# Patient Record
Sex: FEMALE | Race: WHITE | NOT HISPANIC OR LATINO | URBAN - METROPOLITAN AREA
[De-identification: names, ages, dates, MRNs, and addresses within clinical notes are randomized per-mention and may not be internally consistent; named-entity substitution may affect disease eponyms.]

---

## 2017-11-28 ENCOUNTER — OUTPATIENT (OUTPATIENT)
Dept: OUTPATIENT SERVICES | Facility: HOSPITAL | Age: 82
LOS: 1 days | Discharge: HOME | End: 2017-11-28

## 2017-11-28 DIAGNOSIS — R20.0 ANESTHESIA OF SKIN: ICD-10-CM

## 2017-11-30 DIAGNOSIS — H26.9 UNSPECIFIED CATARACT: ICD-10-CM

## 2017-11-30 DIAGNOSIS — E11.9 TYPE 2 DIABETES MELLITUS WITHOUT COMPLICATIONS: ICD-10-CM

## 2019-09-19 ENCOUNTER — INPATIENT (INPATIENT)
Facility: HOSPITAL | Age: 84
LOS: 4 days | Discharge: NURSING, REHAB OR LONG-TERM CARE | End: 2019-09-24
Attending: INTERNAL MEDICINE | Admitting: INTERNAL MEDICINE
Payer: MEDICARE

## 2019-09-19 VITALS
HEART RATE: 82 BPM | SYSTOLIC BLOOD PRESSURE: 153 MMHG | TEMPERATURE: 98 F | WEIGHT: 205.03 LBS | DIASTOLIC BLOOD PRESSURE: 70 MMHG | OXYGEN SATURATION: 96 % | RESPIRATION RATE: 18 BRPM

## 2019-09-19 LAB
ALBUMIN SERPL ELPH-MCNC: 4.1 G/DL — SIGNIFICANT CHANGE UP (ref 3.5–5.2)
ALP SERPL-CCNC: 126 U/L — HIGH (ref 30–115)
ALT FLD-CCNC: 13 U/L — SIGNIFICANT CHANGE UP (ref 0–41)
ANION GAP SERPL CALC-SCNC: 11 MMOL/L — SIGNIFICANT CHANGE UP (ref 7–14)
AST SERPL-CCNC: 24 U/L — SIGNIFICANT CHANGE UP (ref 0–41)
BASOPHILS # BLD AUTO: 0.03 K/UL — SIGNIFICANT CHANGE UP (ref 0–0.2)
BASOPHILS NFR BLD AUTO: 0.3 % — SIGNIFICANT CHANGE UP (ref 0–1)
BILIRUB SERPL-MCNC: 0.5 MG/DL — SIGNIFICANT CHANGE UP (ref 0.2–1.2)
BUN SERPL-MCNC: 12 MG/DL — SIGNIFICANT CHANGE UP (ref 10–20)
CALCIUM SERPL-MCNC: 10.2 MG/DL — HIGH (ref 8.5–10.1)
CHLORIDE SERPL-SCNC: 104 MMOL/L — SIGNIFICANT CHANGE UP (ref 98–110)
CO2 SERPL-SCNC: 27 MMOL/L — SIGNIFICANT CHANGE UP (ref 17–32)
CREAT SERPL-MCNC: 0.9 MG/DL — SIGNIFICANT CHANGE UP (ref 0.7–1.5)
EOSINOPHIL # BLD AUTO: 0.01 K/UL — SIGNIFICANT CHANGE UP (ref 0–0.7)
EOSINOPHIL NFR BLD AUTO: 0.1 % — SIGNIFICANT CHANGE UP (ref 0–8)
GLUCOSE SERPL-MCNC: 95 MG/DL — SIGNIFICANT CHANGE UP (ref 70–99)
HCT VFR BLD CALC: 36.3 % — LOW (ref 37–47)
HGB BLD-MCNC: 11.8 G/DL — LOW (ref 12–16)
IMM GRANULOCYTES NFR BLD AUTO: 0.3 % — SIGNIFICANT CHANGE UP (ref 0.1–0.3)
LYMPHOCYTES # BLD AUTO: 0.45 K/UL — LOW (ref 1.2–3.4)
LYMPHOCYTES # BLD AUTO: 4.5 % — LOW (ref 20.5–51.1)
MCHC RBC-ENTMCNC: 29.1 PG — SIGNIFICANT CHANGE UP (ref 27–31)
MCHC RBC-ENTMCNC: 32.5 G/DL — SIGNIFICANT CHANGE UP (ref 32–37)
MCV RBC AUTO: 89.4 FL — SIGNIFICANT CHANGE UP (ref 81–99)
MONOCYTES # BLD AUTO: 0.68 K/UL — HIGH (ref 0.1–0.6)
MONOCYTES NFR BLD AUTO: 6.8 % — SIGNIFICANT CHANGE UP (ref 1.7–9.3)
NEUTROPHILS # BLD AUTO: 8.82 K/UL — HIGH (ref 1.4–6.5)
NEUTROPHILS NFR BLD AUTO: 88 % — HIGH (ref 42.2–75.2)
NRBC # BLD: 0 /100 WBCS — SIGNIFICANT CHANGE UP (ref 0–0)
PLATELET # BLD AUTO: 227 K/UL — SIGNIFICANT CHANGE UP (ref 130–400)
POTASSIUM SERPL-MCNC: 4.1 MMOL/L — SIGNIFICANT CHANGE UP (ref 3.5–5)
POTASSIUM SERPL-SCNC: 4.1 MMOL/L — SIGNIFICANT CHANGE UP (ref 3.5–5)
PROT SERPL-MCNC: 7.2 G/DL — SIGNIFICANT CHANGE UP (ref 6–8)
RBC # BLD: 4.06 M/UL — LOW (ref 4.2–5.4)
RBC # FLD: 14.1 % — SIGNIFICANT CHANGE UP (ref 11.5–14.5)
SODIUM SERPL-SCNC: 142 MMOL/L — SIGNIFICANT CHANGE UP (ref 135–146)
WBC # BLD: 10.02 K/UL — SIGNIFICANT CHANGE UP (ref 4.8–10.8)
WBC # FLD AUTO: 10.02 K/UL — SIGNIFICANT CHANGE UP (ref 4.8–10.8)

## 2019-09-19 PROCEDURE — 72170 X-RAY EXAM OF PELVIS: CPT | Mod: 26

## 2019-09-19 PROCEDURE — 74177 CT ABD & PELVIS W/CONTRAST: CPT | Mod: 26

## 2019-09-19 PROCEDURE — 70450 CT HEAD/BRAIN W/O DYE: CPT | Mod: 26

## 2019-09-19 PROCEDURE — 73562 X-RAY EXAM OF KNEE 3: CPT | Mod: 26,50

## 2019-09-19 PROCEDURE — 93010 ELECTROCARDIOGRAM REPORT: CPT

## 2019-09-19 PROCEDURE — 71260 CT THORAX DX C+: CPT | Mod: 26

## 2019-09-19 PROCEDURE — 72125 CT NECK SPINE W/O DYE: CPT | Mod: 26

## 2019-09-19 PROCEDURE — 71045 X-RAY EXAM CHEST 1 VIEW: CPT | Mod: 26

## 2019-09-19 PROCEDURE — 99285 EMERGENCY DEPT VISIT HI MDM: CPT | Mod: 25

## 2019-09-19 RX ORDER — TETANUS TOXOID, REDUCED DIPHTHERIA TOXOID AND ACELLULAR PERTUSSIS VACCINE, ADSORBED 5; 2.5; 8; 8; 2.5 [IU]/.5ML; [IU]/.5ML; UG/.5ML; UG/.5ML; UG/.5ML
0.5 SUSPENSION INTRAMUSCULAR ONCE
Refills: 0 | Status: COMPLETED | OUTPATIENT
Start: 2019-09-19 | End: 2019-09-19

## 2019-09-19 NOTE — ED PROVIDER NOTE - PROGRESS NOTE DETAILS
received signout from Castro Mooney - pt upgraded from Acute care to main Ed; pt seenand examined; unable to walk without assistance - usually walks with walker; will check labs, ct head, neck, chest, abdomen; xray pelvis,cxr; mechanical fall not currently on blood thinners/asa; will continue to follow;

## 2019-09-19 NOTE — ED PROVIDER NOTE - CLINICAL SUMMARY MEDICAL DECISION MAKING FREE TEXT BOX
88 y/o female not on blood thinners presented to ED s/p mechanical trip and fall, falling on her knees. No head trauma or LOC. C/o weakness throughout body, unable to ambulate. Labs, panscan obtained and reviewed with pt. No acute traumatic pathology. Pt is 2MN, will place in obs for rehab and physical therapy eval. Pt stable upon disposition.

## 2019-09-19 NOTE — ED ADULT TRIAGE NOTE - CHIEF COMPLAINT QUOTE
pt states she was walking into her home using her walker and a heavy package caused her walker to tilt forward and pt fell to her knees onto a carpet   pt denies LOC/anticoagulation, only c.o. b/l knee pain

## 2019-09-19 NOTE — ED PROVIDER NOTE - OBJECTIVE STATEMENT
87 year old F with hx of hypothyroidism, DM c/o neck/back/knee pain after fall. Pt not on AC.  Pt was walking with walker when walker tipped over and fell onto knees. Pt denies any head trauma/loc. Sts she was on the floor for > 1 hr before she was able to call for help. Pt normally ambulates with walking but has not been able to ambulate since fall. No dizziness, headache, visual changes, abdominal pain, nausea, vomiting, chest pain, sob.

## 2019-09-19 NOTE — ED PROVIDER NOTE - PHYSICAL EXAMINATION
CONSTITUTIONAL: Well-appearing; well-nourished; in no apparent distress.   EYES: PERRL; EOM intact, no nystagmus  ENT: normal nose; no rhinorrhea; normal pharynx with no tonsillar hypertrophy.   NECK: No midline c spine ttp. + left sided paraspinal c spine ttp  CARDIOVASCULAR: Normal S1, S2; no murmurs, rubs, or gallops.   RESPIRATORY: Normal chest excursion with respiration; breath sounds clear and equal bilaterally; no wheezes, rhonchi, or rales.  GI/: Normal bowel sounds; non-distended; non-tender; no palpable organomegaly.   MS: No midline spinal ttp. Stable pelvis. Full rom of all extrem. Strength equal b/l 5/5 throughout. + unsteady gait. + ttp to b/l anterior knee  SKIN: Normal for age and race; + abrasion noted to b/l anterior knee  NEURO/PSYCH: A & O x 4; grossly unremarkable. mood and manner are appropriate. No focal deficits. No facial droop. No tongue deviation. Cerebellar intact. Sensation intact

## 2019-09-19 NOTE — ED PROVIDER NOTE - NS ED ROS FT
Constitutional: no fever, chills, no recent weight loss, change in appetite or malaise  Eyes: no redness/discharge/pain/vision changes  Cardiac: No chest pain, SOB or edema.  Respiratory: No cough or respiratory distress  GI: No nausea, vomiting, diarrhea or abdominal pain.  : No dysuria, frequency, urgency or hematuria  MS: + neck/back/ b/l knee pain. no loss of ROM, no weakness  Neuro: No headache or weakness. No LOC.  Skin: + abrasions over b/l knee  Except as documented in the HPI, all other systems are negative.

## 2019-09-19 NOTE — ED PROVIDER NOTE - ATTENDING CONTRIBUTION TO CARE
I personally evaluated the patient. I reviewed the Resident’s or Physician Assistant’s note (as assigned above), and agree with the findings and plan except as documented in my note.    Pt is an 88 y/o female not on anticoagulation who presents to ED for weakness after a fall. Pt states she tripped while walking with her walker, fell on bilateral knees. No head trauma, no LOC. Now c/o diffuse body pain. No headache, chest pain, sOB, abd pain. Sx's are moderate, constant, worse with palpation.    Constitutional: Well developed, well nourished. NAD  Head: Normocephalic, atraumatic.  Eyes: PERRL. EOMI. No Raccoon eyes.   ENT: No nasal discharge. No septal hematoma. No Lugo sign. Mucous membranes moist.  Neck: Supple. Painless ROM. No midline tenderness, stepoffs.  Cardiovascular: Normal S1, S2. Regular rate and rhythm. No murmurs, rubs, or gallops.  Pulmonary: Normal respiratory rate and effort. Lungs clear to auscultation bilaterally. No wheezing, rales, or rhonchi.  CHEST: No chest wall tenderness, crepitus.  Abdominal: Soft. Nondistended. Nontender. No rebound, guarding, rigidity.  BACK: No midline T/L/S tenderness, stepoffs. No saddle paresthesia.  Extremities. Pelvis stable. No traumatic deformities, tenderness of extremities.  Skin: No rashes, cyanosis, lacerations, abrasions.  Neuro: AAOx3. Strength 5/5 in all extremities. Sensation intact throughout. No focal neurological deficits.  Psych: Normal mood. Normal affect.

## 2019-09-19 NOTE — ED ADULT NURSE NOTE - OBJECTIVE STATEMENT
glf onto bilateral knees when tipped over when holding a heavy package and walking with her walker. usually walks with a walker, now needs assistance walking. pain to bilateral knees. no loc, no anti coagulation glf onto bilateral knees when tipped over when holding a heavy package and walking with her walker. usually walks with a walker, now needs assistance walking, states " I don't think I can walk" . pain to bilateral knees, neck and back pain. no loc, no anti coagulation

## 2019-09-20 DIAGNOSIS — Z90.710 ACQUIRED ABSENCE OF BOTH CERVIX AND UTERUS: Chronic | ICD-10-CM

## 2019-09-20 DIAGNOSIS — Z90.49 ACQUIRED ABSENCE OF OTHER SPECIFIED PARTS OF DIGESTIVE TRACT: Chronic | ICD-10-CM

## 2019-09-20 DIAGNOSIS — Z98.890 OTHER SPECIFIED POSTPROCEDURAL STATES: Chronic | ICD-10-CM

## 2019-09-20 LAB
GLUCOSE BLDC GLUCOMTR-MCNC: 127 MG/DL — HIGH (ref 70–99)
GLUCOSE BLDC GLUCOMTR-MCNC: 172 MG/DL — HIGH (ref 70–99)

## 2019-09-20 PROCEDURE — 99223 1ST HOSP IP/OBS HIGH 75: CPT | Mod: AI

## 2019-09-20 PROCEDURE — 99234 HOSP IP/OBS SM DT SF/LOW 45: CPT

## 2019-09-20 RX ORDER — ACETAMINOPHEN 500 MG
650 TABLET ORAL EVERY 6 HOURS
Refills: 0 | Status: DISCONTINUED | OUTPATIENT
Start: 2019-09-20 | End: 2019-09-24

## 2019-09-20 RX ORDER — ACETAMINOPHEN 500 MG
650 TABLET ORAL ONCE
Refills: 0 | Status: COMPLETED | OUTPATIENT
Start: 2019-09-20 | End: 2019-09-20

## 2019-09-20 RX ORDER — CHLORHEXIDINE GLUCONATE 213 G/1000ML
1 SOLUTION TOPICAL
Refills: 0 | Status: DISCONTINUED | OUTPATIENT
Start: 2019-09-20 | End: 2019-09-24

## 2019-09-20 RX ORDER — ENOXAPARIN SODIUM 100 MG/ML
40 INJECTION SUBCUTANEOUS DAILY
Refills: 0 | Status: DISCONTINUED | OUTPATIENT
Start: 2019-09-20 | End: 2019-09-24

## 2019-09-20 RX ORDER — IBUPROFEN 200 MG
400 TABLET ORAL ONCE
Refills: 0 | Status: COMPLETED | OUTPATIENT
Start: 2019-09-20 | End: 2019-09-20

## 2019-09-20 RX ORDER — GABAPENTIN 400 MG/1
300 CAPSULE ORAL THREE TIMES A DAY
Refills: 0 | Status: DISCONTINUED | OUTPATIENT
Start: 2019-09-20 | End: 2019-09-24

## 2019-09-20 RX ORDER — LEVOTHYROXINE SODIUM 125 MCG
150 TABLET ORAL DAILY
Refills: 0 | Status: DISCONTINUED | OUTPATIENT
Start: 2019-09-20 | End: 2019-09-24

## 2019-09-20 RX ORDER — INFLUENZA VIRUS VACCINE 15; 15; 15; 15 UG/.5ML; UG/.5ML; UG/.5ML; UG/.5ML
0.5 SUSPENSION INTRAMUSCULAR ONCE
Refills: 0 | Status: COMPLETED | OUTPATIENT
Start: 2019-09-20 | End: 2019-09-20

## 2019-09-20 RX ADMIN — Medication 400 MILLIGRAM(S): at 14:59

## 2019-09-20 RX ADMIN — GABAPENTIN 300 MILLIGRAM(S): 400 CAPSULE ORAL at 22:10

## 2019-09-20 RX ADMIN — Medication 650 MILLIGRAM(S): at 05:50

## 2019-09-20 NOTE — ED CDU PROVIDER INITIAL DAY NOTE - MEDICAL DECISION MAKING DETAILS
86yo woman h/o hypothyroid, DM, chronic back pain was placed in CDU for rehab evaluation after a mechanical fall PTA which, though ED workup was unremarkable, has left her unable to walk well enough for d/c home, as she lives alone. VS, exam as noted, pt alert and pleasantly cooperative. Plan is for rehab consult and possible 4A admisson vs medical admission for SNF rehab placement.

## 2019-09-20 NOTE — CONSULT NOTE ADULT - SUBJECTIVE AND OBJECTIVE BOX
Patient is a 87y old  Female who presents with a chief complaint of Fall  HPI:      PAST MEDICAL & SURGICAL HISTORY: Hx hypothyroidism      Hospital Course: Trauma [facility] neg - CO pain both knees/ Low back    TODAY'S SUBJECTIVE & REVIEW OF SYMPTOMS:     Constitutional Weakness   Cardio WNL   Resp WNL   GI WNL  Heme WNL  Endo WNL  Skin WNL  MSK LBP  Neuro WNL  Cognitive WNL  Psych WNL      MEDICATIONS  (STANDING):  diphtheria/tetanus/pertussis (acellular) Vaccine (ADAcel) 0.5 milliLiter(s) IntraMuscular once    MEDICATIONS  (PRN):      FAMILY HISTORY:      Allergies    No Known Allergies    Intolerances        SOCIAL HISTORY:    [    ] Etoh  [    ] Smoking  [    ] Substance abuse     Home Environment:  [   x ] Home Alone  [    ] Lives with Family  [    ] Home Health Aid    Dwelling:  [   x ] Apartment  [    ] Private House  [    ] Adult Home  [    ] Skilled Nursing Facility      [    ] Short Term  [    ] Long Term  [  x  ] Stairs 2 OUTSIDE                          [    ] Elevator     FUNCTIONAL STATUS PTA: (Check all that apply)  Ambulation: [   x  ]Independent    [    ] Dependent     [    ] Non-Ambulatory  Assistive Device: [    ] SA Cane  [    ]  Q Cane  [   x ] Walker  [    ]  Wheelchair  ADL : [  x  ] Independent  [    ]  Dependent       Vital Signs Last 24 Hrs  T(C): 35.9 (20 Sep 2019 07:37), Max: 36.6 (19 Sep 2019 19:22)  T(F): 96.7 (20 Sep 2019 07:37), Max: 97.9 (19 Sep 2019 19:22)  HR: 73 (20 Sep 2019 07:37) (73 - 82)  BP: 172/79 (20 Sep 2019 07:37) (151/68 - 172/79)  BP(mean): --  RR: 18 (20 Sep 2019 07:37) (17 - 18)  SpO2: 97% (20 Sep 2019 07:37) (96% - 97%)      PHYSICAL EXAM: Alert & Oriented X3  GENERAL: NAD, well-groomed, well-developed  HEAD:  Atraumatic, Normocephalic  EYES: EOMI, PERRLA, conjunctiva and sclera clear  NECK: Supple  CHEST/LUNG: Clear bilaterally  HEART: Regular rate and rhythm  ABDOMEN: Soft, Nontender, Nondistended; Bowel sounds present  EXTREMITIES:  no calf tenderness,no edema BLES Fabien knees with abrasions/ OA Changes    NERVOUS SYSTEM:  Cranial Nerves 2-12 intact [  x  ] Abnormal  [    ]  ROM: WFL all extremities [  x  ]  Abnormal [     ]  Motor Strength: WFL all extremities  [  x  ]  Abnormal [    ]4/5  Sensation: intact to light touch [ x   ] Abnormal [    ]      FUNCTIONAL STATUS:  Bed Mobility: [   ]  Independent [    ]  Supervision [   x ]  Needs Assistance [  ]  N/A  Transfers: [    ]  Independent [    ]  Supervision [ x   ]  Needs Assistance [    ]  N/A    Ambulation:  [    ]  Independent [    ]  Supervision [  x  ]  Needs Assistance [    ]  N/A   ADL:  [    ]   Independent [ x   ] Requires Assistance [    ] N/A       LABS:                        11.8   10.02 )-----------( 227      ( 19 Sep 2019 20:25 )             36.3     09-19    142  |  104  |  12  ----------------------------<  95  4.1   |  27  |  0.9    Ca    10.2<H>      19 Sep 2019 20:25    TPro  7.2  /  Alb  4.1  /  TBili  0.5  /  DBili  x   /  AST  24  /  ALT  13  /  AlkPhos  126<H>  09-19          RADIOLOGY & ADDITIONAL STUDIES:

## 2019-09-20 NOTE — H&P ADULT - HISTORY OF PRESENT ILLNESS
88 yo F with PMH of hypothyroidism, DM2 presented to ED after mechanical fall. Patient was walking with walker when walker tipped over and patient fell onto knees. Was complaining of back and knee pain after fall. Reports she was on floor for >1 hour before she was able to call for help. Was unable to ambulate after fall. Denies any dizziness, chest pain, palpitations, headaches, SOB, N/V/C/D, or other complaint or symptom.    Patient had pan-CT that was negative in ED. Was initially admitted to OBS. Was seen by Physiatry who recommended SNF. Admitted for placement. 88 yo F with PMH of hypothyroidism, DM2, neuropathy, and OA presented to ED after mechanical fall. Patient was walking with walker in apartment when walker tipped over and patient fell onto knees. Was complaining of back and knee pain after fall. Was unable to get up after fall. Reports she was on floor for >1 hour before niece came to apartment. Denies any dizziness, chest pain, palpitations, headaches, SOB, N/V/C/D, or other complaint or symptoms.    Patient had pan-CT that was negative in ED. Was initially admitted to OBS. Was seen by Physiatry who recommended SNF. Admitted for placement.

## 2019-09-20 NOTE — H&P ADULT - ATTENDING COMMENTS
the patient was seen and examined at beside.  Resting in chair, no distress, but c/o  " pain all over ".    Agree with above a/p by resident.   Control pain.  F/U with Rehab/PT.

## 2019-09-20 NOTE — ED CDU PROVIDER INITIAL DAY NOTE - PROGRESS NOTE DETAILS
Pt seen bedside, NAD no complaints at the moment, pt would like short term rehad or if possible would prefer home health aid. will discuss with  and have rehab see and evaluate pt.

## 2019-09-20 NOTE — ED ADULT NURSE REASSESSMENT NOTE - NS ED NURSE REASSESS COMMENT FT1
Pt aox3, in no acute distress, IV intact, vs wnl, denies pain, only c/o mild pain when turning from side to side, skin intact, will continue to monitor.
pt remain in observation, vss as per flowsheet, a&0x4, no acute issues noted at this time.

## 2019-09-20 NOTE — H&P ADULT - ASSESSMENT
#) Mechanical fall  - trauma workup negative  - PMR following - Rx SNF    #) Hypothyroidism - c/w Synthroid    #) DM2 - holding oral agents, monitor fs, insulin basal+bolus PRN    DVT ppx: Lovenox subQ  Diet: carb consistent  Activity: ambulate with assistance  Code status: FULL  Dispo: from home - needs SNF 88 yo F with PMH of hypothyroidism, DM2, neuropathy, and OA presented to ED after mechanical fall.    #) Mechanical fall  - trauma workup negative  - PMR following - Rx SNF    #) Hypothyroidism - c/w Synthroid    #) DM2 - holding oral agents, monitor fs, insulin basal+bolus PRN    #) Neuropathy - c/w Gabapentin    DVT ppx: Lovenox subQ  Diet: carb consistent  Activity: ambulate with assistance  Code status: FULL  Dispo: from home - needs SNF

## 2019-09-20 NOTE — H&P ADULT - NSICDXPASTMEDICALHX_GEN_ALL_CORE_FT
PAST MEDICAL HISTORY:  DM (diabetes mellitus)     Hypothyroidism PAST MEDICAL HISTORY:  DM (diabetes mellitus)     Hypothyroidism Enter shared details here, e.g., 'Noted on CXR 1/1/16.'    Osteoarthritis PAST MEDICAL HISTORY:  DM (diabetes mellitus)     Hypothyroidism     Osteoarthritis

## 2019-09-20 NOTE — ED CDU PROVIDER DISPOSITION NOTE - ATTENDING CONTRIBUTION TO CARE
88yo woman h/o hypothyroid, DM, chronic back pain was placed in CDU for rehab evaluation after a mechanical fall PTA which, though ED workup was unremarkable, has left her unable to walk well enough for d/c home, as she lives alone. VS, exam as noted, pt alert and pleasantly cooperative. Pt evaluated by rehab, not a candidate for 4A. She will be admitted for SNF placement. Pt amenable to admission.

## 2019-09-20 NOTE — H&P ADULT - NSHPLABSRESULTS_GEN_ALL_CORE
11.8   10.02 )-----------( 227      ( 19 Sep 2019 20:25 )             36.3     09-19    142  |  104  |  12  ----------------------------<  95  4.1   |  27  |  0.9    Ca    10.2<H>      19 Sep 2019 20:25    TPro  7.2  /  Alb  4.1  /  TBili  0.5  /  DBili  x   /  AST  24  /  ALT  13  /  AlkPhos  126<H>  09-19        Lactate Trend    CAPILLARY BLOOD GLUCOSE    POCT Blood Glucose.: 144 mg/dL (20 Sep 2019 11:35)

## 2019-09-20 NOTE — CONSULT NOTE ADULT - ASSESSMENT
IMPRESSION: Rehab of gait ab fall    PRECAUTIONS: [    ] Cardiac  [    ] Respiratory  [    ] Seizures [    ] Contact Isolation  [    ] Droplet Isolation  [    ] Other    Weight Bearing Status:     RECOMMENDATION:    Out of Bed to Chair     DVT/Decubiti Prophylaxis    REHAB PLAN:     [  x   ] Bedside P/T 3-5 times a week   [     ] Bedside O/T  2-3 times a week   [     ] No Rehab Therapy Indicated   [     ]  Speech Therapy   Conditioning/ROM                                 ADL  Bed Mobility                                            Conditioning/ROM  Transfers                                                  Bed Mobility  Sitting /Standing Balance                      Transfers                                        Gait Training                                            Sitting/Standing Balance  Stair Training [   ]Applicable                 Home equipment Eval                                                                     Splinting  [   ] Only      GOALS:   ADL   [ x   ]   Independent         Transfers  [   x ] Independent            Ambulation  [  x   ] Independent     [    x ] With device                            [    ]  CG                                               [    ]  CG                                                    [     ] CG                            [    ] Min A                                          [    ] Min A                                                [     ] Min  A          DISCHARGE PLAN:   [     ]  Good candidate for Intensive Rehabilitation/Hospital based                                             Will tolerate 3hrs Intensive Rehab Daily                                       [  x    ]  Short Term Rehab in Skilled Nursing Facility WAS AT [facility] IN PAST                                       [      ]  Home with Outpatient or VN services                                         [      ]  Possible Candidate for Intensive Hospital based Rehab

## 2019-09-21 LAB
ALBUMIN SERPL ELPH-MCNC: 3.8 G/DL — SIGNIFICANT CHANGE UP (ref 3.5–5.2)
ALP SERPL-CCNC: 123 U/L — HIGH (ref 30–115)
ALT FLD-CCNC: 13 U/L — SIGNIFICANT CHANGE UP (ref 0–41)
ANION GAP SERPL CALC-SCNC: 11 MMOL/L — SIGNIFICANT CHANGE UP (ref 7–14)
APTT BLD: 35.2 SEC — SIGNIFICANT CHANGE UP (ref 27–39.2)
AST SERPL-CCNC: 32 U/L — SIGNIFICANT CHANGE UP (ref 0–41)
BASOPHILS # BLD AUTO: 0.03 K/UL — SIGNIFICANT CHANGE UP (ref 0–0.2)
BASOPHILS NFR BLD AUTO: 0.6 % — SIGNIFICANT CHANGE UP (ref 0–1)
BILIRUB SERPL-MCNC: 0.5 MG/DL — SIGNIFICANT CHANGE UP (ref 0.2–1.2)
BUN SERPL-MCNC: 8 MG/DL — LOW (ref 10–20)
CALCIUM SERPL-MCNC: 10 MG/DL — SIGNIFICANT CHANGE UP (ref 8.5–10.1)
CHLORIDE SERPL-SCNC: 101 MMOL/L — SIGNIFICANT CHANGE UP (ref 98–110)
CK SERPL-CCNC: 186 U/L — SIGNIFICANT CHANGE UP (ref 0–225)
CO2 SERPL-SCNC: 28 MMOL/L — SIGNIFICANT CHANGE UP (ref 17–32)
CREAT SERPL-MCNC: 0.8 MG/DL — SIGNIFICANT CHANGE UP (ref 0.7–1.5)
EOSINOPHIL # BLD AUTO: 0.08 K/UL — SIGNIFICANT CHANGE UP (ref 0–0.7)
EOSINOPHIL NFR BLD AUTO: 1.6 % — SIGNIFICANT CHANGE UP (ref 0–8)
GLUCOSE BLDC GLUCOMTR-MCNC: 121 MG/DL — HIGH (ref 70–99)
GLUCOSE BLDC GLUCOMTR-MCNC: 128 MG/DL — HIGH (ref 70–99)
GLUCOSE BLDC GLUCOMTR-MCNC: 131 MG/DL — HIGH (ref 70–99)
GLUCOSE BLDC GLUCOMTR-MCNC: 185 MG/DL — HIGH (ref 70–99)
GLUCOSE SERPL-MCNC: 117 MG/DL — HIGH (ref 70–99)
HCT VFR BLD CALC: 38.2 % — SIGNIFICANT CHANGE UP (ref 37–47)
HGB BLD-MCNC: 12.5 G/DL — SIGNIFICANT CHANGE UP (ref 12–16)
IMM GRANULOCYTES NFR BLD AUTO: 0.2 % — SIGNIFICANT CHANGE UP (ref 0.1–0.3)
INR BLD: 0.96 RATIO — SIGNIFICANT CHANGE UP (ref 0.65–1.3)
LYMPHOCYTES # BLD AUTO: 0.58 K/UL — LOW (ref 1.2–3.4)
LYMPHOCYTES # BLD AUTO: 11.6 % — LOW (ref 20.5–51.1)
MAGNESIUM SERPL-MCNC: 1.9 MG/DL — SIGNIFICANT CHANGE UP (ref 1.8–2.4)
MCHC RBC-ENTMCNC: 29.3 PG — SIGNIFICANT CHANGE UP (ref 27–31)
MCHC RBC-ENTMCNC: 32.7 G/DL — SIGNIFICANT CHANGE UP (ref 32–37)
MCV RBC AUTO: 89.7 FL — SIGNIFICANT CHANGE UP (ref 81–99)
MONOCYTES # BLD AUTO: 0.49 K/UL — SIGNIFICANT CHANGE UP (ref 0.1–0.6)
MONOCYTES NFR BLD AUTO: 9.8 % — HIGH (ref 1.7–9.3)
NEUTROPHILS # BLD AUTO: 3.79 K/UL — SIGNIFICANT CHANGE UP (ref 1.4–6.5)
NEUTROPHILS NFR BLD AUTO: 76.2 % — HIGH (ref 42.2–75.2)
NRBC # BLD: 0 /100 WBCS — SIGNIFICANT CHANGE UP (ref 0–0)
PHOSPHATE SERPL-MCNC: 2.8 MG/DL — SIGNIFICANT CHANGE UP (ref 2.1–4.9)
PLATELET # BLD AUTO: 204 K/UL — SIGNIFICANT CHANGE UP (ref 130–400)
POTASSIUM SERPL-MCNC: 4.1 MMOL/L — SIGNIFICANT CHANGE UP (ref 3.5–5)
POTASSIUM SERPL-SCNC: 4.1 MMOL/L — SIGNIFICANT CHANGE UP (ref 3.5–5)
PROT SERPL-MCNC: 6.5 G/DL — SIGNIFICANT CHANGE UP (ref 6–8)
PROTHROM AB SERPL-ACNC: 11.1 SEC — SIGNIFICANT CHANGE UP (ref 9.95–12.87)
RBC # BLD: 4.26 M/UL — SIGNIFICANT CHANGE UP (ref 4.2–5.4)
RBC # FLD: 14.4 % — SIGNIFICANT CHANGE UP (ref 11.5–14.5)
SODIUM SERPL-SCNC: 140 MMOL/L — SIGNIFICANT CHANGE UP (ref 135–146)
TSH SERPL-MCNC: 0.2 UIU/ML — LOW (ref 0.27–4.2)
WBC # BLD: 4.98 K/UL — SIGNIFICANT CHANGE UP (ref 4.8–10.8)
WBC # FLD AUTO: 4.98 K/UL — SIGNIFICANT CHANGE UP (ref 4.8–10.8)

## 2019-09-21 RX ORDER — DEXTROSE 50 % IN WATER 50 %
12.5 SYRINGE (ML) INTRAVENOUS ONCE
Refills: 0 | Status: DISCONTINUED | OUTPATIENT
Start: 2019-09-21 | End: 2019-09-24

## 2019-09-21 RX ORDER — DEXTROSE 50 % IN WATER 50 %
25 SYRINGE (ML) INTRAVENOUS ONCE
Refills: 0 | Status: DISCONTINUED | OUTPATIENT
Start: 2019-09-21 | End: 2019-09-24

## 2019-09-21 RX ORDER — SODIUM CHLORIDE 9 MG/ML
1000 INJECTION, SOLUTION INTRAVENOUS
Refills: 0 | Status: DISCONTINUED | OUTPATIENT
Start: 2019-09-21 | End: 2019-09-24

## 2019-09-21 RX ORDER — GLUCAGON INJECTION, SOLUTION 0.5 MG/.1ML
1 INJECTION, SOLUTION SUBCUTANEOUS ONCE
Refills: 0 | Status: DISCONTINUED | OUTPATIENT
Start: 2019-09-21 | End: 2019-09-24

## 2019-09-21 RX ORDER — HYDRALAZINE HCL 50 MG
50 TABLET ORAL ONCE
Refills: 0 | Status: COMPLETED | OUTPATIENT
Start: 2019-09-21 | End: 2019-09-21

## 2019-09-21 RX ORDER — DEXTROSE 50 % IN WATER 50 %
15 SYRINGE (ML) INTRAVENOUS ONCE
Refills: 0 | Status: DISCONTINUED | OUTPATIENT
Start: 2019-09-21 | End: 2019-09-24

## 2019-09-21 RX ADMIN — GABAPENTIN 300 MILLIGRAM(S): 400 CAPSULE ORAL at 13:07

## 2019-09-21 RX ADMIN — ENOXAPARIN SODIUM 40 MILLIGRAM(S): 100 INJECTION SUBCUTANEOUS at 11:40

## 2019-09-21 RX ADMIN — CHLORHEXIDINE GLUCONATE 1 APPLICATION(S): 213 SOLUTION TOPICAL at 05:38

## 2019-09-21 RX ADMIN — Medication 650 MILLIGRAM(S): at 01:18

## 2019-09-21 RX ADMIN — GABAPENTIN 300 MILLIGRAM(S): 400 CAPSULE ORAL at 21:04

## 2019-09-21 RX ADMIN — Medication 650 MILLIGRAM(S): at 01:48

## 2019-09-21 RX ADMIN — Medication 50 MILLIGRAM(S): at 05:38

## 2019-09-21 RX ADMIN — GABAPENTIN 300 MILLIGRAM(S): 400 CAPSULE ORAL at 05:38

## 2019-09-21 RX ADMIN — Medication 150 MICROGRAM(S): at 05:38

## 2019-09-22 LAB
GLUCOSE BLDC GLUCOMTR-MCNC: 109 MG/DL — HIGH (ref 70–99)
GLUCOSE BLDC GLUCOMTR-MCNC: 117 MG/DL — HIGH (ref 70–99)
GLUCOSE BLDC GLUCOMTR-MCNC: 128 MG/DL — HIGH (ref 70–99)
GLUCOSE BLDC GLUCOMTR-MCNC: 129 MG/DL — HIGH (ref 70–99)

## 2019-09-22 PROCEDURE — 99232 SBSQ HOSP IP/OBS MODERATE 35: CPT

## 2019-09-22 RX ADMIN — CHLORHEXIDINE GLUCONATE 1 APPLICATION(S): 213 SOLUTION TOPICAL at 05:08

## 2019-09-22 RX ADMIN — Medication 150 MICROGRAM(S): at 05:08

## 2019-09-22 RX ADMIN — GABAPENTIN 300 MILLIGRAM(S): 400 CAPSULE ORAL at 21:07

## 2019-09-22 RX ADMIN — ENOXAPARIN SODIUM 40 MILLIGRAM(S): 100 INJECTION SUBCUTANEOUS at 11:52

## 2019-09-22 RX ADMIN — GABAPENTIN 300 MILLIGRAM(S): 400 CAPSULE ORAL at 05:08

## 2019-09-22 RX ADMIN — GABAPENTIN 300 MILLIGRAM(S): 400 CAPSULE ORAL at 14:53

## 2019-09-23 LAB
GLUCOSE BLDC GLUCOMTR-MCNC: 102 MG/DL — HIGH (ref 70–99)
GLUCOSE BLDC GLUCOMTR-MCNC: 123 MG/DL — HIGH (ref 70–99)
GLUCOSE BLDC GLUCOMTR-MCNC: 123 MG/DL — HIGH (ref 70–99)
GLUCOSE BLDC GLUCOMTR-MCNC: 183 MG/DL — HIGH (ref 70–99)

## 2019-09-23 PROCEDURE — 99232 SBSQ HOSP IP/OBS MODERATE 35: CPT

## 2019-09-23 RX ADMIN — GABAPENTIN 300 MILLIGRAM(S): 400 CAPSULE ORAL at 21:28

## 2019-09-23 RX ADMIN — GABAPENTIN 300 MILLIGRAM(S): 400 CAPSULE ORAL at 05:06

## 2019-09-23 RX ADMIN — ENOXAPARIN SODIUM 40 MILLIGRAM(S): 100 INJECTION SUBCUTANEOUS at 11:10

## 2019-09-23 RX ADMIN — CHLORHEXIDINE GLUCONATE 1 APPLICATION(S): 213 SOLUTION TOPICAL at 05:05

## 2019-09-23 RX ADMIN — Medication 30 MILLILITER(S): at 15:12

## 2019-09-23 RX ADMIN — Medication 1 APPLICATION(S): at 17:44

## 2019-09-23 RX ADMIN — GABAPENTIN 300 MILLIGRAM(S): 400 CAPSULE ORAL at 14:12

## 2019-09-23 RX ADMIN — Medication 150 MICROGRAM(S): at 05:06

## 2019-09-24 VITALS
RESPIRATION RATE: 18 BRPM | SYSTOLIC BLOOD PRESSURE: 134 MMHG | DIASTOLIC BLOOD PRESSURE: 63 MMHG | HEART RATE: 70 BPM | TEMPERATURE: 98 F

## 2019-09-24 LAB — GLUCOSE BLDC GLUCOMTR-MCNC: 106 MG/DL — HIGH (ref 70–99)

## 2019-09-24 PROCEDURE — 99232 SBSQ HOSP IP/OBS MODERATE 35: CPT

## 2019-09-24 RX ORDER — ACETAMINOPHEN 500 MG
2 TABLET ORAL
Qty: 0 | Refills: 0 | DISCHARGE
Start: 2019-09-24

## 2019-09-24 RX ORDER — LISINOPRIL 2.5 MG/1
5 TABLET ORAL DAILY
Refills: 0 | Status: DISCONTINUED | OUTPATIENT
Start: 2019-09-24 | End: 2019-09-24

## 2019-09-24 RX ORDER — LISINOPRIL 2.5 MG/1
1 TABLET ORAL
Qty: 0 | Refills: 0 | DISCHARGE
Start: 2019-09-24

## 2019-09-24 RX ORDER — LISINOPRIL 2.5 MG/1
5 TABLET ORAL ONCE
Refills: 0 | Status: COMPLETED | OUTPATIENT
Start: 2019-09-24 | End: 2019-09-24

## 2019-09-24 RX ADMIN — LISINOPRIL 5 MILLIGRAM(S): 2.5 TABLET ORAL at 14:05

## 2019-09-24 RX ADMIN — ENOXAPARIN SODIUM 40 MILLIGRAM(S): 100 INJECTION SUBCUTANEOUS at 11:19

## 2019-09-24 RX ADMIN — GABAPENTIN 300 MILLIGRAM(S): 400 CAPSULE ORAL at 14:05

## 2019-09-24 RX ADMIN — Medication 1 APPLICATION(S): at 05:16

## 2019-09-24 RX ADMIN — GABAPENTIN 300 MILLIGRAM(S): 400 CAPSULE ORAL at 05:19

## 2019-09-24 RX ADMIN — CHLORHEXIDINE GLUCONATE 1 APPLICATION(S): 213 SOLUTION TOPICAL at 05:19

## 2019-09-24 RX ADMIN — Medication 150 MICROGRAM(S): at 05:19

## 2019-09-24 NOTE — DISCHARGE NOTE NURSING/CASE MANAGEMENT/SOCIAL WORK - PATIENT PORTAL LINK FT
You can access the FollowMyHealth Patient Portal offered by [facility] by registering at the following website: [email or web address]. By joining [employer]’s FollowMyHealth portal, you will also be able to view your health information using other applications (apps) compatible with our system.

## 2019-09-24 NOTE — PROGRESS NOTE ADULT - SUBJECTIVE AND OBJECTIVE BOX
MELANIA TRISTAN  87y  Female      Patient is a 87y old  Female who presents with a chief complaint of mechanical fall (23 Sep 2019 14:04)      INTERVAL HPI/OVERNIGHT EVENTS:      ******************************* REVIEW OF SYSTEMS:**********************************************    resting in bed.  All other review of systems negative    *********************** VITALS ******************************************    T(F): 96.6 (09-24-19 @ 06:25)  HR: 71 (09-24-19 @ 10:10) (71 - 75)  BP: 138/62 (09-24-19 @ 10:10) (113/54 - 182/76)  RR: 17 (09-24-19 @ 06:25) (17 - 18)  SpO2: --            ******************************** PHYSICAL EXAM:**************************************************  GENERAL: NAD    PSYCH: no agitation, baseline mentation  HEENT:     NERVOUS SYSTEM:  Alert & Oriented X3,     PULMONARY: MATTEO, CTA    CARDIOVASCULAR: S1S2 RRR    GI: Soft, NT, ND; BS present.    EXTREMITIES:  2+ Peripheral Pulses, No clubbing, cyanosis, or edema    LYMPH: No lymphadenopathy noted    SKIN: No rashes or lesions    ******************************************************************************************      **************************** LABS *******************************************************                  Lactate Trend        CAPILLARY BLOOD GLUCOSE      POCT Blood Glucose.: 106 mg/dL (24 Sep 2019 07:51)          **************************Active Medications *******************************************  No Known Allergies      acetaminophen   Tablet .. 650 milliGRAM(s) Oral every 6 hours PRN  aluminum hydroxide/magnesium hydroxide/simethicone Suspension 30 milliLiter(s) Oral every 4 hours PRN  chlorhexidine 4% Liquid 1 Application(s) Topical <User Schedule>  dextrose 40% Gel 15 Gram(s) Oral once PRN  dextrose 5%. 1000 milliLiter(s) IV Continuous <Continuous>  dextrose 50% Injectable 12.5 Gram(s) IV Push once  dextrose 50% Injectable 25 Gram(s) IV Push once  dextrose 50% Injectable 25 Gram(s) IV Push once  enoxaparin Injectable 40 milliGRAM(s) SubCutaneous daily  gabapentin 300 milliGRAM(s) Oral three times a day  glucagon  Injectable 1 milliGRAM(s) IntraMuscular once PRN  levothyroxine 150 MICROGram(s) Oral daily  lisinopril 5 milliGRAM(s) Oral daily  triamcinolone 0.5% Ointment 1 Application(s) Topical two times a day      ***************************************************  RADIOLOGY & ADDITIONAL TESTS:    Imaging Personally Reviewed:  [ ] YES  [ ] NO    HEALTH ISSUES - PROBLEM Dx:
SUBJECTIVE:    there were no acute events overnight. The patient reports that she is starting to note some bruising from her fall, but otherwise ha snot new complaints.     PAST MEDICAL & SURGICAL HISTORY  Hypothyroidism  Osteoarthritis  DM (diabetes mellitus)  History of lumpectomy  History of cholecystectomy  History of hysterectomy    SOCIAL HISTORY:  Negative for smoking/alcohol/drug use.     ALLERGIES:  No Known Allergies    MEDICATIONS:  STANDING MEDICATIONS  chlorhexidine 4% Liquid 1 Application(s) Topical <User Schedule>  dextrose 5%. 1000 milliLiter(s) IV Continuous <Continuous>  dextrose 50% Injectable 12.5 Gram(s) IV Push once  dextrose 50% Injectable 25 Gram(s) IV Push once  dextrose 50% Injectable 25 Gram(s) IV Push once  enoxaparin Injectable 40 milliGRAM(s) SubCutaneous daily  gabapentin 300 milliGRAM(s) Oral three times a day  influenza   Vaccine 0.5 milliLiter(s) IntraMuscular once  levothyroxine 150 MICROGram(s) Oral daily    PRN MEDICATIONS  acetaminophen   Tablet .. 650 milliGRAM(s) Oral every 6 hours PRN  dextrose 40% Gel 15 Gram(s) Oral once PRN  glucagon  Injectable 1 milliGRAM(s) IntraMuscular once PRN    VITALS:   T(F): 99.4  HR: 72  BP: 113/54  RR: 18  SpO2: --    LABS:                            RADIOLOGY:    PHYSICAL EXAM:  GEN: NAD, awake in bed in hospital gown, chronic rash on her face rosacea versus dermatitis?  LUNGS: Clear to auscultation bilaterally anteriorly, unabel to cooperate with posterior exam due to mobility/positioning  HEART: +s1s2 intact, RRR.   ABD: Soft, NT/ND. (+) bs  EXT: no c/c/e but has some chronic tenderness on RLE anterior aspect over shin. has bilateral prominent capillaries, 2+PP, RUIZ  NEURO: AAOX3 at this time, weak but no new focal deficits.
MELANIA TRISTAN  87y  Female      Patient is a 87y old  Female who presents with a chief complaint of mechanical fall (22 Sep 2019 10:43)      INTERVAL HPI/OVERNIGHT EVENTS:      ******************************* REVIEW OF SYSTEMS:**********************************************  resting in bed.   All other review of systems negative    *********************** VITALS ******************************************    T(F): 98.2 (09-22-19 @ 05:58)  HR: 74 (09-22-19 @ 05:58) (73 - 95)  BP: 157/67 (09-22-19 @ 05:58) (101/55 - 157/67)  RR: 17 (09-22-19 @ 05:58) (17 - 18)  SpO2: 96% (09-21-19 @ 19:39) (96% - 96%)    09-21-19 @ 07:01  -  09-22-19 @ 07:00  --------------------------------------------------------  IN: 500 mL / OUT: 702 mL / NET: -202 mL            09-21-19 @ 07:01  -  09-22-19 @ 07:00  --------------------------------------------------------  IN: 500 mL / OUT: 702 mL / NET: -202 mL        ******************************** PHYSICAL EXAM:**************************************************  GENERAL: NAD    PSYCH: no agitation, baseline mentation  HEENT:     NERVOUS SYSTEM:  Alert & Oriented X3, MS  5/5 B/L  UE and LE ; Sensory intact    PULMONARY: MATTEO, CTA    CARDIOVASCULAR: S1S2 RRR    GI: Soft, NT, ND; BS present.    EXTREMITIES:  2+ Peripheral Pulses, No clubbing, cyanosis, or edema    LYMPH: No lymphadenopathy noted    SKIN: No rashes or lesions    ******************************************************************************************      **************************** LABS *******************************************************                          12.5   4.98  )-----------( 204      ( 21 Sep 2019 06:51 )             38.2     09-21    140  |  101  |  8<L>  ----------------------------<  117<H>  4.1   |  28  |  0.8    Ca    10.0      21 Sep 2019 06:51  Phos  2.8     09-21  Mg     1.9     09-21    TPro  6.5  /  Alb  3.8  /  TBili  0.5  /  DBili  x   /  AST  32  /  ALT  13  /  AlkPhos  123<H>  09-21        PT/INR - ( 21 Sep 2019 06:51 )   PT: 11.10 sec;   INR: 0.96 ratio         PTT - ( 21 Sep 2019 06:51 )  PTT:35.2 sec  Lactate Trend    CARDIAC MARKERS ( 21 Sep 2019 06:51 )  x     / x     / 186 U/L / x     / x          CAPILLARY BLOOD GLUCOSE      POCT Blood Glucose.: 109 mg/dL (22 Sep 2019 07:41)          **************************Active Medications *******************************************  No Known Allergies      acetaminophen   Tablet .. 650 milliGRAM(s) Oral every 6 hours PRN  chlorhexidine 4% Liquid 1 Application(s) Topical <User Schedule>  dextrose 40% Gel 15 Gram(s) Oral once PRN  dextrose 5%. 1000 milliLiter(s) IV Continuous <Continuous>  dextrose 50% Injectable 12.5 Gram(s) IV Push once  dextrose 50% Injectable 25 Gram(s) IV Push once  dextrose 50% Injectable 25 Gram(s) IV Push once  enoxaparin Injectable 40 milliGRAM(s) SubCutaneous daily  gabapentin 300 milliGRAM(s) Oral three times a day  glucagon  Injectable 1 milliGRAM(s) IntraMuscular once PRN  influenza   Vaccine 0.5 milliLiter(s) IntraMuscular once  levothyroxine 150 MICROGram(s) Oral daily      ***************************************************  RADIOLOGY & ADDITIONAL TESTS:    Imaging Personally Reviewed:  [ ] YES  [ ] NO    HEALTH ISSUES - PROBLEM Dx:
MELANIA TRISTAN  87y  Female      Patient is a 87y old  Female who presents with a chief complaint of mechanical fall (22 Sep 2019 10:57)      INTERVAL HPI/OVERNIGHT EVENTS:      ******************************* REVIEW OF SYSTEMS:**********************************************  resting in bed.  All other review of systems negative    *********************** VITALS ******************************************    T(F): 97.4 (09-23-19 @ 06:04)  HR: 76 (09-23-19 @ 06:04) (71 - 76)  BP: 184/81 (09-23-19 @ 06:04) (111/56 - 184/81)  RR: 17 (09-23-19 @ 06:04) (17 - 18)  SpO2: --    09-22-19 @ 07:01  -  09-23-19 @ 07:00  --------------------------------------------------------  IN: 0 mL / OUT: 150 mL / NET: -150 mL            09-22-19 @ 07:01  -  09-23-19 @ 07:00  --------------------------------------------------------  IN: 0 mL / OUT: 150 mL / NET: -150 mL        ******************************** PHYSICAL EXAM:**************************************************  GENERAL: NAD    PSYCH: no agitation, baseline mentation  HEENT:     NERVOUS SYSTEM:  Alert & Oriented X3,     PULMONARY: MATTEO, CTA    CARDIOVASCULAR: S1S2 RRR    GI: Soft, NT, ND; BS present.    EXTREMITIES:  2+ Peripheral Pulses, No clubbing, cyanosis, or edema    LYMPH: No lymphadenopathy noted    SKIN: No rashes or lesions    ******************************************************************************************      **************************** LABS *******************************************************                  Lactate Trend        CAPILLARY BLOOD GLUCOSE      POCT Blood Glucose.: 123 mg/dL (23 Sep 2019 11:43)          **************************Active Medications *******************************************  No Known Allergies      acetaminophen   Tablet .. 650 milliGRAM(s) Oral every 6 hours PRN  chlorhexidine 4% Liquid 1 Application(s) Topical <User Schedule>  dextrose 40% Gel 15 Gram(s) Oral once PRN  dextrose 5%. 1000 milliLiter(s) IV Continuous <Continuous>  dextrose 50% Injectable 12.5 Gram(s) IV Push once  dextrose 50% Injectable 25 Gram(s) IV Push once  dextrose 50% Injectable 25 Gram(s) IV Push once  enoxaparin Injectable 40 milliGRAM(s) SubCutaneous daily  gabapentin 300 milliGRAM(s) Oral three times a day  glucagon  Injectable 1 milliGRAM(s) IntraMuscular once PRN  influenza   Vaccine 0.5 milliLiter(s) IntraMuscular once  levothyroxine 150 MICROGram(s) Oral daily      ***************************************************  RADIOLOGY & ADDITIONAL TESTS:    Imaging Personally Reviewed:  [ ] YES  [ ] NO    HEALTH ISSUES - PROBLEM Dx:
MELANIA TRISTAN  [facility] [address] ([facility])      Patient is a 87y old  Female who presents with a chief complaint of mechanical fall (20 Sep 2019 15:07)      HPI:  86 yo F with PMH of hypothyroidism, DM2, neuropathy, and OA presented to ED after mechanical fall. Patient was walking with walker in apartment when walker tipped over and patient fell onto knees. Was complaining of back and knee pain after fall. Was unable to get up after fall. Reports she was on floor for >1 hour before niece came to apartment. Denies any dizziness, chest pain, palpitations, headaches, SOB, N/V/C/D, or other complaint or symptoms.    Patient had pan-CT that was negative in ED. Was initially admitted to OBS. Was seen by Physiatry who recommended SNF. Admitted for placement. (20 Sep 2019 15:07)    Interval Events:  Patient seen and examined at bedside. She currently c/o pain in b/l LE and some rib pain, but that it is improving. She is awaiting placement. No other events noted overnight.     -PMHx: Hypothyroidism [Active]  Osteoarthritis [Active]  DM (diabetes mellitus) [Active]  Hypothyroidism [Inactive]    -PSHx:History of lumpectomy  History of cholecystectomy  History of hysterectomy    -FAMILY HISTORY:  No pertinent family history in first degree relatives      REVIEW OF SYSTEMS:  CONSTITUTIONAL: No fever, weight loss, or fatigue  RESPIRATORY: No cough, wheezing, chills or hemoptysis; No shortness of breath  CARDIOVASCULAR: No chest pain, palpitations, dizziness, or leg swelling  GASTROINTESTINAL: No abdominal or epigastric pain. No nausea, vomiting, or hematemesis; No diarrhea or constipation. No melena or hematochezia.  NEUROLOGICAL: No headaches  LYMPH NODES: No enlarged glands  MUSCULOSKELETAL: Pain in b/l knees and rib    T(C): , Max: 36.8 (09-22-19 @ 05:58)  HR: 74 (09-22-19 @ 05:58)  BP: 157/67 (09-22-19 @ 05:58)  RR: 17 (09-22-19 @ 05:58)  SpO2: 96% (09-21-19 @ 19:39)  CAPILLARY BLOOD GLUCOSE      POCT Blood Glucose.: 109 mg/dL (22 Sep 2019 07:41)  POCT Blood Glucose.: 128 mg/dL (21 Sep 2019 20:43)  POCT Blood Glucose.: 131 mg/dL (21 Sep 2019 16:52)  POCT Blood Glucose.: 185 mg/dL (21 Sep 2019 11:33)      PHYSICAL EXAM:  GEN: NAD, comfortable  CARDIO: RRR, no m/r/g  RESP: CTAB, no w/r/r  ABD: soft, NT/ND, +BS  EXT: no edema, pp b/l  NEURO: AAOx3, grossly normal      LABS:        PT/INR - ( 21 Sep 2019 06:51 )   PT: 11.10 sec;   INR: 0.96 ratio         PTT - ( 21 Sep 2019 06:51 )  PTT:35.2 sec      Microbiology:      RADIOLOGY & ADDITIONAL TESTS:        Medications:  acetaminophen   Tablet .. 650 milliGRAM(s) Oral every 6 hours PRN  chlorhexidine 4% Liquid 1 Application(s) Topical <User Schedule>  dextrose 40% Gel 15 Gram(s) Oral once PRN  dextrose 5%. 1000 milliLiter(s) IV Continuous <Continuous>  dextrose 50% Injectable 12.5 Gram(s) IV Push once  dextrose 50% Injectable 25 Gram(s) IV Push once  dextrose 50% Injectable 25 Gram(s) IV Push once  enoxaparin Injectable 40 milliGRAM(s) SubCutaneous daily  gabapentin 300 milliGRAM(s) Oral three times a day  glucagon  Injectable 1 milliGRAM(s) IntraMuscular once PRN  influenza   Vaccine 0.5 milliLiter(s) IntraMuscular once  levothyroxine 150 MICROGram(s) Oral daily

## 2019-09-24 NOTE — DISCHARGE NOTE PROVIDER - HOSPITAL COURSE
h and p    86 yo F with PMH of hypothyroidism, DM2, neuropathy, and OA presented to ED after mechanical fall. Patient was walking with walker in apartment when walker tipped over and patient fell onto knees. Was complaining of back and knee pain after fall. Was unable to get up after fall. Reports she was on floor for >1 hour before niece came to apartment. Denies any dizziness, chest pain, palpitations, headaches, SOB, N/V/C/D, or other complaint or symptoms.        Patient had pan-CT that was negative in ED. Was initially admitted to OBS. Was seen by Physiatry who recommended SNF.        Interval events:    the patient was admitted to medicine. Pt worked with her and confirmed that she is appropriate for STR facility. Discharge planning was started, [facility] was sent out and a [facility] was [facility]. [facility]. On the evening prior to discharge she was noted to have high blood pressure and lisinopril 5mg daily was recommended. The patient herself refused this medication (her BP was all right at the time), stating that she has not reacted well to blood pressure medications in the past and can not take any blood pressure medications. She is considered medically stable for discharge at this time with outpatient follow up with her primary care doctor regarding the need for standing blood pressure medications. h and p    86 yo F with PMH of hypothyroidism, DM2, neuropathy, and OA presented to ED after mechanical fall. Patient was walking with walker in apartment when walker tipped over and patient fell onto knees. Was complaining of back and knee pain after fall. Was unable to get up after fall. Reports she was on floor for >1 hour before niece came to apartment. Denies any dizziness, chest pain, palpitations, headaches, SOB, N/V/C/D, or other complaint or symptoms.        Patient had pan-CT that was negative in ED. Was initially admitted to OBS. Was seen by Physiatry who recommended SNF.        Interval events:    the patient was admitted to medicine. Pt worked with her and confirmed that she is appropriate for STR facility. Discharge planning was started, [facility] was sent [facility] and [facility] was [facility]. [facility]. On the evening prior to discharge she was noted to have high blood pressure and lisinopril 5mg daily was recommended. The patient herself refused this medication (her BP was all right at the time), stating that she has not reacted well to blood pressure medications in the past and can not take any blood pressure medications. after discussion with resident and counseling she agreed to try lisinopril 5mg daily. Reaction in the past was hypotension, and she does not remember which drug it was. She is considered medically stable for discharge at this time with outpatient follow up with her primary care doctor.

## 2019-09-24 NOTE — DISCHARGE NOTE PROVIDER - NSDCCPCAREPLAN_GEN_ALL_CORE_FT
PRINCIPAL DISCHARGE DIAGNOSIS  Diagnosis: Unable to ambulate  Assessment and Plan of Treatment: s/p fall, trauma work up reveale dno acute fractures. Patient to be discharged to [facility] for short term rehab. PRINCIPAL DISCHARGE DIAGNOSIS  Diagnosis: Unable to ambulate  Assessment and Plan of Treatment: s/p fall, trauma work up reveale dno acute fractures. Patient to be discharged to [facility] for short term rehab.      SECONDARY DISCHARGE DIAGNOSES  Diagnosis: Hypertension  Assessment and Plan of Treatment: start lisinopril 5mg daily, monitor for hypotension. adjust as needed

## 2019-09-24 NOTE — PROGRESS NOTE ADULT - ASSESSMENT
86 yo F with PMH of hypothyroidism, DM2, neuropathy, and OA presented to ED after mechanical fall.    #) Mechanical fall  - trauma workup negative  - PMR following - Rx SNF  - pending placement    #) Hypothyroidism   - c/w Synthroid    #) DM2   - holding oral agents, monitor fs, insulin basal+bolus PRN    #) Neuropathy   - c/w Gabapentin    #DVT ppx -Lovenox subQ  #Diet- carb consistent  #Activity- ambulate with assistance  #Code status- FULL  #Dispo: from home -awaiting authorization for SNF
88 yo F with PMH of hypothyroidism, DM2, neuropathy, and OA presented to ED after mechanical fall.    #) Mechanical fall  - trauma workup negative  - PMR following - Rx SNF  - pending placement    #) Hypothyroidism   - c/w Synthroid    #) DM2   - holding oral agents, monitor fs, insulin basal+bolus PRN    #) Neuropathy   - c/w Gabapentin    #DVT ppx   -Lovenox subQ    #Diet  - carb consistent    #Activity  - ambulate with assistance    #Code status  - FULL    #Dispo: from home - needs SNF    #Progress Note Handoff  Pending (specify): Auth for SNF  Family discussion: d/w the patient.   Disposition: /SNF
86 yo F with PMH of hypothyroidism, DM2, neuropathy, and OA presented to ED after mechanical fall.    #) Mechanical fall  - trauma workup negative  - PMR following - Rx SNF  - pending placement    #) Hypothyroidism   - c/w Synthroid    #) DM2   - holding oral agents, monitor fs, insulin basal+bolus PRN    #) Neuropathy   - c/w Gabapentin    #DVT ppx   -Lovenox subQ    #Diet  - carb consistent    #Activity  - ambulate with assistance    #Code status  - FULL    #Dispo: from home - needs SNF    #Progress Note Handoff  Pending (specify): Placement.   Family discussion: d/w the patient.   Disposition: /SNF
88 yo F with PMH of hypothyroidism, DM2, neuropathy, and OA presented to ED after mechanical fall.    #) Mechanical fall  - trauma workup negative  - PMR following - Rx SNF  - pending placement    #) Hypothyroidism   - c/w Synthroid    #) DM2   - holding oral agents, monitor fs, insulin basal+bolus PRN    #) Neuropathy   - c/w Gabapentin    #DVT ppx   -Lovenox subQ    #Diet  - carb consistent    #Activity  - ambulate with assistance    #Code status  - FULL    #Dispo: from home - needs SNF
88 yo F with PMH of hypothyroidism, DM2, neuropathy, and OA presented to ED after mechanical fall.    #) Mechanical fall  - trauma workup negative  - PMR following - Rx SNF  - pending placement    #) Hypothyroidism   - c/w Synthroid    #) DM2   - holding oral agents, monitor fs, insulin basal+bolus PRN    #) Neuropathy   - c/w Gabapentin    #DVT ppx   -Lovenox subQ    #Diet  - carb consistent    #Activity  - ambulate with assistance    #Code status  - FULL    #Dispo: from home - needs SNF    #Progress Note Handoff  Pending (specify): Placement.   Family discussion: d/w the patient.   Disposition: /SNF

## 2019-09-27 DIAGNOSIS — Z74.2 NEED FOR ASSISTANCE AT HOME AND NO OTHER HOUSEHOLD MEMBER ABLE TO RENDER CARE: ICD-10-CM

## 2019-09-27 DIAGNOSIS — E11.40 TYPE 2 DIABETES MELLITUS WITH DIABETIC NEUROPATHY, UNSPECIFIED: ICD-10-CM

## 2019-09-27 DIAGNOSIS — E03.9 HYPOTHYROIDISM, UNSPECIFIED: ICD-10-CM

## 2019-09-27 DIAGNOSIS — R26.2 DIFFICULTY IN WALKING, NOT ELSEWHERE CLASSIFIED: ICD-10-CM

## 2019-09-27 DIAGNOSIS — M19.90 UNSPECIFIED OSTEOARTHRITIS, UNSPECIFIED SITE: ICD-10-CM

## 2019-09-27 DIAGNOSIS — M54.9 DORSALGIA, UNSPECIFIED: ICD-10-CM

## 2019-09-27 DIAGNOSIS — Z79.84 LONG TERM (CURRENT) USE OF ORAL HYPOGLYCEMIC DRUGS: ICD-10-CM

## 2019-09-27 DIAGNOSIS — M25.569 PAIN IN UNSPECIFIED KNEE: ICD-10-CM

## 2019-09-27 DIAGNOSIS — I10 ESSENTIAL (PRIMARY) HYPERTENSION: ICD-10-CM

## 2019-09-27 DIAGNOSIS — G89.29 OTHER CHRONIC PAIN: ICD-10-CM

## 2019-09-28 ENCOUNTER — OUTPATIENT (OUTPATIENT)
Dept: OUTPATIENT SERVICES | Facility: HOSPITAL | Age: 84
LOS: 1 days | Discharge: HOME | End: 2019-09-28

## 2019-09-28 DIAGNOSIS — Z90.710 ACQUIRED ABSENCE OF BOTH CERVIX AND UTERUS: Chronic | ICD-10-CM

## 2019-09-28 DIAGNOSIS — Z98.890 OTHER SPECIFIED POSTPROCEDURAL STATES: Chronic | ICD-10-CM

## 2019-09-28 DIAGNOSIS — Z90.49 ACQUIRED ABSENCE OF OTHER SPECIFIED PARTS OF DIGESTIVE TRACT: Chronic | ICD-10-CM

## 2019-09-28 DIAGNOSIS — I10 ESSENTIAL (PRIMARY) HYPERTENSION: ICD-10-CM

## 2019-09-28 PROBLEM — E03.9 HYPOTHYROIDISM, UNSPECIFIED: Chronic | Status: ACTIVE | Noted: 2019-09-20

## 2019-09-28 PROBLEM — E11.9 TYPE 2 DIABETES MELLITUS WITHOUT COMPLICATIONS: Chronic | Status: ACTIVE | Noted: 2019-09-20

## 2019-09-28 PROBLEM — M19.90 UNSPECIFIED OSTEOARTHRITIS, UNSPECIFIED SITE: Chronic | Status: ACTIVE | Noted: 2019-09-20

## 2020-01-29 ENCOUNTER — EMERGENCY (EMERGENCY)
Facility: HOSPITAL | Age: 85
LOS: 0 days | Discharge: HOME | End: 2020-01-29
Attending: EMERGENCY MEDICINE | Admitting: EMERGENCY MEDICINE
Payer: MEDICARE

## 2020-01-29 VITALS
DIASTOLIC BLOOD PRESSURE: 74 MMHG | HEART RATE: 87 BPM | SYSTOLIC BLOOD PRESSURE: 120 MMHG | TEMPERATURE: 98 F | RESPIRATION RATE: 18 BRPM

## 2020-01-29 VITALS
DIASTOLIC BLOOD PRESSURE: 67 MMHG | OXYGEN SATURATION: 99 % | HEART RATE: 69 BPM | TEMPERATURE: 96 F | SYSTOLIC BLOOD PRESSURE: 147 MMHG | RESPIRATION RATE: 18 BRPM

## 2020-01-29 DIAGNOSIS — Z98.890 OTHER SPECIFIED POSTPROCEDURAL STATES: Chronic | ICD-10-CM

## 2020-01-29 DIAGNOSIS — Z90.710 ACQUIRED ABSENCE OF BOTH CERVIX AND UTERUS: ICD-10-CM

## 2020-01-29 DIAGNOSIS — Z90.49 ACQUIRED ABSENCE OF OTHER SPECIFIED PARTS OF DIGESTIVE TRACT: Chronic | ICD-10-CM

## 2020-01-29 DIAGNOSIS — Z90.710 ACQUIRED ABSENCE OF BOTH CERVIX AND UTERUS: Chronic | ICD-10-CM

## 2020-01-29 DIAGNOSIS — L03.116 CELLULITIS OF LEFT LOWER LIMB: ICD-10-CM

## 2020-01-29 DIAGNOSIS — M79.669 PAIN IN UNSPECIFIED LOWER LEG: ICD-10-CM

## 2020-01-29 DIAGNOSIS — Z90.49 ACQUIRED ABSENCE OF OTHER SPECIFIED PARTS OF DIGESTIVE TRACT: ICD-10-CM

## 2020-01-29 DIAGNOSIS — Z98.890 OTHER SPECIFIED POSTPROCEDURAL STATES: ICD-10-CM

## 2020-01-29 LAB
ALBUMIN SERPL ELPH-MCNC: 4.1 G/DL — SIGNIFICANT CHANGE UP (ref 3.5–5.2)
ALP SERPL-CCNC: 157 U/L — HIGH (ref 30–115)
ALT FLD-CCNC: 9 U/L — SIGNIFICANT CHANGE UP (ref 0–41)
ANION GAP SERPL CALC-SCNC: 10 MMOL/L — SIGNIFICANT CHANGE UP (ref 7–14)
AST SERPL-CCNC: 23 U/L — SIGNIFICANT CHANGE UP (ref 0–41)
BASOPHILS # BLD AUTO: 0.02 K/UL — SIGNIFICANT CHANGE UP (ref 0–0.2)
BASOPHILS NFR BLD AUTO: 0.5 % — SIGNIFICANT CHANGE UP (ref 0–1)
BILIRUB SERPL-MCNC: 0.5 MG/DL — SIGNIFICANT CHANGE UP (ref 0.2–1.2)
BUN SERPL-MCNC: 8 MG/DL — LOW (ref 10–20)
CALCIUM SERPL-MCNC: 10.1 MG/DL — SIGNIFICANT CHANGE UP (ref 8.5–10.1)
CHLORIDE SERPL-SCNC: 102 MMOL/L — SIGNIFICANT CHANGE UP (ref 98–110)
CO2 SERPL-SCNC: 27 MMOL/L — SIGNIFICANT CHANGE UP (ref 17–32)
CREAT SERPL-MCNC: 0.9 MG/DL — SIGNIFICANT CHANGE UP (ref 0.7–1.5)
EOSINOPHIL # BLD AUTO: 0.06 K/UL — SIGNIFICANT CHANGE UP (ref 0–0.7)
EOSINOPHIL NFR BLD AUTO: 1.6 % — SIGNIFICANT CHANGE UP (ref 0–8)
GLUCOSE SERPL-MCNC: 153 MG/DL — HIGH (ref 70–99)
HCT VFR BLD CALC: 33.6 % — LOW (ref 37–47)
HGB BLD-MCNC: 10.7 G/DL — LOW (ref 12–16)
IMM GRANULOCYTES NFR BLD AUTO: 0.3 % — SIGNIFICANT CHANGE UP (ref 0.1–0.3)
LYMPHOCYTES # BLD AUTO: 0.48 K/UL — LOW (ref 1.2–3.4)
LYMPHOCYTES # BLD AUTO: 13.1 % — LOW (ref 20.5–51.1)
MCHC RBC-ENTMCNC: 28.1 PG — SIGNIFICANT CHANGE UP (ref 27–31)
MCHC RBC-ENTMCNC: 31.8 G/DL — LOW (ref 32–37)
MCV RBC AUTO: 88.2 FL — SIGNIFICANT CHANGE UP (ref 81–99)
MONOCYTES # BLD AUTO: 0.29 K/UL — SIGNIFICANT CHANGE UP (ref 0.1–0.6)
MONOCYTES NFR BLD AUTO: 7.9 % — SIGNIFICANT CHANGE UP (ref 1.7–9.3)
NEUTROPHILS # BLD AUTO: 2.8 K/UL — SIGNIFICANT CHANGE UP (ref 1.4–6.5)
NEUTROPHILS NFR BLD AUTO: 76.6 % — HIGH (ref 42.2–75.2)
NRBC # BLD: 0 /100 WBCS — SIGNIFICANT CHANGE UP (ref 0–0)
PLATELET # BLD AUTO: 179 K/UL — SIGNIFICANT CHANGE UP (ref 130–400)
POTASSIUM SERPL-MCNC: 3.7 MMOL/L — SIGNIFICANT CHANGE UP (ref 3.5–5)
POTASSIUM SERPL-SCNC: 3.7 MMOL/L — SIGNIFICANT CHANGE UP (ref 3.5–5)
PROT SERPL-MCNC: 7.3 G/DL — SIGNIFICANT CHANGE UP (ref 6–8)
RBC # BLD: 3.81 M/UL — LOW (ref 4.2–5.4)
RBC # FLD: 14.1 % — SIGNIFICANT CHANGE UP (ref 11.5–14.5)
SODIUM SERPL-SCNC: 139 MMOL/L — SIGNIFICANT CHANGE UP (ref 135–146)
WBC # BLD: 3.66 K/UL — LOW (ref 4.8–10.8)
WBC # FLD AUTO: 3.66 K/UL — LOW (ref 4.8–10.8)

## 2020-01-29 PROCEDURE — 99285 EMERGENCY DEPT VISIT HI MDM: CPT

## 2020-01-29 PROCEDURE — 93970 EXTREMITY STUDY: CPT | Mod: 26

## 2020-01-29 RX ORDER — GABAPENTIN 400 MG/1
1 CAPSULE ORAL
Qty: 0 | Refills: 0 | DISCHARGE

## 2020-01-29 RX ORDER — LEVOTHYROXINE SODIUM 125 MCG
1 TABLET ORAL
Qty: 0 | Refills: 0 | DISCHARGE

## 2020-01-29 RX ORDER — AMPICILLIN SODIUM AND SULBACTAM SODIUM 250; 125 MG/ML; MG/ML
3 INJECTION, POWDER, FOR SUSPENSION INTRAMUSCULAR; INTRAVENOUS ONCE
Refills: 0 | Status: COMPLETED | OUTPATIENT
Start: 2020-01-29 | End: 2020-01-29

## 2020-01-29 RX ADMIN — AMPICILLIN SODIUM AND SULBACTAM SODIUM 200 GRAM(S): 250; 125 INJECTION, POWDER, FOR SUSPENSION INTRAMUSCULAR; INTRAVENOUS at 15:44

## 2020-01-29 NOTE — ED PROVIDER NOTE - ATTENDING CONTRIBUTION TO CARE
87 yo F PMHx noted presents from HIP center with c/o LLE redness and swelling.  Pt sent for sono of leg, states that she has h/o DVT in that leg and it is always slightly swollen, no h/o trauma, no fevers or chills.  On exam pt in NAD AAO x 3, ambulates with walker, + LLE swelling with erythema and warmth to left lower leg, + DP pulses, no rash,

## 2020-01-29 NOTE — ED PROVIDER NOTE - PATIENT PORTAL LINK FT
You can access the FollowMyHealth Patient Portal offered by [facility] by registering at the following website: [email or web address]. By joining [employer]’s FollowMyHealth portal, you will also be able to view your health information using other applications (apps) compatible with our system.

## 2020-01-29 NOTE — ED ADULT NURSE NOTE - NSIMPLEMENTINTERV_GEN_ALL_ED
Implemented All Fall with Harm Risk Interventions:  [place] to call system. Call bell, personal items and telephone within reach. Instruct patient to call for assistance. Room bathroom lighting operational. Non-slip footwear when patient is off stretcher. Physically safe environment: no spills, clutter or unnecessary equipment. Stretcher in lowest position, wheels locked, appropriate side rails in place. Provide visual cue, wrist band, yellow gown, etc. Monitor gait and stability. Monitor for mental status changes and reorient to person, place, and time. Review medications for side effects contributing to fall risk. Reinforce activity limits and safety measures with patient and family. Provide visual clues: red socks.

## 2020-01-29 NOTE — ED PROVIDER NOTE - OBJECTIVE STATEMENT
this is a 89 yo female presents to ed for evaluation of left lower leg pain and redness. patient went to hip center and sent to ed for evaluation .

## 2020-01-29 NOTE — ED PROVIDER NOTE - CLINICAL SUMMARY MEDICAL DECISION MAKING FREE TEXT BOX
Duplex with left chronic popliteal dvt.  Results reviewed and d/w patient.  Will start po abx at this time.  Strict return precautions discussed with patient.  She will f/u with her Doctor this week, but will return to nearest ED if any worsening symptoms or concerns.

## 2020-01-29 NOTE — ED PROVIDER NOTE - NS ED ROS FT
Review of Systems:  	•	CONSTITUTIONAL - no fever, no diaphoresis, no chills  	•	SKIN - redness to left lower extremity   	•	HEMATOLOGIC - no bleeding, no bruising  	•	EYES - no eye pain, no blurry vision  	•	ENT - no change in hearing, no sore throat, no ear pain or tinnitus  	•	RESPIRATORY - no shortness of breath, no cough  	•	CARDIAC - no chest pain, no palpitations  	  	•	MUSCULOSKELETAL - no joint paint, positive swelling and redness left lower leg  	•	NEUROLOGIC - no weakness, no headache, no paresthesias, no LOC  	•	PSYCH - no anxiety, non suicidal, non homicidal, no hallucination, no depression

## 2020-01-29 NOTE — ED PROVIDER NOTE - PHYSICAL EXAMINATION
--EXAM--  VITAL SIGNS: I have reviewed vs documented at present.  CONSTITUTIONAL: Well-developed; well-nourished; in no acute distress.   SKIN: Warm and dry, no acute rash.     CARD: S1, S2, Regular rate and rhythm.   RESP: No wheezes, rales or rhonchi.  ABD: Normal bowel sounds; soft; non-distended; non-tender.  EXT:left lower extremity positive swelling and erythema no tenderness to palpation   NEURO: Alert, oriented, grossly unremarkable. Strength 5/5 in all extremities. Sensation intact throughout.  PSYCH: Cooperative, appropriate.

## 2020-11-16 NOTE — ED ADULT TRIAGE NOTE - ESI TRIAGE ACUITY LEVEL, MLM
No answer on her phone number  Informed son of results-he would like [facility] informed  Call to [facility], [place] [address] and informed of stable labs and no change in POC.      3 4

## 2021-09-26 ENCOUNTER — INPATIENT (INPATIENT)
Facility: HOSPITAL | Age: 86
LOS: 3 days | Discharge: NURSING, REHAB OR LONG-TERM CARE | End: 2021-09-30
Attending: INTERNAL MEDICINE | Admitting: INTERNAL MEDICINE
Payer: MEDICARE

## 2021-09-26 VITALS
WEIGHT: 179.9 LBS | RESPIRATION RATE: 20 BRPM | OXYGEN SATURATION: 96 % | HEART RATE: 83 BPM | HEIGHT: 65 IN | DIASTOLIC BLOOD PRESSURE: 79 MMHG | SYSTOLIC BLOOD PRESSURE: 183 MMHG | TEMPERATURE: 98 F

## 2021-09-26 DIAGNOSIS — T79.6XXA TRAUMATIC ISCHEMIA OF MUSCLE, INITIAL ENCOUNTER: ICD-10-CM

## 2021-09-26 DIAGNOSIS — I10 ESSENTIAL (PRIMARY) HYPERTENSION: ICD-10-CM

## 2021-09-26 DIAGNOSIS — Z90.49 ACQUIRED ABSENCE OF OTHER SPECIFIED PARTS OF DIGESTIVE TRACT: ICD-10-CM

## 2021-09-26 DIAGNOSIS — Z90.49 ACQUIRED ABSENCE OF OTHER SPECIFIED PARTS OF DIGESTIVE TRACT: Chronic | ICD-10-CM

## 2021-09-26 DIAGNOSIS — E03.9 HYPOTHYROIDISM, UNSPECIFIED: ICD-10-CM

## 2021-09-26 DIAGNOSIS — Z90.710 ACQUIRED ABSENCE OF BOTH CERVIX AND UTERUS: ICD-10-CM
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
## 2021-09-26 DIAGNOSIS — Z98.890 OTHER SPECIFIED POSTPROCEDURAL STATES: Chronic | ICD-10-CM

## 2021-09-26 DIAGNOSIS — E87.6 HYPOKALEMIA: ICD-10-CM
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
## 2021-09-26 DIAGNOSIS — K43.9 VENTRAL HERNIA WITHOUT OBSTRUCTION OR GANGRENE: ICD-10-CM

## 2021-09-26 DIAGNOSIS — D72.829 ELEVATED WHITE BLOOD CELL COUNT, UNSPECIFIED: ICD-10-CM

## 2021-09-26 DIAGNOSIS — M62.82 RHABDOMYOLYSIS: ICD-10-CM

## 2021-09-26 DIAGNOSIS — Z79.84 LONG TERM (CURRENT) USE OF ORAL HYPOGLYCEMIC DRUGS: ICD-10-CM

## 2021-09-26 DIAGNOSIS — S40.812A ABRASION OF LEFT UPPER ARM, INITIAL ENCOUNTER: ICD-10-CM

## 2021-09-26 DIAGNOSIS — N28.1 CYST OF KIDNEY, ACQUIRED: ICD-10-CM

## 2021-09-26 DIAGNOSIS — E11.65 TYPE 2 DIABETES MELLITUS WITH HYPERGLYCEMIA: ICD-10-CM
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
## 2021-09-26 DIAGNOSIS — M19.012 PRIMARY OSTEOARTHRITIS, LEFT SHOULDER: ICD-10-CM

## 2021-09-26 DIAGNOSIS — R26.89 OTHER ABNORMALITIES OF GAIT AND MOBILITY: ICD-10-CM

## 2021-09-26 DIAGNOSIS — E53.8 DEFICIENCY OF OTHER SPECIFIED B GROUP VITAMINS: ICD-10-CM

## 2021-09-26 DIAGNOSIS — Z90.710 ACQUIRED ABSENCE OF BOTH CERVIX AND UTERUS: Chronic | ICD-10-CM

## 2021-09-26 DIAGNOSIS — E66.3 OVERWEIGHT: ICD-10-CM

## 2021-09-26 DIAGNOSIS — E87.1 HYPO-OSMOLALITY AND HYPONATREMIA: ICD-10-CM

## 2021-09-26 LAB
ALBUMIN SERPL ELPH-MCNC: 4.1 G/DL — SIGNIFICANT CHANGE UP (ref 3.5–5.2)
ALP SERPL-CCNC: 119 U/L — HIGH (ref 30–115)
ALT FLD-CCNC: 18 U/L — SIGNIFICANT CHANGE UP (ref 0–41)
ANION GAP SERPL CALC-SCNC: 15 MMOL/L — HIGH (ref 7–14)
APPEARANCE UR: CLEAR — SIGNIFICANT CHANGE UP
APTT BLD: 20.5 SEC — CRITICAL LOW (ref 27–39.2)
AST SERPL-CCNC: 33 U/L — SIGNIFICANT CHANGE UP (ref 0–41)
BACTERIA # UR AUTO: NEGATIVE — SIGNIFICANT CHANGE UP
BASOPHILS # BLD AUTO: 0.03 K/UL — SIGNIFICANT CHANGE UP (ref 0–0.2)
BASOPHILS NFR BLD AUTO: 0.2 % — SIGNIFICANT CHANGE UP (ref 0–1)
BILIRUB SERPL-MCNC: 0.7 MG/DL — SIGNIFICANT CHANGE UP (ref 0.2–1.2)
BILIRUB UR-MCNC: NEGATIVE — SIGNIFICANT CHANGE UP
BUN SERPL-MCNC: 18 MG/DL — SIGNIFICANT CHANGE UP (ref 10–20)
CALCIUM SERPL-MCNC: 9.8 MG/DL — SIGNIFICANT CHANGE UP (ref 8.5–10.1)
CHLORIDE SERPL-SCNC: 99 MMOL/L — SIGNIFICANT CHANGE UP (ref 98–110)
CK SERPL-CCNC: 534 U/L — HIGH (ref 0–225)
CO2 SERPL-SCNC: 19 MMOL/L — SIGNIFICANT CHANGE UP (ref 17–32)
COLOR SPEC: SIGNIFICANT CHANGE UP
CREAT SERPL-MCNC: 0.9 MG/DL — SIGNIFICANT CHANGE UP (ref 0.7–1.5)
DIFF PNL FLD: ABNORMAL
EOSINOPHIL # BLD AUTO: 0.01 K/UL — SIGNIFICANT CHANGE UP (ref 0–0.7)
EOSINOPHIL NFR BLD AUTO: 0.1 % — SIGNIFICANT CHANGE UP (ref 0–8)
EPI CELLS # UR: 1 /HPF — SIGNIFICANT CHANGE UP (ref 0–5)
ETHANOL SERPL-MCNC: <10 MG/DL — SIGNIFICANT CHANGE UP
GLUCOSE SERPL-MCNC: 209 MG/DL — HIGH (ref 70–99)
GLUCOSE UR QL: SIGNIFICANT CHANGE UP
HCT VFR BLD CALC: 42.3 % — SIGNIFICANT CHANGE UP (ref 37–47)
HGB BLD-MCNC: 14.5 G/DL — SIGNIFICANT CHANGE UP (ref 12–16)
HYALINE CASTS # UR AUTO: 1 /LPF — SIGNIFICANT CHANGE UP (ref 0–7)
IMM GRANULOCYTES NFR BLD AUTO: 0.8 % — HIGH (ref 0.1–0.3)
INR BLD: 1.02 RATIO — SIGNIFICANT CHANGE UP (ref 0.65–1.3)
KETONES UR-MCNC: NEGATIVE — SIGNIFICANT CHANGE UP
LACTATE SERPL-SCNC: 2.1 MMOL/L — HIGH (ref 0.7–2)
LACTATE SERPL-SCNC: 3 MMOL/L — HIGH (ref 0.7–2)
LEUKOCYTE ESTERASE UR-ACNC: NEGATIVE — SIGNIFICANT CHANGE UP
LIDOCAIN IGE QN: 22 U/L — SIGNIFICANT CHANGE UP (ref 7–60)
LYMPHOCYTES # BLD AUTO: 0.56 K/UL — LOW (ref 1.2–3.4)
LYMPHOCYTES # BLD AUTO: 3.2 % — LOW (ref 20.5–51.1)
MCHC RBC-ENTMCNC: 30.9 PG — SIGNIFICANT CHANGE UP (ref 27–31)
MCHC RBC-ENTMCNC: 34.3 G/DL — SIGNIFICANT CHANGE UP (ref 32–37)
MCV RBC AUTO: 90 FL — SIGNIFICANT CHANGE UP (ref 81–99)
MONOCYTES # BLD AUTO: 1.45 K/UL — HIGH (ref 0.1–0.6)
MONOCYTES NFR BLD AUTO: 8.2 % — SIGNIFICANT CHANGE UP (ref 1.7–9.3)
NEUTROPHILS # BLD AUTO: 15.57 K/UL — HIGH (ref 1.4–6.5)
NEUTROPHILS NFR BLD AUTO: 87.5 % — HIGH (ref 42.2–75.2)
NITRITE UR-MCNC: NEGATIVE — SIGNIFICANT CHANGE UP
NRBC # BLD: 0 /100 WBCS — SIGNIFICANT CHANGE UP (ref 0–0)
PH UR: 6 — SIGNIFICANT CHANGE UP (ref 5–8)
PLATELET # BLD AUTO: 153 K/UL — SIGNIFICANT CHANGE UP (ref 130–400)
POTASSIUM SERPL-MCNC: 4.2 MMOL/L — SIGNIFICANT CHANGE UP (ref 3.5–5)
POTASSIUM SERPL-SCNC: 4.2 MMOL/L — SIGNIFICANT CHANGE UP (ref 3.5–5)
PROT SERPL-MCNC: 6.8 G/DL — SIGNIFICANT CHANGE UP (ref 6–8)
PROT UR-MCNC: ABNORMAL
PROTHROM AB SERPL-ACNC: 11.7 SEC — SIGNIFICANT CHANGE UP (ref 9.95–12.87)
RBC # BLD: 4.7 M/UL — SIGNIFICANT CHANGE UP (ref 4.2–5.4)
RBC # FLD: 12.6 % — SIGNIFICANT CHANGE UP (ref 11.5–14.5)
RBC CASTS # UR COMP ASSIST: 1 /HPF — SIGNIFICANT CHANGE UP (ref 0–4)
SARS-COV-2 RNA SPEC QL NAA+PROBE: SIGNIFICANT CHANGE UP
SODIUM SERPL-SCNC: 133 MMOL/L — LOW (ref 135–146)
SP GR SPEC: 1.01 — SIGNIFICANT CHANGE UP (ref 1.01–1.03)
UROBILINOGEN FLD QL: SIGNIFICANT CHANGE UP
WBC # BLD: 17.77 K/UL — HIGH (ref 4.8–10.8)
WBC # FLD AUTO: 17.77 K/UL — HIGH (ref 4.8–10.8)
WBC UR QL: 3 /HPF — SIGNIFICANT CHANGE UP (ref 0–5)

## 2021-09-26 PROCEDURE — 74176 CT ABD & PELVIS W/O CONTRAST: CPT | Mod: 26,MA

## 2021-09-26 PROCEDURE — 72125 CT NECK SPINE W/O DYE: CPT | Mod: 26,MA

## 2021-09-26 PROCEDURE — 70450 CT HEAD/BRAIN W/O DYE: CPT | Mod: 26,MA

## 2021-09-26 PROCEDURE — 71045 X-RAY EXAM CHEST 1 VIEW: CPT | Mod: 26

## 2021-09-26 PROCEDURE — 99223 1ST HOSP IP/OBS HIGH 75: CPT

## 2021-09-26 PROCEDURE — 71250 CT THORAX DX C-: CPT | Mod: 26,MA

## 2021-09-26 PROCEDURE — 99285 EMERGENCY DEPT VISIT HI MDM: CPT

## 2021-09-26 PROCEDURE — 72170 X-RAY EXAM OF PELVIS: CPT | Mod: 26

## 2021-09-26 PROCEDURE — 93010 ELECTROCARDIOGRAM REPORT: CPT

## 2021-09-26 RX ORDER — GABAPENTIN 400 MG/1
300 CAPSULE ORAL THREE TIMES A DAY
Refills: 0 | Status: DISCONTINUED | OUTPATIENT
Start: 2021-09-26 | End: 2021-09-30

## 2021-09-26 RX ORDER — ENOXAPARIN SODIUM 100 MG/ML
40 INJECTION SUBCUTANEOUS AT BEDTIME
Refills: 0 | Status: DISCONTINUED | OUTPATIENT
Start: 2021-09-26 | End: 2021-09-30

## 2021-09-26 RX ORDER — FOLIC ACID 0.8 MG
1 TABLET ORAL DAILY
Refills: 0 | Status: DISCONTINUED | OUTPATIENT
Start: 2021-09-26 | End: 2021-09-30

## 2021-09-26 RX ORDER — FOLIC ACID 0.8 MG
1 TABLET ORAL
Qty: 0 | Refills: 0 | DISCHARGE

## 2021-09-26 RX ORDER — FERROUS SULFATE 325(65) MG
325 TABLET ORAL DAILY
Refills: 0 | Status: DISCONTINUED | OUTPATIENT
Start: 2021-09-26 | End: 2021-09-26

## 2021-09-26 RX ORDER — FERROUS SULFATE 325(65) MG
1 TABLET ORAL
Qty: 0 | Refills: 0 | DISCHARGE

## 2021-09-26 RX ORDER — ASPIRIN/CALCIUM CARB/MAGNESIUM 324 MG
81 TABLET ORAL DAILY
Refills: 0 | Status: DISCONTINUED | OUTPATIENT
Start: 2021-09-26 | End: 2021-09-30

## 2021-09-26 RX ORDER — SODIUM CHLORIDE 9 MG/ML
1000 INJECTION INTRAMUSCULAR; INTRAVENOUS; SUBCUTANEOUS
Refills: 0 | Status: DISCONTINUED | OUTPATIENT
Start: 2021-09-26 | End: 2021-09-26

## 2021-09-26 RX ORDER — SODIUM CHLORIDE 9 MG/ML
1000 INJECTION INTRAMUSCULAR; INTRAVENOUS; SUBCUTANEOUS ONCE
Refills: 0 | Status: COMPLETED | OUTPATIENT
Start: 2021-09-26 | End: 2021-09-26

## 2021-09-26 RX ORDER — ASPIRIN/CALCIUM CARB/MAGNESIUM 324 MG
1 TABLET ORAL
Qty: 0 | Refills: 0 | DISCHARGE

## 2021-09-26 RX ORDER — ATORVASTATIN CALCIUM 80 MG/1
10 TABLET, FILM COATED ORAL DAILY
Refills: 0 | Status: DISCONTINUED | OUTPATIENT
Start: 2021-09-26 | End: 2021-09-30

## 2021-09-26 RX ORDER — LEVOTHYROXINE SODIUM 125 MCG
150 TABLET ORAL DAILY
Refills: 0 | Status: DISCONTINUED | OUTPATIENT
Start: 2021-09-26 | End: 2021-09-30

## 2021-09-26 RX ADMIN — GABAPENTIN 300 MILLIGRAM(S): 400 CAPSULE ORAL at 21:51

## 2021-09-26 RX ADMIN — SODIUM CHLORIDE 1000 MILLILITER(S): 9 INJECTION INTRAMUSCULAR; INTRAVENOUS; SUBCUTANEOUS at 04:19

## 2021-09-26 RX ADMIN — ENOXAPARIN SODIUM 40 MILLIGRAM(S): 100 INJECTION SUBCUTANEOUS at 21:52

## 2021-09-26 RX ADMIN — GABAPENTIN 300 MILLIGRAM(S): 400 CAPSULE ORAL at 17:04

## 2021-09-26 NOTE — ED PROVIDER NOTE - NSICDXPASTSURGICALHX_GEN_ALL_CORE_FT
PAST SURGICAL HISTORY:  History of cholecystectomy     History of hysterectomy     History of lumpectomy

## 2021-09-26 NOTE — ED PROVIDER NOTE - CLINICAL SUMMARY MEDICAL DECISION MAKING FREE TEXT BOX
ccruz - pt signed out to me by Dr Sandoval. p/w fall, down on ground for hours. CT no acute traumatic injuries. mild elevated ck. admit to medicine for further work up and treatment, pt/rehab eval. pt lives alone unsafe dc home.

## 2021-09-26 NOTE — H&P ADULT - NSHPPHYSICALEXAM_GEN_ALL_CORE
CONSTITUTIONAL: No acute distress, well-developed, well-groomed, AAOx3  HEAD: Atraumatic, normocephalic  EYES: EOM intact, PERRLA, conjunctiva and sclera clear  ENT: Supple, no masses, no thyromegaly, no bruits, no JVD; moist mucous membranes  PULMONARY: Clear to auscultation bilaterally; no wheezes, rales, or rhonchi  CARDIOVASCULAR: Regular rate and rhythm; no murmurs, rubs, or gallops  GASTROINTESTINAL: Soft, non-tender, non-distended; bowel sounds present  MUSCULOSKELETAL: 2+ peripheral pulses; no clubbing, no cyanosis, no edema  NEUROLOGY: non-focal  SKIN: multiple bruises over her upper extremities, CONSTITUTIONAL: No acute distress, well-developed, well-groomed, AAOx3  HEAD: Atraumatic, normocephalic  EYES: EOM intact, PERRLA, conjunctiva and sclera clear  ENT: Supple, no masses, no thyromegaly, no bruits, no JVD; moist mucous membranes, malar redness  PULMONARY: Clear to auscultation bilaterally; no wheezes, rales, or rhonchi  CARDIOVASCULAR: Regular rate and rhythm; no murmurs, rubs, or gallops  GASTROINTESTINAL: Soft, non-tender, non-distended; bowel sounds present  MUSCULOSKELETAL: 2+ peripheral pulses; no clubbing, no cyanosis, no edema  NEUROLOGY: non-focal  SKIN: multiple bruises over her upper extremities,

## 2021-09-26 NOTE — ED ADULT TRIAGE NOTE - NS ED NURSE DIRECT TO ROOM YN
Discussed with patient that she has persistent anemia although stable from about 2-3 weeks ago she had upper and lower GI workup about 2 years ago she denies any symptoms she has been trying to eat balanced diet.  The only concern she has is intermittent muscle cramping I would order some lab tests CBC iron studies with magnesium as well as BMP panel  
No

## 2021-09-26 NOTE — H&P ADULT - ASSESSMENT
89 yr F with HTN, DM, hypothyroidism, osteoarthritis, questionable SLE was brought ER for evaluation of a fall,    #fall with mild rhambdo  - fall likely from imbalance  - had IVF at ER, refused to have more  - trend CPK and lactate  - PT eval  - pt lives home, doent want to go to NH, she is asking if she can get home aid  - SW consult  - pt has multiple bruises at her upper ext, old at the right side, very new at the left side, pt reports she bruises very easily since long time,     #hypothyroidism   - c/ w synthroid    #leukocytosis: pt completed 12 days of prednisone 10mg from her PCP for skin rash, exam non focal    #Questionable SLE not on any medications, not on acute flare up    #HTN: controlled with no meds  #DM: keep -180, insulin if needed  #Diet: DASH  #DVT pro: lovenox  #GI pro: not indicated  #Dispo: needs SW eval for home aid VS NH   89 yr F with HTN, DM, hypothyroidism, osteoarthritis, questionable SLE was brought ER for evaluation of a fall,    #fall with mild rhambdo  - fall likely from imbalance  - had IVF at ER, refused to have more  - trend CPK and lactate  - PT eval  - pt lives home, doent want to go to NH, she is asking if she can get home aid  - SW consult  - pt has multiple bruises at her upper ext, old at the right side, very new at the left side, pt reports she bruises very easily since long time,     #hypothyroidism   - c/ w synthroid    #leukocytosis: pt completed 12 days of prednisone 10mg from her PCP for skin rash, exam non focal    #Questionable SLE not on any medications, not on acute flare up  #suspected folate def: on folic acid  #HTN: controlled with no meds  #DM: keep -180, insulin if needed  #Diet: DASH  #DVT pro: lovenox  #GI pro: not indicated  #Dispo: needs SW eval for home aid VS NH

## 2021-09-26 NOTE — ED ADULT TRIAGE NOTE - CHIEF COMPLAINT QUOTE
BIBA with complaints of S/P fall in the bathroom, claimed has always problem with her balance., + abrasion to left arm.  Denies hitting her head, no blood thinners. Pt was in the bathroom floor since 10 pm.

## 2021-09-26 NOTE — H&P ADULT - NSHPLABSRESULTS_GEN_ALL_CORE
LABS:                        14.5   17.77 )-----------( 153      ( 26 Sep 2021 03:01 )             42.3     26 Sep 2021 03:01    133    |  99     |  18     ----------------------------<  209    4.2     |  19     |  0.9      Ca    9.8        26 Sep 2021 03:01    TPro  6.8    /  Alb  4.1    /  TBili  0.7    /  DBili  x      /  AST  33     /  ALT  18     /  AlkPhos  119    26 Sep 2021 03:01    PT/INR - ( 26 Sep 2021 03:01 )   PT: 11.70 sec;   INR: 1.02 ratio         PTT - ( 26 Sep 2021 03:01 )  PTT:20.5 sec  Lactate, Blood: 3.0: Elevated lactate. Consider ordering follow-up lactate to trend. mmol/L (09.26.21 @ 03:01)  Creatine Kinase, Serum: 534: Hemolyzed. Interpret with caution U/L (09.26.21 @ 03:01)  < from: CT Chest No Cont (09.26.21 @ 08:20) >      No evidence of acute intrathoracic or intra-abdominal pathology.    < end of copied text >    < from: CT Head No Cont (09.26.21 @ 03:37) >      Impression:      No evidence of intracranial hemorrhage, territorial infarct, or mass effect. No significant change since the prior exam.    --- End of Report ---    < end of copied text >    < from: Xray Pelvis AP only (09.26.21 @ 04:50) >      Patient is rotated to the right.    Severe osteopenia without gross fracture seen.    Lower lumbar degenerative changes.    --- End of Report ---    < end of copied text >

## 2021-09-26 NOTE — H&P ADULT - ATTENDING COMMENTS
88 YO F with a PMH of HTN, DM2, hypothyroidism,  and OA who was BIBEMS for eval of left arm bruising s/p fall after standing up from toilet and losing her balance. - HT and - LOC. Remembers entire event. No preceding symptoms of CP, SOB, dizziness, focal weakness, or headaches. No seizure-like activity. ROS negative, except as stated above    In the ED, imaging was negative for acute process. CK elevated, given IVFs. Admitted for PT eval.     FMHx: Reviewed, not relevant    Physical exam shows pt in NAD. VSS, afebrile, not hypoxic on RA. A&Ox3. Head is atraumatic. Neuro shows a non-focal exam. Motor strength/sensation is intact. CTA B/L with no W/C/R. RRR, no M/G/R. ABD is soft and non-tender, normoactive BSs. Eccymosis of the LUE. No rashes. Labs and radiology as above. QTc     LUE bruising s/p mechanical fall. Imaging negative. PRN pain meds. PT consult. FAll precautions.     Elevated Ck s/p mechanical fall. IVFs. Monitor urinary out-put (Maintain 1-1.5 cc/kg/hr). If SAVANNAH and oliguria develop, alkalinize the urine with bicarb infusion or addition of bicarb to the IVFs. Trend CK/LFTs levels. Serial BMPs.     Diabetes mellitus with hyperglycemia. A1c. FSs. Insulin standing/correctional dosing    Hx of HTN, hypothyroidism, and OA. Restart home meds, except as stated above. DVT PPX. Inform PCP of pt's admission to hospital. My note supersedes the residents note.     Date seen by [email or web address]

## 2021-09-26 NOTE — ED ADULT NURSE NOTE - NSIMPLEMENTINTERV_GEN_ALL_ED
Implemented All Universal Safety Interventions:  [place] to call system. Call bell, personal items and telephone within reach. Instruct patient to call for assistance. Room bathroom lighting operational. Non-slip footwear when patient is off stretcher. Physically safe environment: no spills, clutter or unnecessary equipment. Stretcher in lowest position, wheels locked, appropriate side rails in place. Implemented All Fall with Harm Risk Interventions:  [place] to call system. Call bell, personal items and telephone within reach. Instruct patient to call for assistance. Room bathroom lighting operational. Non-slip footwear when patient is off stretcher. Physically safe environment: no spills, clutter or unnecessary equipment. Stretcher in lowest position, wheels locked, appropriate side rails in place. Provide visual cue, wrist band, yellow gown, etc. Monitor gait and stability. Monitor for mental status changes and reorient to person, place, and time. Review medications for side effects contributing to fall risk. Reinforce activity limits and safety measures with patient and family. Provide visual clues: red socks.

## 2021-09-26 NOTE — H&P ADULT - HISTORY OF PRESENT ILLNESS
89 yr F with HTN, DM, hypothyroidism, osteoarthritis, questionable SLE was brought ER for evaluation of a fall,     Pt states that she loss her balance while she was getting up from the bathroom, so she felt on the ground and was not able to get up, she called her neice and came to help her,   no syncope or LOC, seizure, fever, chills, body pain, sick contact, legs swelling, SOB, cough or bloody urine.     in ER: , VS wnl, lactate 3, received IVF, admitted for disposition

## 2021-09-26 NOTE — ED PROVIDER NOTE - ATTENDING CONTRIBUTION TO CARE
89 yr old f w/ a pmh significant for DM, hypothyroidism, osteoarthritis who presents s/p fall. Pt states that she was getting up from the bathroom when she had a mechanical fall. Pt states that she landed on her buttocks, however, was unable to get up. Pt denies hitting her head. Pt denies any chest pain, sob, nausea, vomting, fevers, chills or any pain. Pt denies any other medical complaints.     VITAL SIGNS: I have reviewed nursing notes and confirm.  CONSTITUTIONAL: non-toxic, well appearing  SKIN: no rash, no petechiae.  EYES: EOMI, pink conjunctiva, anicteric  ENT: tongue midline, no exudates, MMM  NECK: Supple; no meningismus, no JVD  CARD: RRR, no murmurs, equal radial pulses bilaterally 2+  RESP: CTAB, no respiratory distress  ABD: Soft, non-tender, non-distended, no peritoneal signs, no HSM, no CVA tenderness  EXT: Normal ROM x4. No edema. No calves tenderness. no midline tenderness on palpation of the cervical/thoracic/lumbar area. There is an abrasion noted to the L arm, not bleeding, no laceration.   NEURO: Alert, oriented. CN2-12 intact, equal strength bilaterally  PSYCH: Cooperative, appropriate.    a/p  89 yr old f who presents s/p fall   -labs  -ekg  -imaging  -pt does not want pain meds  -reassess  -dispo pending

## 2021-09-26 NOTE — ED PROVIDER NOTE - OBJECTIVE STATEMENT
Pt is an 89 yr old f w/ a pmh significant for DM, hypothyroidism, osteoarthritis who presents s/p fall. Pt states that she was getting up from the bathroom when she had a mechanical fall last night around 10pm . Pt states that she landed on her buttocks, however, was unable to get up . She did not call anyone until this am because thought she would be able to get up on her own.  Pt denies hitting her head. Pt denies any chest pain, sob, nausea, vomting, fevers, chills or any pain. Pt denies any other medical complaints.

## 2021-09-27 LAB
ALBUMIN SERPL ELPH-MCNC: 3.4 G/DL — LOW (ref 3.5–5.2)
ALP SERPL-CCNC: 96 U/L — SIGNIFICANT CHANGE UP (ref 30–115)
ALT FLD-CCNC: 20 U/L — SIGNIFICANT CHANGE UP (ref 0–41)
ANION GAP SERPL CALC-SCNC: 16 MMOL/L — HIGH (ref 7–14)
AST SERPL-CCNC: 32 U/L — SIGNIFICANT CHANGE UP (ref 0–41)
BASOPHILS # BLD AUTO: 0.02 K/UL — SIGNIFICANT CHANGE UP (ref 0–0.2)
BASOPHILS NFR BLD AUTO: 0.2 % — SIGNIFICANT CHANGE UP (ref 0–1)
BILIRUB SERPL-MCNC: 0.9 MG/DL — SIGNIFICANT CHANGE UP (ref 0.2–1.2)
BUN SERPL-MCNC: 12 MG/DL — SIGNIFICANT CHANGE UP (ref 10–20)
CALCIUM SERPL-MCNC: 8.9 MG/DL — SIGNIFICANT CHANGE UP (ref 8.5–10.1)
CHLORIDE SERPL-SCNC: 94 MMOL/L — LOW (ref 98–110)
CK SERPL-CCNC: 228 U/L — HIGH (ref 0–225)
CO2 SERPL-SCNC: 22 MMOL/L — SIGNIFICANT CHANGE UP (ref 17–32)
CREAT SERPL-MCNC: 0.7 MG/DL — SIGNIFICANT CHANGE UP (ref 0.7–1.5)
EOSINOPHIL # BLD AUTO: 0.04 K/UL — SIGNIFICANT CHANGE UP (ref 0–0.7)
EOSINOPHIL NFR BLD AUTO: 0.5 % — SIGNIFICANT CHANGE UP (ref 0–8)
GLUCOSE BLDC GLUCOMTR-MCNC: 152 MG/DL — HIGH (ref 70–99)
GLUCOSE BLDC GLUCOMTR-MCNC: 162 MG/DL — HIGH (ref 70–99)
GLUCOSE BLDC GLUCOMTR-MCNC: 239 MG/DL — HIGH (ref 70–99)
GLUCOSE SERPL-MCNC: 127 MG/DL — HIGH (ref 70–99)
HCT VFR BLD CALC: 42.7 % — SIGNIFICANT CHANGE UP (ref 37–47)
HGB BLD-MCNC: 14.5 G/DL — SIGNIFICANT CHANGE UP (ref 12–16)
IMM GRANULOCYTES NFR BLD AUTO: 0.5 % — HIGH (ref 0.1–0.3)
LACTATE SERPL-SCNC: 1.3 MMOL/L — SIGNIFICANT CHANGE UP (ref 0.7–2)
LYMPHOCYTES # BLD AUTO: 0.76 K/UL — LOW (ref 1.2–3.4)
LYMPHOCYTES # BLD AUTO: 8.9 % — LOW (ref 20.5–51.1)
MAGNESIUM SERPL-MCNC: 1.5 MG/DL — LOW (ref 1.8–2.4)
MCHC RBC-ENTMCNC: 30.7 PG — SIGNIFICANT CHANGE UP (ref 27–31)
MCHC RBC-ENTMCNC: 34 G/DL — SIGNIFICANT CHANGE UP (ref 32–37)
MCV RBC AUTO: 90.3 FL — SIGNIFICANT CHANGE UP (ref 81–99)
MONOCYTES # BLD AUTO: 0.91 K/UL — HIGH (ref 0.1–0.6)
MONOCYTES NFR BLD AUTO: 10.7 % — HIGH (ref 1.7–9.3)
NEUTROPHILS # BLD AUTO: 6.77 K/UL — HIGH (ref 1.4–6.5)
NEUTROPHILS NFR BLD AUTO: 79.2 % — HIGH (ref 42.2–75.2)
NRBC # BLD: 0 /100 WBCS — SIGNIFICANT CHANGE UP (ref 0–0)
PLATELET # BLD AUTO: 157 K/UL — SIGNIFICANT CHANGE UP (ref 130–400)
POTASSIUM SERPL-MCNC: 3.4 MMOL/L — LOW (ref 3.5–5)
POTASSIUM SERPL-SCNC: 3.4 MMOL/L — LOW (ref 3.5–5)
PROCALCITONIN SERPL-MCNC: 0.08 NG/ML — SIGNIFICANT CHANGE UP (ref 0.02–0.1)
PROT SERPL-MCNC: 5.7 G/DL — LOW (ref 6–8)
RBC # BLD: 4.73 M/UL — SIGNIFICANT CHANGE UP (ref 4.2–5.4)
RBC # FLD: 12.7 % — SIGNIFICANT CHANGE UP (ref 11.5–14.5)
SODIUM SERPL-SCNC: 132 MMOL/L — LOW (ref 135–146)
TSH SERPL-MCNC: 1.49 UIU/ML — SIGNIFICANT CHANGE UP (ref 0.27–4.2)
WBC # BLD: 8.54 K/UL — SIGNIFICANT CHANGE UP (ref 4.8–10.8)
WBC # FLD AUTO: 8.54 K/UL — SIGNIFICANT CHANGE UP (ref 4.8–10.8)

## 2021-09-27 PROCEDURE — 99232 SBSQ HOSP IP/OBS MODERATE 35: CPT

## 2021-09-27 RX ORDER — MAGNESIUM SULFATE 500 MG/ML
2 VIAL (ML) INJECTION ONCE
Refills: 0 | Status: COMPLETED | OUTPATIENT
Start: 2021-09-27 | End: 2021-09-27

## 2021-09-27 RX ORDER — INFLUENZA VIRUS VACCINE 15; 15; 15; 15 UG/.5ML; UG/.5ML; UG/.5ML; UG/.5ML
0.5 SUSPENSION INTRAMUSCULAR ONCE
Refills: 0 | Status: COMPLETED | OUTPATIENT
Start: 2021-09-27 | End: 2021-09-30

## 2021-09-27 RX ORDER — LORATADINE 10 MG/1
10 TABLET ORAL ONCE
Refills: 0 | Status: COMPLETED | OUTPATIENT
Start: 2021-09-27 | End: 2021-09-27

## 2021-09-27 RX ORDER — POTASSIUM CHLORIDE 20 MEQ
20 PACKET (EA) ORAL
Refills: 0 | Status: COMPLETED | OUTPATIENT
Start: 2021-09-27 | End: 2021-09-27

## 2021-09-27 RX ADMIN — GABAPENTIN 300 MILLIGRAM(S): 400 CAPSULE ORAL at 21:06

## 2021-09-27 RX ADMIN — GABAPENTIN 300 MILLIGRAM(S): 400 CAPSULE ORAL at 05:22

## 2021-09-27 RX ADMIN — Medication 25 GRAM(S): at 13:16

## 2021-09-27 RX ADMIN — ENOXAPARIN SODIUM 40 MILLIGRAM(S): 100 INJECTION SUBCUTANEOUS at 21:06

## 2021-09-27 RX ADMIN — Medication 20 MILLIEQUIVALENT(S): at 13:17

## 2021-09-27 RX ADMIN — Medication 81 MILLIGRAM(S): at 11:58

## 2021-09-27 RX ADMIN — Medication 1 MILLIGRAM(S): at 11:58

## 2021-09-27 RX ADMIN — Medication 20 MILLIEQUIVALENT(S): at 15:53

## 2021-09-27 RX ADMIN — ATORVASTATIN CALCIUM 10 MILLIGRAM(S): 80 TABLET, FILM COATED ORAL at 11:59

## 2021-09-27 RX ADMIN — Medication 150 MICROGRAM(S): at 05:22

## 2021-09-27 RX ADMIN — GABAPENTIN 300 MILLIGRAM(S): 400 CAPSULE ORAL at 13:17

## 2021-09-27 RX ADMIN — LORATADINE 10 MILLIGRAM(S): 10 TABLET ORAL at 18:22

## 2021-09-27 NOTE — PHYSICAL THERAPY INITIAL EVALUATION ADULT - GAIT DISTANCE, PT EVAL
2ft x2 forward and backward; 3ft x1 side stepping to R. 5ft x2 forward and backward; 3ft x1 side stepping to R.

## 2021-09-27 NOTE — PROGRESS NOTE ADULT - ASSESSMENT
HPI:  89 yr F with HTN, DM, hypothyroidism, osteoarthritis, questionable SLE was brought ER for evaluation of a fall,     Pt states that she loss her balance while she was getting up from the bathroom, so she felt on the ground and was not able to get up, she called her neice and came to help her,   no syncope or LOC, seizure, fever, chills, body pain, sick contact, legs swelling, SOB, cough or bloody urine.     in ER: , VS wnl, lactate 3, received IVF, admitted for disposition  (26 Sep 2021 15:34)    #Mechanical fall   pt     #Hypothyroidism   synthroid     #DM   CAPILLARY BLOOD GLUCOSE      POCT Blood Glucose.: 152 mg/dL (27 Sep 2021 16:15)  POCT Blood Glucose.: 162 mg/dL (27 Sep 2021 12:23)  controlled     #Overweight BMI 29 patient needs to see dieitian outpatient for further evaluation     #Ventral hernia no intervention     #Renal cyst no intervention     #Hyponatremia no intervention     #Hypokalemia replete and check in am     PROGRESS NOTE HANDOFF    Pending:  dc planning     Family discussion: family aware of plan of care     Disposition: Jania with hc

## 2021-09-27 NOTE — CONSULT NOTE ADULT - ASSESSMENT
IMPRESSION: Rehab of gait dysfunction / left shoulder OA    PRECAUTIONS: [   ] Cardiac  [   ] Respiratory  [   ] Seizures [   ] Contact Isolation  [   ] Droplet Isolation  [   ] Other    Weight Bearing Status:     RECOMMENDATION:    Out of Bed to Chair     DVT/Decubiti Prophylaxis    REHAB PLAN:     [  x  ] Bedside P/T 3-5 times a week   [    ]   Bedside O/T  2-3 times a week             [    ] Speech Therapy               [    ]  No Rehab Therapy Indicated   Conditioning/ROM                                    ADL  Bed Mobility                                               Conditioning/ROM  Transfers                                                     Bed Mobility  Sitting /Standing Balance                         Transfers                                        Gait Training                                               Sitting/Standing Balance  Stair Training [   ]Applicable                    Home equipment Eval                                                                        Splinting  [   ] Only      GOALS:   ADL   [    ]   Independent                    Transfers  [  x  ] Independent                          Ambulation  [  x  ] Independent     [ x    ] With device                            [    ]  CG                                                         [    ]  CG                                                                  [    ] CG                            [    ] Min A                                                   [    ] Min A                                                              [    ] Min  A          DISCHARGE PLAN:   [    ]  Good candidate for Intensive Rehabilitation/Hospital based                                             Will tolerate 3hrs Intensive Rehab Daily                                       [  x   ]  Short Term Rehab in Skilled Nursing Facility                     vs                 [  x   ]  Home with Outpatient or VN services                                         [     ]  Possible Candidate for Intensive Hospital based Rehab

## 2021-09-27 NOTE — CONSULT NOTE ADULT - SUBJECTIVE AND OBJECTIVE BOX
Chief Complaint   Patient presents with     RECHECK     3 month follow up        Initial /82 (BP Location: Right arm, Patient Position: Chair, Cuff Size: Adult Regular)  Pulse 72  Resp 14  LMP 03/18/2005 Estimated body mass index is 28.12 kg/(m^2) as calculated from the following:    Height as of 8/29/17: 1.524 m (5').    Weight as of 8/29/17: 65.3 kg (144 lb).  Medication Reconciliation: complete   Jenni Verdugo, CMA      
HPI:  89 yr F with HTN, DM, hypothyroidism, osteoarthritis, questionable SLE was brought ER for evaluation of a fall,     Pt states that she loss her balance while she was getting up from the bathroom, so she felt on the ground and was not able to get up, she called her neice and came to help her,   no syncope or LOC, seizure, fever, chills, body pain, sick contact, legs swelling, SOB, cough or bloody urine.     in ER: , VS wnl, lactate 3, received IVF, admitted for disposition       PAST MEDICAL & SURGICAL HISTORY:  DM (diabetes mellitus)    Osteoarthritis    Hypothyroidism    History of hysterectomy    History of cholecystectomy    History of lumpectomy        Hospital Course:    TODAY'S SUBJECTIVE & REVIEW OF SYMPTOMS:     Constitutional WNL   Cardio WNL   Resp WNL   GI WNL  Heme WNL  Endo WNL  Skin WNL  MSK Weakness  Neuro WNL  Cognitive WNL  Psych WNL      MEDICATIONS  (STANDING):  aspirin enteric coated 81 milliGRAM(s) Oral daily  atorvastatin Oral Tab/Cap - Peds 10 milliGRAM(s) Oral daily  enoxaparin Injectable 40 milliGRAM(s) SubCutaneous at bedtime  folic acid 1 milliGRAM(s) Oral daily  gabapentin 300 milliGRAM(s) Oral three times a day  influenza   Vaccine 0.5 milliLiter(s) IntraMuscular once  levothyroxine 150 MICROGram(s) Oral daily    MEDICATIONS  (PRN):      FAMILY HISTORY:  No pertinent family history in first degree relatives        Allergies    No Known Allergies    Intolerances        SOCIAL HISTORY:    [  ] Etoh  [  ] Smoking  [  ] Substance abuse     Home Environment:  [ x  ] Home Alone  [   ] Lives with Family  [   ] Home Health Aid    Dwelling:  [   ] Apartment  [ x  ] Private House  [   ] Adult Home  [   ] Skilled Nursing Facility      [   ] Short Term  [   ] Long Term  [ x  ] Stairs       Elevator [   ]    FUNCTIONAL STATUS PTA: (Check all that apply)  Ambulation: [  x  ]Independent    [   ] Dependent     [   ] Non-Ambulatory  Assistive Device: [   ] SA Cane  [   ]  Q Cane  [ x  ] Walker  [   ]  Wheelchair  ADL : [ x  ] Independent  [    ]  Dependent       Vital Signs Last 24 Hrs  T(C): 36 (27 Sep 2021 06:16), Max: 37.2 (27 Sep 2021 01:00)  T(F): 96.8 (27 Sep 2021 06:16), Max: 98.9 (27 Sep 2021 01:00)  HR: 61 (27 Sep 2021 06:16) (59 - 87)  BP: 116/57 (27 Sep 2021 06:16) (116/57 - 152/67)  BP(mean): --  RR: 18 (27 Sep 2021 06:16) (18 - 18)  SpO2: 95% (27 Sep 2021 01:00) (95% - 96%)      PHYSICAL EXAM: Awake & Alert  GENERAL: NAD  HEAD:  Normocephalic  CHEST/LUNG: Clear   HEART: S1S2+  ABDOMEN: Soft, Nontender  EXTREMITIES:  no calf tenderness    NERVOUS SYSTEM:  Cranial Nerves 2-12 intact [   ] Abnormal  [   ]  ROM: WFL all extremities [   ]  Abnormal [ x  ]limited left shoulder - chronic   Motor Strength: WFL all extremities  [   ]  Abnormal [ x  ]limited left shoulder - chronic  Sensation: intact to light touch [ x  ] Abnormal [   ]    FUNCTIONAL STATUS:  Bed Mobility: Independent [   ]  Supervision [   ]  Needs Assistance [ x  ]  N/A [   ]  Transfers: Independent [   ]  Supervision [   ]  Needs Assistance [ x  ]  N/A [   ]   Ambulation: Independent [   ]  Supervision [   ]  Needs Assistance [   ]  N/A [   ]  ADL: Independent [   ] Requires Assistance [   ] N/A [   ]      LABS:                        14.5   8.54  )-----------( 157      ( 27 Sep 2021 04:30 )             42.7     09-27    132<L>  |  94<L>  |  12  ----------------------------<  127<H>  3.4<L>   |  22  |  0.7    Ca    8.9      27 Sep 2021 04:30  Mg     1.5         TPro  5.7<L>  /  Alb  3.4<L>  /  TBili  0.9  /  DBili  x   /  AST  32  /  ALT  20  /  AlkPhos  96  09-27    PT/INR - ( 26 Sep 2021 03:01 )   PT: 11.70 sec;   INR: 1.02 ratio         PTT - ( 26 Sep 2021 03:01 )  PTT:20.5 sec  Urinalysis Basic - ( 26 Sep 2021 02:46 )    Color: Light Yellow / Appearance: Clear / S.013 / pH: x  Gluc: x / Ketone: Negative  / Bili: Negative / Urobili: <2 mg/dL   Blood: x / Protein: 30 mg/dL / Nitrite: Negative   Leuk Esterase: Negative / RBC: 1 /HPF / WBC 3 /HPF   Sq Epi: x / Non Sq Epi: 1 /HPF / Bacteria: Negative        RADIOLOGY & ADDITIONAL STUDIES:

## 2021-09-27 NOTE — PHYSICAL THERAPY INITIAL EVALUATION ADULT - PERTINENT HX OF CURRENT PROBLEM, REHAB EVAL
89 yr F with HTN, DM, hypothyroidism, osteoarthritis, questionable SLE was brought ER for evaluation of a fall.

## 2021-09-27 NOTE — PHYSICAL THERAPY INITIAL EVALUATION ADULT - GENERAL OBSERVATIONS, REHAB EVAL
Attempted to see pt for b/s PT however pt reports she just started eating breakfast, requesting PT come back later. PT to f/u
[id number] Pt encountered sitting EOB with ABRAHAM Magallanes assistance with +bed alarm in NAD. Pt agreeable for b/s PT evaluation.

## 2021-09-27 NOTE — DISCHARGE NOTE NURSING/CASE MANAGEMENT/SOCIAL WORK - PATIENT PORTAL LINK FT
You can access the FollowMyHealth Patient Portal offered by [facility] by registering at the following website: [email or web address]. By joining [facility]’s FollowMyHealth portal, you will also be able to view your health information using other applications (apps) compatible with our system.

## 2021-09-27 NOTE — PHYSICAL THERAPY INITIAL EVALUATION ADULT - GAIT DEVIATIONS NOTED, PT EVAL
Pt exhibited significant unsteadiness and multiple episodes of LOB./increased time in double stance/decreased step length/decreased stride length/decreased weight-shifting ability Pt presents with decreased balance and frequent posterior loss of balance, requiring PT assist for safety. Decreased speed, decreased control of RW./increased time in double stance/decreased step length/decreased stride length/decreased weight-shifting ability

## 2021-09-27 NOTE — DISCHARGE NOTE NURSING/CASE MANAGEMENT/SOCIAL WORK - NSDCVIVACCINE_GEN_ALL_CORE_FT
No Vaccines Administered. influenza, injectable, quadrivalent, preservative free; 30-Sep-2021 15:44; Carol Dewitt (ABRAHAM); [employer]; [id number] (Exp. Date: 30-Jun-2022); IntraMuscular; Deltoid Left.; 0.5 milliLiter(s); VIS (VIS Published: 15-Aug-2019, VIS Presented: 30-Sep-2021);

## 2021-09-28 LAB
COVID-19 SPIKE DOMAIN AB INTERP: POSITIVE
COVID-19 SPIKE DOMAIN ANTIBODY RESULT: 160 U/ML — HIGH
GLUCOSE BLDC GLUCOMTR-MCNC: 116 MG/DL — HIGH (ref 70–99)
GLUCOSE BLDC GLUCOMTR-MCNC: 150 MG/DL — HIGH (ref 70–99)
GLUCOSE BLDC GLUCOMTR-MCNC: 161 MG/DL — HIGH (ref 70–99)
SARS-COV-2 IGG+IGM SERPL QL IA: 160 U/ML — HIGH
SARS-COV-2 IGG+IGM SERPL QL IA: POSITIVE

## 2021-09-28 PROCEDURE — 99233 SBSQ HOSP IP/OBS HIGH 50: CPT

## 2021-09-28 RX ORDER — POTASSIUM CHLORIDE 20 MEQ
20 PACKET (EA) ORAL
Refills: 0 | Status: COMPLETED | OUTPATIENT
Start: 2021-09-28 | End: 2021-09-28

## 2021-09-28 RX ADMIN — Medication 1 MILLIGRAM(S): at 13:23

## 2021-09-28 RX ADMIN — ATORVASTATIN CALCIUM 10 MILLIGRAM(S): 80 TABLET, FILM COATED ORAL at 13:23

## 2021-09-28 RX ADMIN — Medication 81 MILLIGRAM(S): at 13:23

## 2021-09-28 RX ADMIN — Medication 20 MILLIEQUIVALENT(S): at 13:23

## 2021-09-28 RX ADMIN — Medication 20 MILLIEQUIVALENT(S): at 16:49

## 2021-09-28 RX ADMIN — GABAPENTIN 300 MILLIGRAM(S): 400 CAPSULE ORAL at 13:23

## 2021-09-28 RX ADMIN — GABAPENTIN 300 MILLIGRAM(S): 400 CAPSULE ORAL at 21:08

## 2021-09-28 RX ADMIN — ENOXAPARIN SODIUM 40 MILLIGRAM(S): 100 INJECTION SUBCUTANEOUS at 21:07

## 2021-09-28 RX ADMIN — GABAPENTIN 300 MILLIGRAM(S): 400 CAPSULE ORAL at 06:10

## 2021-09-28 RX ADMIN — Medication 150 MICROGRAM(S): at 06:10

## 2021-09-28 NOTE — DISCHARGE NOTE PROVIDER - HOSPITAL COURSE
89 yr F with HTN, DM, hypothyroidism, osteoarthritis, questionable SLE was brought ER for evaluation of a fall,   Pt states that she loss her balance while she was getting up from the bathroom, so she felt on the ground and was not able to get up, she called her neice and came to help her,   no syncope or LOC, seizure, fever, chills, body pain, sick contact, legs swelling, SOB, cough or bloody urine.in ER: , VS wnl, lactate 3, received IVF.    fall with mild rhambdo--> resolved , CK trend:          <--228, <--534.  pt will be discharged home with home services.        89 yr F with HTN, DM, hypothyroidism, osteoarthritis, questionable SLE was brought ER for evaluation of a fall,   Pt states that she loss her balance while she was getting up from the bathroom, so she felt on the ground and was not able to get up, she called her neice and came to help her,   no syncope or LOC, seizure, fever, chills, body pain, sick contact, legs swelling, SOB, cough or bloody urine.in ER: , VS wnl, lactate 3, received IVF.    fall with mild rhambdo--> resolved , CK trend:          <--228, <--534.  pt will be discharged home with home services.     Vital Signs Last 24 Hrs  T(C): 36.2 (30 Sep 2021 13:25), Max: 37.1 (29 Sep 2021 20:52)  T(F): 97.1 (30 Sep 2021 13:25), Max: 98.7 (29 Sep 2021 20:52)  HR: 71 (30 Sep 2021 14:40) (68 - 73)  BP: 146/60 (30 Sep 2021 14:40) (128/60 - 172/72)  BP(mean): --  RR: 18 (30 Sep 2021 13:25) (18 - 18)  SpO2: --

## 2021-09-28 NOTE — DISCHARGE NOTE PROVIDER - CARE PROVIDER_API CALL
Gus Reza)  Internal Medicine  [address] [place] [address], [address] [place] [address]  Phone: [phone]  Fax: [phone]  Follow Up Time: 1 week

## 2021-09-28 NOTE — DISCHARGE NOTE PROVIDER - NSDCCPCAREPLAN_GEN_ALL_CORE_FT
PRINCIPAL DISCHARGE DIAGNOSIS  Diagnosis: Rhabdomyolysis  Assessment and Plan of Treatment: Fall prevention includes ways to make your home and other areas safer. It also includes ways you can move more carefully to prevent a fall. Health conditions that cause changes in your blood pressure, vision, or muscle strength and coordination may increase your risk for falls. Medicines may also increase your risk for falls if they make you dizzy, weak, or sleepy.  SEEK IMMEDIATE MEDICAL ATTENTION IF: You have fallen and are unconscious, you have fallen and cannot move part of your body, or you have fallen and have pain or a headache.  FALL PREVENTION TIPS:  Stand or sit up slowly. Use assistive devices as directed. You may need to have grab bars put in your bathroom near the toilet or in the shower.   Wear shoes that fit well and have soles that . Wear shoes both inside and outside. Do not wear shoes with high heels.   Wear a personal alarm that can call 911 in an emergency.   Manage your medical conditions. Keep all appointments with your healthcare providers. Visit your eye doctor as directed.  HOME SAFETY TIPS:  Put nonslip strips on your bath or shower floor to prevent you from  Use a shower seat so you do not need to stand while you shower. Sit on the toilet or a chair in your bathroom to dry yourself and put on clothing. This will prevent you from losing your balance from drying or dressing yourself while you are standing.   Keep paths clear. Remove books, shoes, and other objects from walkways and stairs. Place cords for telephones and lamps out of the way so that you do not need to walk over them. Tape them down if you cannot move them. Remove small rugs or secure it with double-sided tape to prevent you from   Install bright lights in your home. Use night lights to help light paths to the bathroom or kitchen. Always turn on the light before you start walking.   Keep items you use often on shelves within reach. Do not use a step stool to help you reach an item.   Place reflective tape on the edges of your stairs to see better.        SECONDARY DISCHARGE DIAGNOSES  Diagnosis: Impaired ambulation  Assessment and Plan of Treatment:

## 2021-09-28 NOTE — PROGRESS NOTE ADULT - ASSESSMENT
HPI:  89 yr F with HTN, DM, hypothyroidism, osteoarthritis, questionable SLE was brought ER for evaluation of a fall,     Pt states that she loss her balance while she was getting up from the bathroom, so she felt on the ground and was not able to get up, she called her neice and came to help her,   no syncope or LOC, seizure, fever, chills, body pain, sick contact, legs swelling, SOB, cough or bloody urine.     in ER: , VS wnl, lactate 3, received IVF, admitted for disposition  (26 Sep 2021 15:34)    #Mechanical fall   pt     #Hypothyroidism   synthroid     #DM   CAPILLARY BLOOD GLUCOSE  POCT Blood Glucose.: 161 mg/dL (28 Sep 2021 11:05)  POCT Blood Glucose.: 116 mg/dL (28 Sep 2021 07:14)  POCT Blood Glucose.: 239 mg/dL (27 Sep 2021 21:24)  POCT Blood Glucose.: 152 mg/dL (27 Sep 2021 16:15)  controlled     #Overweight BMI 29 patient needs to see dieitian outpatient for further evaluation     #Ventral hernia no intervention     #Renal cyst no intervention     #Hyponatremia no intervention     #Hypokalemia  recurred , replete and check in am     PROGRESS NOTE HANDOFF    Pending:  dc planning     Family discussion: family aware of plan of care     Disposition: SNF

## 2021-09-28 NOTE — DISCHARGE NOTE PROVIDER - NSDCMRMEDTOKEN_GEN_ALL_CORE_FT
Aspir 81 oral delayed release tablet: 1 tab(s) orally once a day  atorvastatin 10 mg oral tablet: 1 tab(s) orally once a day  folic acid 1 mg oral tablet: 1 tab(s) orally once a day  gabapentin 300 mg oral capsule: 1 cap(s) orally 3 times a day  Synthroid 150 mcg (0.15 mg) oral tablet: 1 tab(s) orally once a day

## 2021-09-29 LAB — SARS-COV-2 RNA SPEC QL NAA+PROBE: SIGNIFICANT CHANGE UP

## 2021-09-29 PROCEDURE — 99232 SBSQ HOSP IP/OBS MODERATE 35: CPT

## 2021-09-29 RX ADMIN — GABAPENTIN 300 MILLIGRAM(S): 400 CAPSULE ORAL at 13:17

## 2021-09-29 RX ADMIN — Medication 81 MILLIGRAM(S): at 13:17

## 2021-09-29 RX ADMIN — ENOXAPARIN SODIUM 40 MILLIGRAM(S): 100 INJECTION SUBCUTANEOUS at 21:30

## 2021-09-29 RX ADMIN — GABAPENTIN 300 MILLIGRAM(S): 400 CAPSULE ORAL at 06:19

## 2021-09-29 RX ADMIN — Medication 150 MICROGRAM(S): at 06:19

## 2021-09-29 RX ADMIN — ATORVASTATIN CALCIUM 10 MILLIGRAM(S): 80 TABLET, FILM COATED ORAL at 12:12

## 2021-09-29 RX ADMIN — Medication 1 MILLIGRAM(S): at 12:12

## 2021-09-29 RX ADMIN — GABAPENTIN 300 MILLIGRAM(S): 400 CAPSULE ORAL at 21:30

## 2021-09-30 LAB
GLUCOSE BLDC GLUCOMTR-MCNC: 112 MG/DL — HIGH (ref 70–99)
GLUCOSE BLDC GLUCOMTR-MCNC: 128 MG/DL — HIGH (ref 70–99)
GLUCOSE BLDC GLUCOMTR-MCNC: 163 MG/DL — HIGH (ref 70–99)

## 2021-09-30 PROCEDURE — 99239 HOSP IP/OBS DSCHRG MGMT >30: CPT

## 2021-09-30 RX ORDER — IBUPROFEN 200 MG
200 TABLET ORAL ONCE
Refills: 0 | Status: COMPLETED | OUTPATIENT
Start: 2021-09-30 | End: 2021-09-30

## 2021-09-30 RX ADMIN — Medication 1 MILLIGRAM(S): at 11:47

## 2021-09-30 RX ADMIN — Medication 20 MILLIEQUIVALENT(S): at 11:48

## 2021-09-30 RX ADMIN — GABAPENTIN 300 MILLIGRAM(S): 400 CAPSULE ORAL at 05:28

## 2021-09-30 RX ADMIN — INFLUENZA VIRUS VACCINE 0.5 MILLILITER(S): 15; 15; 15; 15 SUSPENSION INTRAMUSCULAR at 15:44

## 2021-09-30 RX ADMIN — Medication 150 MICROGRAM(S): at 05:28

## 2021-09-30 RX ADMIN — Medication 200 MILLIGRAM(S): at 15:58

## 2021-09-30 RX ADMIN — GABAPENTIN 300 MILLIGRAM(S): 400 CAPSULE ORAL at 14:48

## 2021-09-30 RX ADMIN — Medication 81 MILLIGRAM(S): at 11:47

## 2021-09-30 RX ADMIN — Medication 200 MILLIGRAM(S): at 09:11

## 2021-09-30 RX ADMIN — ATORVASTATIN CALCIUM 10 MILLIGRAM(S): 80 TABLET, FILM COATED ORAL at 11:47

## 2021-09-30 NOTE — PROGRESS NOTE ADULT - SUBJECTIVE AND OBJECTIVE BOX
SUBJECTIVE  Patient is a 89y old Female who presents with a chief complaint of weakness (27 Sep 2021 10:07)  Currently admitted to medicine with the primary diagnosis of Rhabdomyolysis    Today is hospital day 1d, and this morning she is feeling better and reports no overnight events.         OBJECTIVE  PAST MEDICAL & SURGICAL HISTORY  DM (diabetes mellitus)    Osteoarthritis    Hypothyroidism    History of hysterectomy    History of cholecystectomy    History of lumpectomy      ALLERGIES:  No Known Allergies    MEDICATIONS:  STANDING MEDICATIONS  aspirin enteric coated 81 milliGRAM(s) Oral daily  atorvastatin Oral Tab/Cap - Peds 10 milliGRAM(s) Oral daily  enoxaparin Injectable 40 milliGRAM(s) SubCutaneous at bedtime  folic acid 1 milliGRAM(s) Oral daily  gabapentin 300 milliGRAM(s) Oral three times a day  influenza   Vaccine 0.5 milliLiter(s) IntraMuscular once  levothyroxine 150 MICROGram(s) Oral daily    PRN MEDICATIONS      VITAL SIGNS: Last 24 Hours  T(C): 36 (27 Sep 2021 06:16), Max: 37.2 (27 Sep 2021 01:00)  T(F): 96.8 (27 Sep 2021 06:16), Max: 98.9 (27 Sep 2021 01:00)  HR: 61 (27 Sep 2021 06:16) (59 - 87)  BP: 116/57 (27 Sep 2021 06:16) (116/57 - 152/67)  BP(mean): --  RR: 18 (27 Sep 2021 06:16) (18 - 18)  SpO2: 95% (27 Sep 2021 01:00) (95% - 96%)    LABS:                        14.5   8.54  )-----------( 157      ( 27 Sep 2021 04:30 )             42.7         132<L>  |  94<L>  |  12  ----------------------------<  127<H>  3.4<L>   |  22  |  0.7    Ca    8.9      27 Sep 2021 04:30  Mg     1.5         TPro  5.7<L>  /  Alb  3.4<L>  /  TBili  0.9  /  DBili  x   /  AST  32  /  ALT  20  /  AlkPhos  96      PT/INR - ( 26 Sep 2021 03:01 )   PT: 11.70 sec;   INR: 1.02 ratio         PTT - ( 26 Sep 2021 03:01 )  PTT:20.5 sec  Urinalysis Basic - ( 26 Sep 2021 02:46 )    Color: Light Yellow / Appearance: Clear / S.013 / pH: x  Gluc: x / Ketone: Negative  / Bili: Negative / Urobili: <2 mg/dL   Blood: x / Protein: 30 mg/dL / Nitrite: Negative   Leuk Esterase: Negative / RBC: 1 /HPF / WBC 3 /HPF   Sq Epi: x / Non Sq Epi: 1 /HPF / Bacteria: Negative        Lactate, Blood: 1.3 mmol/L (21 @ 04:30)  Creatine Kinase, Serum: 228 U/L *H* (21 @ 04:30)  Lactate, Blood: 2.1 mmol/L *H* (21 @ 20:36)      CARDIAC MARKERS ( 27 Sep 2021 04:30 )  x     / x     / 228 U/L / x     / x      CARDIAC MARKERS ( 26 Sep 2021 03:01 )  x     / x     / 534 U/L / x     / x          RADIOLOGY:      PHYSICAL EXAM:    GENERAL: NAD, well-developed, AAOx3  HEENT:  Atraumatic, Normocephalic. EOMI, PERRLA, conjunctiva and sclera clear, No JVD  PULMONARY: Clear to auscultation bilaterally; No wheeze  CARDIOVASCULAR: Regular rate and rhythm; No murmurs, rubs, or gallops  GASTROINTESTINAL: Soft, Nontender, Nondistended; Bowel sounds present  MUSCULOSKELETAL:  2+ Peripheral Pulses, No clubbing, cyanosis, or edema  NEUROLOGY: non-focal  SKIN: bruise, No rashes or lesions      ADMISSION SUMMARY  89 yr F with HTN, DM, hypothyroidism, osteoarthritis, questionable SLE was brought ER for evaluation of a fall,    #fall with mild rhambdo--> resolved  - fall likely from imbalance  - had IVF at ER, refused to have more  -CK trend:          <--228, <--534  - Lactate, Blood: 1.3 mmol/L ( @ 04:30)  Lactate, Blood: 2.1 mmol/L ( @ 20:36)  Lactate, Blood: 3.0 mmol/L ( @ 03:01)  - PT eval  - pt lives home, doent want to go to NH, she is asking if she can get home aid  -  consult  - pt has multiple bruises at her upper ext, old at the right side, very new at the left side, pt reports she bruises very easily since long time,     #hypothyroidism   - c/ w synthroid    #leukocytosis: pt completed 12 days of prednisone 10mg from her PCP for skin rash, exam non focal    #Questionable SLE not on any medications, not on acute flare up  #suspected folate def: on folic acid  #HTN: controlled with no meds  #DM: keep -180, insulin if needed  #Diet: DASH  #DVT pro: lovenox  #GI pro: not indicated  #Dispo: needs  eval for home aid VS NH
SUBJECTIVE  Patient is a 89y old Female who presents with a chief complaint of weakness (28 Sep 2021 13:04)  Currently admitted to medicine with the primary diagnosis of Rhabdomyolysis    Today is hospital day 3d, and this morning she is stable,ready for discharge and reports no overnight events.       OBJECTIVE  PAST MEDICAL & SURGICAL HISTORY  DM (diabetes mellitus)    Osteoarthritis    Hypothyroidism    History of hysterectomy    History of cholecystectomy    History of lumpectomy      ALLERGIES:  No Known Allergies    MEDICATIONS:  STANDING MEDICATIONS  aspirin enteric coated 81 milliGRAM(s) Oral daily  atorvastatin Oral Tab/Cap - Peds 10 milliGRAM(s) Oral daily  enoxaparin Injectable 40 milliGRAM(s) SubCutaneous at bedtime  folic acid 1 milliGRAM(s) Oral daily  gabapentin 300 milliGRAM(s) Oral three times a day  influenza   Vaccine 0.5 milliLiter(s) IntraMuscular once  levothyroxine 150 MICROGram(s) Oral daily  potassium chloride    Tablet ER 20 milliEquivalent(s) Oral every 2 hours    PRN MEDICATIONS      VITAL SIGNS: Last 24 Hours  T(C): 36.4 (29 Sep 2021 05:04), Max: 37.5 (28 Sep 2021 12:53)  T(F): 97.6 (29 Sep 2021 05:04), Max: 99.5 (28 Sep 2021 12:53)  HR: 65 (29 Sep 2021 05:04) (65 - 81)  BP: 157/91 (29 Sep 2021 05:04) (110/53 - 157/91)  BP(mean): --  RR: 18 (29 Sep 2021 05:04) (18 - 19)  SpO2: 97% (28 Sep 2021 20:58) (97% - 97%)    LABS:  RADIOLOGY:      PHYSICAL EXAM:    GENERAL: NAD, well-developed, AAOx3  HEENT:  Atraumatic, Normocephalic. EOMI, PERRLA, conjunctiva and sclera clear, No JVD  PULMONARY: Clear to auscultation bilaterally; No wheeze  CARDIOVASCULAR: Regular rate and rhythm; No murmurs, rubs, or gallops  GASTROINTESTINAL: Soft, Nontender, Nondistended; Bowel sounds present  MUSCULOSKELETAL:  2+ Peripheral Pulses, No clubbing, cyanosis, or edema  NEUROLOGY: non-focal  SKIN: No rashes or lesions      ADMISSION SUMMARY  89 yr F with HTN, DM, hypothyroidism, osteoarthritis, questionable SLE was brought ER for evaluation of a fall,    #fall with mild rhambdo--> resolved  - fall likely from imbalance  - had IVF at ER, refused to have more  -CK trend:          <--228, <--534  - Lactate, Blood: 1.3 mmol/L (09-27 @ 04:30)  Lactate, Blood: 2.1 mmol/L (09-26 @ 20:36)  Lactate, Blood: 3.0 mmol/L (09-26 @ 03:01)  - PT eval  - pt lives home, doent want to go to NH, she is asking if she can get home aid  - SW consult  - pt has multiple bruises at her upper ext, old at the right side, very new at the left side, pt reports she bruises very easily since long time,     #hypothyroidism   - c/ w synthroid    #leukocytosis: pt completed 12 days of prednisone 10mg from her PCP for skin rash, exam non focal    #Questionable SLE not on any medications, not on acute flare up  #suspected folate def: on folic acid  #HTN: controlled with no meds  #DM: keep -180, insulin if needed  #Diet: DASH  #DVT pro: lovenox  #GI pro: not indicated  #Dispo NH
SUBJECTIVE  Patient is a 89y old Female who presents with a chief complaint of weakness (29 Sep 2021 12:49)  Currently admitted to medicine with the primary diagnosis of Rhabdomyolysis    Today is hospital day 4d, and this morning she is planned for DC today and reports no overnight events.       OBJECTIVE  PAST MEDICAL & SURGICAL HISTORY  DM (diabetes mellitus)    Osteoarthritis    Hypothyroidism    History of hysterectomy    History of cholecystectomy    History of lumpectomy      ALLERGIES:  No Known Allergies    MEDICATIONS:  STANDING MEDICATIONS  aspirin enteric coated 81 milliGRAM(s) Oral daily  atorvastatin Oral Tab/Cap - Peds 10 milliGRAM(s) Oral daily  enoxaparin Injectable 40 milliGRAM(s) SubCutaneous at bedtime  folic acid 1 milliGRAM(s) Oral daily  gabapentin 300 milliGRAM(s) Oral three times a day  ibuprofen  Tablet. 200 milliGRAM(s) Oral once  influenza   Vaccine 0.5 milliLiter(s) IntraMuscular once  levothyroxine 150 MICROGram(s) Oral daily  potassium chloride    Tablet ER 20 milliEquivalent(s) Oral every 2 hours    PRN MEDICATIONS      VITAL SIGNS: Last 24 Hours  T(C): 36.6 (30 Sep 2021 04:25), Max: 37.1 (29 Sep 2021 20:52)  T(F): 97.8 (30 Sep 2021 04:25), Max: 98.7 (29 Sep 2021 20:52)  HR: 69 (30 Sep 2021 05:28) (68 - 118)  BP: 172/72 (30 Sep 2021 05:28) (128/60 - 172/72)  BP(mean): --  RR: 18 (30 Sep 2021 04:25) (18 - 18)  SpO2: --    LABS:    RADIOLOGY:      PHYSICAL EXAM:    GENERAL: NAD, well-developed, AAOx3  HEENT:  Atraumatic, Normocephalic. EOMI, PERRLA, conjunctiva and sclera clear, No JVD  PULMONARY: Clear to auscultation bilaterally; No wheeze  CARDIOVASCULAR: Regular rate and rhythm; No murmurs, rubs, or gallops  GASTROINTESTINAL: Soft, Nontender, Nondistended; Bowel sounds present  MUSCULOSKELETAL:  2+ Peripheral Pulses, No clubbing, cyanosis, or edema  NEUROLOGY: non-focal  SKIN: No rashes or lesions      ADMISSION SUMMARY  89 yr F with HTN, DM, hypothyroidism, osteoarthritis, questionable SLE was brought ER for evaluation of a fall,    #fall with mild rhambdo--> resolved  - fall likely from imbalance  - had IVF at ER, refused to have more  -CK trend:          <--228, <--534  - Lactate, Blood: 1.3 mmol/L (09-27 @ 04:30)  Lactate, Blood: 2.1 mmol/L (09-26 @ 20:36)  Lactate, Blood: 3.0 mmol/L (09-26 @ 03:01)  - PT eval  - pt lives home, doent want to go to NH, she is asking if she can get home aid  - SW consult  - pt has multiple bruises at her upper ext, old at the right side, very new at the left side, pt reports she bruises very easily since long time,     #hypothyroidism   - c/ w synthroid    #leukocytosis: pt completed 12 days of prednisone 10mg from her PCP for skin rash, exam non focal    #Questionable SLE not on any medications, not on acute flare up  #suspected folate def: on folic acid  #HTN: controlled with no meds  #DM: keep -180, insulin if needed  #Diet: DASH  #DVT pro: lovenox  #GI pro: not indicated  #Dispo NH
  MELANIA TRISTAN  89y  [facility] [address]      Patient is a 89y old  Female who presents with a chief complaint of weakness (28 Sep 2021 09:52)      INTERVAL HPI/OVERNIGHT EVENTS:      no acute events overnight   REVIEW OF SYSTEMS:  CONSTITUTIONAL: No fever, weight loss, or fatigue  EYES: No eye pain, visual disturbances, or discharge  ENMT:  No difficulty hearing, tinnitus, vertigo; No sinus or throat pain  NECK: No pain or stiffness  BREASTS: No pain, masses, or nipple discharge  RESPIRATORY: No cough, wheezing, chills or hemoptysis; No shortness of breath  CARDIOVASCULAR: No chest pain, palpitations, dizziness, or leg swelling  GASTROINTESTINAL: No abdominal or epigastric pain. No nausea, vomiting, or hematemesis; No diarrhea or constipation. No melena or hematochezia.  GENITOURINARY: No dysuria, frequency, hematuria, or incontinence  NEUROLOGICAL: No headaches, memory loss, loss of strength, numbness, or tremors  SKIN: No itching, burning, rashes, or lesions   LYMPH NODES: No enlarged glands  ENDOCRINE: No heat or cold intolerance; No hair loss  MUSCULOSKELETAL: No joint pain or swelling; No muscle, back, or extremity pain  PSYCHIATRIC: No depression, anxiety, mood swings, or difficulty sleeping  HEME/LYMPH: No easy bruising, or bleeding gums  ALLERY AND IMMUNOLOGIC: No hives or eczema  FAMILY HISTORY:  No pertinent family history in first degree relatives      T(C): 37.1 (09-28-21 @ 06:16), Max: 37.1 (09-27-21 @ 13:07)  HR: 65 (09-28-21 @ 06:16) (65 - 79)  BP: 136/73 (09-28-21 @ 06:16) (136/64 - 175/72)  RR: 18 (09-27-21 @ 21:27) (18 - 18)  SpO2: 96% (09-27-21 @ 20:00) (96% - 96%)  Wt(kg): --Vital Signs Last 24 Hrs  T(C): 37.1 (28 Sep 2021 06:16), Max: 37.1 (27 Sep 2021 13:07)  T(F): 98.7 (28 Sep 2021 06:16), Max: 98.7 (27 Sep 2021 13:07)  HR: 65 (28 Sep 2021 06:16) (65 - 79)  BP: 136/73 (28 Sep 2021 06:16) (136/64 - 175/72)  BP(mean): 88 (27 Sep 2021 21:42) (88 - 88)  RR: 18 (27 Sep 2021 21:27) (18 - 18)  SpO2: 96% (27 Sep 2021 20:00) (96% - 96%)    PHYSICAL EXAM:  GENERAL: NAD, well-groomed, well-developed  HEAD:  Atraumatic, Normocephalic  EYES: EOMI, PERRLA, conjunctiva and sclera clear  ENMT: No tonsillar erythema, exudates, or enlargement; Moist mucous membranes, Good dentition, No lesions  NECK: Supple, No JVD, Normal thyroid  NERVOUS SYSTEM:  Alert & Oriented X3, Good concentration;  PULM: Clear to auscultation bilaterally  CARDIAC: Regular rate and rhythm; No murmurs, rubs, or gallops  GI: Soft, Nontender, Nondistended; Bowel sounds present  EXTREMITIES:  2+ Peripheral Pulses, No clubbing, cyanosis, or edema  LYMPH: No lymphadenopathy noted  SKIN: No rashes or lesions    Consultant(s) Notes Reviewed:  [x ] YES  [ ] NO  Care Discussed with Consultants/Other Providers [ x] YES  [ ] NO    LABS:                            14.5   8.54  )-----------( 157      ( 27 Sep 2021 04:30 )             42.7   09-27    132<L>  |  94<L>  |  12  ----------------------------<  127<H>  3.4<L>   |  22  |  0.7    Ca    8.9      27 Sep 2021 04:30  Mg     1.5     09-27    TPro  5.7<L>  /  Alb  3.4<L>  /  TBili  0.9  /  DBili  x   /  AST  32  /  ALT  20  /  AlkPhos  96  09-27            aspirin enteric coated 81 milliGRAM(s) Oral daily  atorvastatin Oral Tab/Cap - Peds 10 milliGRAM(s) Oral daily  enoxaparin Injectable 40 milliGRAM(s) SubCutaneous at bedtime  folic acid 1 milliGRAM(s) Oral daily  gabapentin 300 milliGRAM(s) Oral three times a day  influenza   Vaccine 0.5 milliLiter(s) IntraMuscular once  levothyroxine 150 MICROGram(s) Oral daily      HEALTH ISSUES - PROBLEM Dx:          Case Discussed with House Staff   41 minutes spent on total encounter; more than 50% of the visit was spent counseling and/or coordinating care by the attending physician.   [facility] [phone]  
  MELANIA TRISTAN  89y  [facility] [address]      Patient is a 89y old  Female who presents with a chief complaint of weakness (27 Sep 2021 10:11)      INTERVAL HPI/OVERNIGHT EVENTS:     no acute events overnight     REVIEW OF SYSTEMS:  ROS neg   FAMILY HISTORY:  No pertinent family history in first degree relatives      T(C): 37.1 (09-27-21 @ 13:07), Max: 37.2 (09-27-21 @ 01:00)  HR: 69 (09-27-21 @ 13:07) (60 - 87)  BP: 144/65 (09-27-21 @ 13:07) (116/57 - 152/67)  RR: 18 (09-27-21 @ 13:07) (18 - 18)  SpO2: 95% (09-27-21 @ 01:00) (95% - 96%)  Wt(kg): --Vital Signs Last 24 Hrs  T(C): 37.1 (27 Sep 2021 13:07), Max: 37.2 (27 Sep 2021 01:00)  T(F): 98.7 (27 Sep 2021 13:07), Max: 98.9 (27 Sep 2021 01:00)  HR: 69 (27 Sep 2021 13:07) (60 - 87)  BP: 144/65 (27 Sep 2021 13:07) (116/57 - 152/67)  BP(mean): --  RR: 18 (27 Sep 2021 13:07) (18 - 18)  SpO2: 95% (27 Sep 2021 01:00) (95% - 96%)    PHYSICAL EXAM:  GENERAL: NAD, well-groomed, well-developed  HEAD:  Atraumatic, Normocephalic  EYES: EOMI, PERRLA, conjunctiva and sclera clear  ENMT: No tonsillar erythema, exudates, or enlargement; Moist mucous membranes, Good dentition, No lesions  NECK: Supple, No JVD, Normal thyroid  PULM: Clear to auscultation bilaterally  CARDIAC: Regular rate and rhythm; No murmurs, rubs, or gallops  GI: Soft, Nontender, Nondistended; Bowel sounds present  EXTREMITIES:  2+ Peripheral Pulses, No clubbing, cyanosis, or edema  LYMPH: No lymphadenopathy noted  SKIN: No rashes or lesions    Consultant(s) Notes Reviewed:  [x ] YES  [ ] NO  Care Discussed with Consultants/Other Providers [ x] YES  [ ] NO    LABS:                            14.5   8.54  )-----------( 157      ( 27 Sep 2021 04:30 )             42.7   09-27    132<L>  |  94<L>  |  12  ----------------------------<  127<H>  3.4<L>   |  22  |  0.7    Ca    8.9      27 Sep 2021 04:30  Mg     1.5     09-27    TPro  5.7<L>  /  Alb  3.4<L>  /  TBili  0.9  /  DBili  x   /  AST  32  /  ALT  20  /  AlkPhos  96  09-27            aspirin enteric coated 81 milliGRAM(s) Oral daily  atorvastatin Oral Tab/Cap - Peds 10 milliGRAM(s) Oral daily  enoxaparin Injectable 40 milliGRAM(s) SubCutaneous at bedtime  folic acid 1 milliGRAM(s) Oral daily  gabapentin 300 milliGRAM(s) Oral three times a day  influenza   Vaccine 0.5 milliLiter(s) IntraMuscular once  levothyroxine 150 MICROGram(s) Oral daily  loratadine 10 milliGRAM(s) Oral once      HEALTH ISSUES - PROBLEM Dx:          Case Discussed with House Staff   [employer] [phone]

## 2021-09-30 NOTE — PROGRESS NOTE ADULT - ATTENDING COMMENTS
89 yr F with HTN, DM, hypothyroidism, osteoarthritis, questionable SLE was brought ER for evaluation of a fall,    #fall with mild rhambdo--> resolved  - fall likely from imbalance  - had IVF at ER, refused to have more  -CK trend:          <--228, <--534  - Lactate, Blood: 1.3 mmol/L (09-27 @ 04:30)  Lactate, Blood: 2.1 mmol/L (09-26 @ 20:36)  Lactate, Blood: 3.0 mmol/L (09-26 @ 03:01)  - PT eval  - pt lives home, doent want to go to NH, she is asking if she can get home aid  - SW consult  - pt has multiple bruises at her upper ext, old at the right side, very new at the left side, pt reports she bruises very easily since long time,     #hypothyroidism   - c/ w synthroid    #leukocytosis: pt completed 12 days of prednisone 10mg from her PCP for skin rash, exam non focal    #Questionable SLE not on any medications, not on acute flare up  #suspected folate def: on folic acid  #HTN: controlled with no meds  #DM: keep -180, insulin if needed  #Diet: DASH  #DVT pro: lovenox  #GI pro: not indicated  #Dispo NH    #Progress Note Handoff  Pending (specify):  n/a  Disposition: STR at [facility], transport via ambulette.
89 yr F with HTN, DM, hypothyroidism, osteoarthritis, questionable SLE was brought ER for evaluation of a fall,    #fall with mild rhambdo--> resolved  - fall likely from imbalance  - had IVF at ER, refused to have more  -CK trend:          <--228, <--534  - Lactate, Blood: 1.3 mmol/L (09-27 @ 04:30)  Lactate, Blood: 2.1 mmol/L (09-26 @ 20:36)  Lactate, Blood: 3.0 mmol/L (09-26 @ 03:01)  - PT eval  - pt lives home, doent want to go to NH, she is asking if she can get home aid  - SW consult  - pt has multiple bruises at her upper ext, old at the right side, very new at the left side, pt reports she bruises very easily since long time,     #hypothyroidism   - c/ w synthroid    #leukocytosis: pt completed 12 days of prednisone 10mg from her PCP for skin rash, exam non focal    #Questionable SLE not on any medications, not on acute flare up  #suspected folate def: on folic acid  #HTN: controlled with no meds  #DM: keep -180, insulin if needed  #Diet: DASH  #DVT pro: lovenox  #GI pro: not indicated  #Dispo NH    #Progress Note Handoff  Pending (specify):  n/a  Disposition: STR at [facility], transport via ambulette.

## 2021-10-01 VITALS
RESPIRATION RATE: 18 BRPM | HEART RATE: 68 BPM | SYSTOLIC BLOOD PRESSURE: 123 MMHG | DIASTOLIC BLOOD PRESSURE: 59 MMHG | TEMPERATURE: 96 F

## 2021-10-07 NOTE — CDI QUERY NOTE - NSCDIOTHERTXTBX_GEN_ALL_CORE_HH
CLINICAL INDICATORS:  9/26 H&P Adult: 89 yr F with HTN, DM, hypothyroidism, osteoarthritis, questionable SLE was brought ER for evaluation of a fall...  in ER: , VS wnl, lactate 3, received IVF...fall with mild rhambdo… fall likely from imbalance    9/26 H&P: fall with mild rhambdo    9/28 D/C Note Provider: fall with mild rhambdo--> resolved , CK trend: <--228, <--534.      Based on your clinical judgment and the clinical indicators, please clarify if the rhabdomylois can further be specified as:    * Traumatic rhabdomyolosis associated with the fall  * Rhabdomyolosis not associated with the fall  * Other (please specify):  * Clinically unable to determine        Thank you,    TAmanda Rodriguez RN, BSN, CCDS  [phone]

## 2021-11-19 ENCOUNTER — INPATIENT (INPATIENT)
Facility: HOSPITAL | Age: 86
LOS: 12 days | Discharge: NURSING, REHAB OR LONG-TERM CARE | End: 2021-12-02
Attending: INTERNAL MEDICINE | Admitting: INTERNAL MEDICINE
Payer: MEDICARE

## 2021-11-19 VITALS
TEMPERATURE: 98 F | OXYGEN SATURATION: 94 % | RESPIRATION RATE: 18 BRPM | SYSTOLIC BLOOD PRESSURE: 140 MMHG | DIASTOLIC BLOOD PRESSURE: 82 MMHG | HEART RATE: 64 BPM | WEIGHT: 179.9 LBS | HEIGHT: 65 IN

## 2021-11-19 DIAGNOSIS — N17.9 ACUTE KIDNEY FAILURE, UNSPECIFIED: ICD-10-CM

## 2021-11-19 DIAGNOSIS — E03.9 HYPOTHYROIDISM, UNSPECIFIED: ICD-10-CM

## 2021-11-19 DIAGNOSIS — R33.9 RETENTION OF URINE, UNSPECIFIED: ICD-10-CM

## 2021-11-19 DIAGNOSIS — M32.9 SYSTEMIC LUPUS ERYTHEMATOSUS, UNSPECIFIED: ICD-10-CM

## 2021-11-19 DIAGNOSIS — I48.0 PAROXYSMAL ATRIAL FIBRILLATION: ICD-10-CM

## 2021-11-19 DIAGNOSIS — D72.829 ELEVATED WHITE BLOOD CELL COUNT, UNSPECIFIED: ICD-10-CM

## 2021-11-19 DIAGNOSIS — Z98.890 OTHER SPECIFIED POSTPROCEDURAL STATES: Chronic | ICD-10-CM
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
## 2021-11-19 DIAGNOSIS — E78.5 HYPERLIPIDEMIA, UNSPECIFIED: ICD-10-CM

## 2021-11-19 DIAGNOSIS — S72.142A DISPLACED INTERTROCHANTERIC FRACTURE OF LEFT FEMUR, INITIAL ENCOUNTER FOR CLOSED FRACTURE: ICD-10-CM

## 2021-11-19 DIAGNOSIS — Z90.710 ACQUIRED ABSENCE OF BOTH CERVIX AND UTERUS: Chronic | ICD-10-CM
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
## 2021-11-19 DIAGNOSIS — R91.1 SOLITARY PULMONARY NODULE: ICD-10-CM

## 2021-11-19 DIAGNOSIS — Z90.49 ACQUIRED ABSENCE OF OTHER SPECIFIED PARTS OF DIGESTIVE TRACT: Chronic | ICD-10-CM

## 2021-11-19 DIAGNOSIS — E11.40 TYPE 2 DIABETES MELLITUS WITH DIABETIC NEUROPATHY, UNSPECIFIED: ICD-10-CM

## 2021-11-19 LAB
ALBUMIN SERPL ELPH-MCNC: 3.7 G/DL — SIGNIFICANT CHANGE UP (ref 3.5–5.2)
ALBUMIN SERPL ELPH-MCNC: 4 G/DL — SIGNIFICANT CHANGE UP (ref 3.5–5.2)
ALP SERPL-CCNC: 118 U/L — HIGH (ref 30–115)
ALP SERPL-CCNC: 129 U/L — HIGH (ref 30–115)
ALT FLD-CCNC: 10 U/L — SIGNIFICANT CHANGE UP (ref 0–41)
ALT FLD-CCNC: 10 U/L — SIGNIFICANT CHANGE UP (ref 0–41)
ANION GAP SERPL CALC-SCNC: 15 MMOL/L — HIGH (ref 7–14)
ANION GAP SERPL CALC-SCNC: 16 MMOL/L — HIGH (ref 7–14)
APTT BLD: 31.4 SEC — SIGNIFICANT CHANGE UP (ref 27–39.2)
APTT BLD: 37.4 SEC — SIGNIFICANT CHANGE UP (ref 27–39.2)
AST SERPL-CCNC: 17 U/L — SIGNIFICANT CHANGE UP (ref 0–41)
AST SERPL-CCNC: 22 U/L — SIGNIFICANT CHANGE UP (ref 0–41)
BASOPHILS # BLD AUTO: 0.02 K/UL — SIGNIFICANT CHANGE UP (ref 0–0.2)
BASOPHILS NFR BLD AUTO: 0.6 % — SIGNIFICANT CHANGE UP (ref 0–1)
BILIRUB SERPL-MCNC: 0.7 MG/DL — SIGNIFICANT CHANGE UP (ref 0.2–1.2)
BILIRUB SERPL-MCNC: 0.9 MG/DL — SIGNIFICANT CHANGE UP (ref 0.2–1.2)
BLD GP AB SCN SERPL QL: SIGNIFICANT CHANGE UP
BUN SERPL-MCNC: 5 MG/DL — LOW (ref 10–20)
BUN SERPL-MCNC: 6 MG/DL — LOW (ref 10–20)
CALCIUM SERPL-MCNC: 10.1 MG/DL — SIGNIFICANT CHANGE UP (ref 8.5–10.1)
CALCIUM SERPL-MCNC: 9.3 MG/DL — SIGNIFICANT CHANGE UP (ref 8.5–10.1)
CHLORIDE SERPL-SCNC: 101 MMOL/L — SIGNIFICANT CHANGE UP (ref 98–110)
CHLORIDE SERPL-SCNC: 99 MMOL/L — SIGNIFICANT CHANGE UP (ref 98–110)
CO2 SERPL-SCNC: 22 MMOL/L — SIGNIFICANT CHANGE UP (ref 17–32)
CO2 SERPL-SCNC: 23 MMOL/L — SIGNIFICANT CHANGE UP (ref 17–32)
CREAT SERPL-MCNC: 0.7 MG/DL — SIGNIFICANT CHANGE UP (ref 0.7–1.5)
CREAT SERPL-MCNC: 0.9 MG/DL — SIGNIFICANT CHANGE UP (ref 0.7–1.5)
EOSINOPHIL # BLD AUTO: 0.03 K/UL — SIGNIFICANT CHANGE UP (ref 0–0.7)
EOSINOPHIL NFR BLD AUTO: 0.8 % — SIGNIFICANT CHANGE UP (ref 0–8)
ETHANOL SERPL-MCNC: <10 MG/DL — SIGNIFICANT CHANGE UP
GLUCOSE SERPL-MCNC: 113 MG/DL — HIGH (ref 70–99)
GLUCOSE SERPL-MCNC: 143 MG/DL — HIGH (ref 70–99)
HCT VFR BLD CALC: 36.3 % — LOW (ref 37–47)
HCT VFR BLD CALC: 38.7 % — SIGNIFICANT CHANGE UP (ref 37–47)
HGB BLD-MCNC: 12.1 G/DL — SIGNIFICANT CHANGE UP (ref 12–16)
HGB BLD-MCNC: 13.1 G/DL — SIGNIFICANT CHANGE UP (ref 12–16)
IMM GRANULOCYTES NFR BLD AUTO: 0.3 % — SIGNIFICANT CHANGE UP (ref 0.1–0.3)
INR BLD: 1.12 RATIO — SIGNIFICANT CHANGE UP (ref 0.65–1.3)
INR BLD: 1.21 RATIO — SIGNIFICANT CHANGE UP (ref 0.65–1.3)
LACTATE SERPL-SCNC: 1.7 MMOL/L — SIGNIFICANT CHANGE UP (ref 0.7–2)
LIDOCAIN IGE QN: 14 U/L — SIGNIFICANT CHANGE UP (ref 7–60)
LYMPHOCYTES # BLD AUTO: 0.54 K/UL — LOW (ref 1.2–3.4)
LYMPHOCYTES # BLD AUTO: 14.9 % — LOW (ref 20.5–51.1)
MAGNESIUM SERPL-MCNC: 1.4 MG/DL — LOW (ref 1.8–2.4)
MCHC RBC-ENTMCNC: 30.8 PG — SIGNIFICANT CHANGE UP (ref 27–31)
MCHC RBC-ENTMCNC: 31.3 PG — HIGH (ref 27–31)
MCHC RBC-ENTMCNC: 33.3 G/DL — SIGNIFICANT CHANGE UP (ref 32–37)
MCHC RBC-ENTMCNC: 33.9 G/DL — SIGNIFICANT CHANGE UP (ref 32–37)
MCV RBC AUTO: 92.4 FL — SIGNIFICANT CHANGE UP (ref 81–99)
MCV RBC AUTO: 92.6 FL — SIGNIFICANT CHANGE UP (ref 81–99)
MONOCYTES # BLD AUTO: 0.39 K/UL — SIGNIFICANT CHANGE UP (ref 0.1–0.6)
MONOCYTES NFR BLD AUTO: 10.8 % — HIGH (ref 1.7–9.3)
NEUTROPHILS # BLD AUTO: 2.63 K/UL — SIGNIFICANT CHANGE UP (ref 1.4–6.5)
NEUTROPHILS NFR BLD AUTO: 72.6 % — SIGNIFICANT CHANGE UP (ref 42.2–75.2)
NRBC # BLD: 0 /100 WBCS — SIGNIFICANT CHANGE UP (ref 0–0)
NRBC # BLD: 0 /100 WBCS — SIGNIFICANT CHANGE UP (ref 0–0)
PLATELET # BLD AUTO: 168 K/UL — SIGNIFICANT CHANGE UP (ref 130–400)
PLATELET # BLD AUTO: 169 K/UL — SIGNIFICANT CHANGE UP (ref 130–400)
POTASSIUM SERPL-MCNC: 3.3 MMOL/L — LOW (ref 3.5–5)
POTASSIUM SERPL-MCNC: 3.9 MMOL/L — SIGNIFICANT CHANGE UP (ref 3.5–5)
POTASSIUM SERPL-SCNC: 3.3 MMOL/L — LOW (ref 3.5–5)
POTASSIUM SERPL-SCNC: 3.9 MMOL/L — SIGNIFICANT CHANGE UP (ref 3.5–5)
PROT SERPL-MCNC: 6.2 G/DL — SIGNIFICANT CHANGE UP (ref 6–8)
PROT SERPL-MCNC: 6.6 G/DL — SIGNIFICANT CHANGE UP (ref 6–8)
PROTHROM AB SERPL-ACNC: 12.9 SEC — HIGH (ref 9.95–12.87)
PROTHROM AB SERPL-ACNC: 13.9 SEC — HIGH (ref 9.95–12.87)
RBC # BLD: 3.93 M/UL — LOW (ref 4.2–5.4)
RBC # BLD: 4.18 M/UL — LOW (ref 4.2–5.4)
RBC # FLD: 13 % — SIGNIFICANT CHANGE UP (ref 11.5–14.5)
RBC # FLD: 13.1 % — SIGNIFICANT CHANGE UP (ref 11.5–14.5)
SARS-COV-2 RNA SPEC QL NAA+PROBE: SIGNIFICANT CHANGE UP
SODIUM SERPL-SCNC: 138 MMOL/L — SIGNIFICANT CHANGE UP (ref 135–146)
SODIUM SERPL-SCNC: 138 MMOL/L — SIGNIFICANT CHANGE UP (ref 135–146)
WBC # BLD: 3.62 K/UL — LOW (ref 4.8–10.8)
WBC # BLD: 7.12 K/UL — SIGNIFICANT CHANGE UP (ref 4.8–10.8)
WBC # FLD AUTO: 3.62 K/UL — LOW (ref 4.8–10.8)
WBC # FLD AUTO: 7.12 K/UL — SIGNIFICANT CHANGE UP (ref 4.8–10.8)

## 2021-11-19 PROCEDURE — 93306 TTE W/DOPPLER COMPLETE: CPT | Mod: 26

## 2021-11-19 PROCEDURE — 99222 1ST HOSP IP/OBS MODERATE 55: CPT

## 2021-11-19 PROCEDURE — 72125 CT NECK SPINE W/O DYE: CPT | Mod: 26,MA

## 2021-11-19 PROCEDURE — 71250 CT THORAX DX C-: CPT | Mod: 26,MA

## 2021-11-19 PROCEDURE — 73552 X-RAY EXAM OF FEMUR 2/>: CPT | Mod: 26,LT

## 2021-11-19 PROCEDURE — 73562 X-RAY EXAM OF KNEE 3: CPT | Mod: 26,LT

## 2021-11-19 PROCEDURE — 73502 X-RAY EXAM HIP UNI 2-3 VIEWS: CPT | Mod: 26,LT

## 2021-11-19 PROCEDURE — 99223 1ST HOSP IP/OBS HIGH 75: CPT

## 2021-11-19 PROCEDURE — 99285 EMERGENCY DEPT VISIT HI MDM: CPT

## 2021-11-19 PROCEDURE — 73060 X-RAY EXAM OF HUMERUS: CPT | Mod: 26,LT

## 2021-11-19 PROCEDURE — 71045 X-RAY EXAM CHEST 1 VIEW: CPT | Mod: 26

## 2021-11-19 PROCEDURE — 93010 ELECTROCARDIOGRAM REPORT: CPT | Mod: 77

## 2021-11-19 PROCEDURE — 93010 ELECTROCARDIOGRAM REPORT: CPT

## 2021-11-19 PROCEDURE — 70450 CT HEAD/BRAIN W/O DYE: CPT | Mod: 26,MA

## 2021-11-19 PROCEDURE — 74176 CT ABD & PELVIS W/O CONTRAST: CPT | Mod: 26,MA

## 2021-11-19 PROCEDURE — 71045 X-RAY EXAM CHEST 1 VIEW: CPT | Mod: 26,77

## 2021-11-19 RX ORDER — ATORVASTATIN CALCIUM 80 MG/1
10 TABLET, FILM COATED ORAL AT BEDTIME
Refills: 0 | Status: DISCONTINUED | OUTPATIENT
Start: 2021-11-19 | End: 2021-11-20

## 2021-11-19 RX ORDER — SODIUM CHLORIDE 9 MG/ML
1000 INJECTION, SOLUTION INTRAVENOUS
Refills: 0 | Status: COMPLETED | OUTPATIENT
Start: 2021-11-19 | End: 2021-11-19

## 2021-11-19 RX ORDER — HEPARIN SODIUM 5000 [USP'U]/ML
5000 INJECTION INTRAVENOUS; SUBCUTANEOUS ONCE
Refills: 0 | Status: DISCONTINUED | OUTPATIENT
Start: 2021-11-19 | End: 2021-11-19

## 2021-11-19 RX ORDER — METOPROLOL TARTRATE 50 MG
25 TABLET ORAL
Refills: 0 | Status: DISCONTINUED | OUTPATIENT
Start: 2021-11-19 | End: 2021-11-20

## 2021-11-19 RX ORDER — DILTIAZEM HCL 120 MG
10 CAPSULE, EXT RELEASE 24 HR ORAL ONCE
Refills: 0 | Status: COMPLETED | OUTPATIENT
Start: 2021-11-19 | End: 2021-11-19

## 2021-11-19 RX ORDER — MORPHINE SULFATE 50 MG/1
2 CAPSULE, EXTENDED RELEASE ORAL ONCE
Refills: 0 | Status: DISCONTINUED | OUTPATIENT
Start: 2021-11-19 | End: 2021-11-19

## 2021-11-19 RX ORDER — ACETAMINOPHEN 500 MG
975 TABLET ORAL ONCE
Refills: 0 | Status: COMPLETED | OUTPATIENT
Start: 2021-11-19 | End: 2021-11-19

## 2021-11-19 RX ORDER — GABAPENTIN 400 MG/1
300 CAPSULE ORAL THREE TIMES A DAY
Refills: 0 | Status: DISCONTINUED | OUTPATIENT
Start: 2021-11-19 | End: 2021-11-20

## 2021-11-19 RX ORDER — ASPIRIN/CALCIUM CARB/MAGNESIUM 324 MG
81 TABLET ORAL DAILY
Refills: 0 | Status: DISCONTINUED | OUTPATIENT
Start: 2021-11-19 | End: 2021-11-20

## 2021-11-19 RX ORDER — PANTOPRAZOLE SODIUM 20 MG/1
40 TABLET, DELAYED RELEASE ORAL
Refills: 0 | Status: DISCONTINUED | OUTPATIENT
Start: 2021-11-19 | End: 2021-11-20

## 2021-11-19 RX ORDER — LEVOTHYROXINE SODIUM 125 MCG
150 TABLET ORAL DAILY
Refills: 0 | Status: DISCONTINUED | OUTPATIENT
Start: 2021-11-19 | End: 2021-11-20

## 2021-11-19 RX ORDER — CHLORHEXIDINE GLUCONATE 213 G/1000ML
1 SOLUTION TOPICAL
Refills: 0 | Status: DISCONTINUED | OUTPATIENT
Start: 2021-11-19 | End: 2021-11-20

## 2021-11-19 RX ORDER — FOLIC ACID 0.8 MG
1 TABLET ORAL DAILY
Refills: 0 | Status: DISCONTINUED | OUTPATIENT
Start: 2021-11-19 | End: 2021-11-20

## 2021-11-19 RX ORDER — SODIUM CHLORIDE 9 MG/ML
1000 INJECTION, SOLUTION INTRAVENOUS ONCE
Refills: 0 | Status: COMPLETED | OUTPATIENT
Start: 2021-11-19 | End: 2021-11-19

## 2021-11-19 RX ADMIN — GABAPENTIN 300 MILLIGRAM(S): 400 CAPSULE ORAL at 16:12

## 2021-11-19 RX ADMIN — Medication 10 MILLIGRAM(S): at 07:40

## 2021-11-19 RX ADMIN — SODIUM CHLORIDE 100 MILLILITER(S): 9 INJECTION, SOLUTION INTRAVENOUS at 07:40

## 2021-11-19 RX ADMIN — Medication 25 MILLIGRAM(S): at 18:06

## 2021-11-19 RX ADMIN — GABAPENTIN 300 MILLIGRAM(S): 400 CAPSULE ORAL at 21:49

## 2021-11-19 RX ADMIN — Medication 1 MILLIGRAM(S): at 16:12

## 2021-11-19 RX ADMIN — Medication 81 MILLIGRAM(S): at 16:12

## 2021-11-19 RX ADMIN — MORPHINE SULFATE 2 MILLIGRAM(S): 50 CAPSULE, EXTENDED RELEASE ORAL at 06:02

## 2021-11-19 RX ADMIN — MORPHINE SULFATE 2 MILLIGRAM(S): 50 CAPSULE, EXTENDED RELEASE ORAL at 16:16

## 2021-11-19 RX ADMIN — ATORVASTATIN CALCIUM 10 MILLIGRAM(S): 80 TABLET, FILM COATED ORAL at 21:50

## 2021-11-19 RX ADMIN — SODIUM CHLORIDE 1000 MILLILITER(S): 9 INJECTION, SOLUTION INTRAVENOUS at 06:00

## 2021-11-19 NOTE — H&P ADULT - NSICDXPASTMEDICALHX_GEN_ALL_CORE_FT
PAST MEDICAL HISTORY:  DM (diabetes mellitus)     Hyperlipidemia     Hypothyroidism     Lupus     Osteoarthritis

## 2021-11-19 NOTE — ED PROVIDER NOTE - OBJECTIVE STATEMENT
88 yo f with pmh of hypothyroidism, osteoarthritis, dm presenting s/p fall. Patient was walking at home when she tripped and fell, landing on her L hip/L side. States that she was unable to get up after this happened. Denies losing consciousness, not on any blood thinners.

## 2021-11-19 NOTE — ED PROVIDER NOTE - CLINICAL SUMMARY MEDICAL DECISION MAKING FREE TEXT BOX
88 yo F, hx of hypothyroidism, OA, frequent falls here for assessment sp fall at home, landing on L side. Unable to ambulate since. No AC use, unclear if she hit her head. Patient denies pain however on exam has ttp over L hip and extremity is shortened and externally rotated, good pulses.     VS normal, extremity exam as above, otherwise no signs of head trauma, no midline CTL spine ttp, clear lungs, RRR, soft, NT, Nd abdomen, has intetertriginous candidiasis in skin folds of abdomen.     Imaging demonstrates comminuted L intertroch fracture, no other injuries, ortho saw patient, will do ORIF today.     Patient to be admitted for isolated hip fx sp fall

## 2021-11-19 NOTE — H&P ADULT - ATTENDING COMMENTS
89 year old female with PMH of DM 2, hypothyroidism, hyperlipidemia, osteoarthritis and lupus was brought to ED after a fall at home. The patient was walking at home when she tripped and fell, landing on her left hip and side. She states that she was unable to get up after this happened. She denies losing consciousness. She denies chest pain, SOB, fever or cough. In the ED the patient's vitals were T98 /82 HR 64 SpO2 94% on RA. Left Hip Xray showed acute left intertrochanteric fracture, orthopedic consulted and plan for surgical fixation, While in the ED the patient was noted to have tachycardia. An EKG was performed which showed that the patient was in new onset Afib with RVR. She was given one dose of Cardizem 10 IV which controlled the rate.     PHYSICAL EXAM:  GENERAL: NAD, well-developed.  HEAD:  Atraumatic, Normocephalic.  EYES: EOMI, PERRLA, conjunctiva and sclera clear.  NECK: Supple, No JVD.  CHEST/LUNG: Clear to auscultation bilaterally; No wheeze.  HEART: Regular rate and rhythm; S1 S2.   ABDOMEN: Soft, Nontender, Nondistended; Bowel sounds present.  EXTREMITIES:  left lower leg shortened and externally rotated.   PSYCH: AAOx3.  NEUROLOGY: non-focal.  SKIN: No rashes or lesions.    A/P:   Acute Left trochanteric fracture:   Orthopedic follow up for surgery. Pain control, DVT prophylaxis.     New Paroxysmal Atrial fibrillation with RVR:   Improved, s/p Caridzem IV  Start on Metoprolol 25mg BID. Start on Eliquis post op.   Send TSH and Echo.     Medical clearance:   Patient is stable now   Cardiac clearance is pending.   Patient is medically ok for planned surgery.   Cardiac risk stratification per cardiology.

## 2021-11-19 NOTE — H&P ADULT - ASSESSMENT
88 y/o female with a PMH of hypothyroidism, hyperlipidemia, osteoarthritis, lupus, and diabetes who presented to the ED after a fall and was found to have a left intertrochanteric femur fracture. While in the ED the patient also developed new onset Afib with RVR which was rate controlled with IV cardizem.     #Left intertrochanteric femur fracture 2/2 mechanical fall  #Osteoarthritis  - Pt had mechanical fall on the left side leading to fracture  - Pt not on any AC  - Imaging confirmed acute comminuted intertrochanteric femur fracture  - Orthopedic team evaluated patient and spoke to family, will take pt to OR for ORIF after medical optimization  - Keep NPO after midnight  - Follow up evening labs and replete as needed  - Pain control as needed    #New onset Afib with RVR  - Pt was in NSR when arriving in the ED on initial EKG  - Pt became tachycardic while in the ED  - Repeat EKG showed Afib with RVR  - Pt given one dose of Cardizem 10 IV  - Became subsequently rate controlled  - Repeat EKG ordered  - Cardio consult pending  - Echo pending    #HLD  - Continue Atorvastatin 10    #Hypothyroidism  - Continue Synthroid      DVT ppx: SCD's, can resume AC when cleared by ortho  GI ppx: Protonix  Activity: Bedrest  Diet: NPO after midnight  Dispo: Admit to medicine 88 y/o female with a PMH of hypothyroidism, hyperlipidemia, osteoarthritis, lupus, and diabetes who presented to the ED after a fall and was found to have a left intertrochanteric femur fracture. While in the ED the patient also developed new onset Afib with RVR which was rate controlled with IV cardizem.     #Left intertrochanteric femur fracture 2/2 mechanical fall  #Osteoarthritis  - Pt had mechanical fall on the left side leading to fracture  - Pt not on any AC  - Imaging confirmed acute comminuted intertrochanteric femur fracture  - Orthopedic team evaluated patient and spoke to family, will take pt to OR for ORIF after medical optimization  - Keep NPO for now, if ORIF does not happen today make NPO after midnight  - Follow up evening labs and replete as needed  - Pain control as needed    #New onset Afib with RVR  - Pt was in NSR when arriving in the ED on initial EKG  - Pt became tachycardic while in the ED  - Repeat EKG showed Afib with RVR  - Pt given one dose of Cardizem 10 IV  - Became subsequently rate controlled  - Repeat EKG ordered  - Cardio consult pending  - Echo pending    #HLD  - Continue Atorvastatin 10    #Hypothyroidism  - Continue Synthroid      DVT ppx: SCD's, can resume AC when cleared by ortho  GI ppx: Protonix  Activity: Bedrest  Diet: NPO after midnight  Dispo: Admit to medicine 88 y/o female with a PMH of hypothyroidism, hyperlipidemia, osteoarthritis, lupus, and diabetes who presented to the ED after a fall and was found to have a left intertrochanteric femur fracture. While in the ED the patient also developed new onset Afib with RVR which was rate controlled with IV cardizem.     #Left intertrochanteric femur fracture 2/2 mechanical fall  #Osteoarthritis  - Pt had mechanical fall on the left side leading to fracture  - Pt not on any AC  - Imaging confirmed acute comminuted intertrochanteric femur fracture  - Orthopedic team evaluated patient and spoke to family, will take pt to OR for ORIF after medical optimization  - Keep NPO for now, if ORIF does not happen today make NPO after midnight  - Pt can eat dinner if does not go for ORIF today  - Follow up evening labs and replete as needed  - Pain control as needed    #New onset Afib with RVR  - Pt was in NSR when arriving in the ED on initial EKG  - Pt became tachycardic while in the ED  - Repeat EKG showed Afib with RVR  - Pt given one dose of Cardizem 10 IV  - Became subsequently rate controlled  - Repeat EKG ordered  - Cardio consult pending  - Echo pending    #HLD  - Continue Atorvastatin 10    #Hypothyroidism  - Continue Synthroid      DVT ppx: SCD's, can resume AC when cleared by ortho  GI ppx: Protonix  Activity: Bedrest  Diet: NPO after midnight  Dispo: Admit to medicine

## 2021-11-19 NOTE — ED ADULT TRIAGE NOTE - CHIEF COMPLAINT QUOTE
pt BIBEMS s/p fall from standing, (-) blood thinners, loc, (+) head injury, left leg externally rotated

## 2021-11-19 NOTE — ED ADULT NURSE NOTE - OBJECTIVE STATEMENT
pt presents to ed s/p fall from standing. denies anticoagulation denies LOC. + head injury. Pt noted with externally rotated left leg.

## 2021-11-19 NOTE — PRE PROCEDURE NOTE - PRE PROCEDURE EVALUATION
ORTHOPEDIC SURGERY PRE OP NOTE      Diagnosis: HIP FRACTURE     Planned Procedure: ORIF     Consent: TO BE OBTAINED BY ATTENDING                   Risks/benefits/alternatives were discussed with the patient/family and they wish to proceed with surgery.   PT CAN CONSENT HERSELF     ANTICIPATED DATE OF PROCEDURE : 11/20  SCHEDULED CASE OR ADD-ON CASE:  ADD ON       Consent:     Clearances:   [**X*]CARDS    [***] Other:    T(C): 36.7 (11-19-21 @ 00:07), Max: 36.7 (11-19-21 @ 00:07)  HR: 88 (11-19-21 @ 11:07) (64 - 136)  BP: 155/70 (11-19-21 @ 11:07) (140/82 - 155/70)  RR: 18 (11-19-21 @ 00:07) (18 - 18)  SpO2: 94% (11-19-21 @ 00:07) (94% - 94%)    Labs:                        13.1   3.62  )-----------( 169      ( 19 Nov 2021 00:40 )             38.7     11-19    138  |  101  |  5<L>  ----------------------------<  113<H>  3.9   |  22  |  0.9    Ca    10.1      19 Nov 2021 00:40    TPro  6.6  /  Alb  4.0  /  TBili  0.7  /  DBili  x   /  AST  22  /  ALT  10  /  AlkPhos  129<H>  11-19    PT/INR - ( 19 Nov 2021 00:40 )   PT: 12.90 sec;   INR: 1.12 ratio         PTT - ( 19 Nov 2021 00:40 )  PTT:31.4 sec  Type and Screen X 2:    COVID-19 PCR: NotDetec (19 Nov 2021 05:20)  COVID-19 PCR: NotDetec (29 Sep 2021 10:30)  COVID-19 PCR: NotDetec (26 Sep 2021 03:01)    [ ]Pregnancy test ( if female of childbearing age) : ***  [ ]EKG: DONE   [ ]CXR: NAPD       DIET: NPO after midnight  IVF: per primary team      ANTICOAGULATION STATUS ( include name of anticoagulant) :  [***] CONTINUE (**name of anticoagulant) ASA  [***] DISCONTINUE(** name of anticoagulant)                                           A/P: Patient is a 89y y/o Female Pending ****** tomorrow    [ ] -NPO and IVF @ midnight  [ ]pain control/analgesia prn per primary team   [ ]Incentive Spirometry   [ ]F/U Clearance  [ ]F/U Pending Labs  [ ]Notify Ortho with any questions- spectra [id number]    [ ]DISCUSSED WITH PRIMARY TEAM MEMBER (name of team member): ****Hillary/ ANDREA  [ ]Date and Time DISCUSSED WITH PRIMARY TEAM MEMBER: ****11/19 1800

## 2021-11-19 NOTE — H&P ADULT - NSHPPHYSICALEXAM_GEN_ALL_CORE
GENERAL: NAD, well-developed, AAOx3  HEAD: Atraumatic, Normocephalic.   EYES: EOMI, PERRLA, conjunctiva and sclera clear  ENT: No JVD, airways clear, dry tongue and cracked lips  PULMONARY: Clear to auscultation bilaterally; No wheeze  CARDIOVASCULAR: Regular rate and rhythm; No murmurs, rubs, or gallops, pt was previously tachycardic and in afib  GASTROINTESTINAL: Soft, Nontender, Nondistended; Bowel sounds present  MUSCULOSKELETAL: (+) tenderness over left hip, unable to demonstrate ROM without pain, 5/5 strength in all other extremities  NEUROLOGY: Cranial nerves 2-12 intact  SKIN: No rashes or lesions

## 2021-11-19 NOTE — ED PROVIDER NOTE - PROGRESS NOTE DETAILS
JK: ortho. consulted, will come to see patient in the ED. JK: imaging showing acute comminuted L intertrochanteric femur fx. To possibly go for OR later today. To be kept NPO per ortho, will give IV fluids.

## 2021-11-19 NOTE — ED PROVIDER NOTE - PHYSICAL EXAMINATION
CONSTITUTIONAL: Well-developed; well-nourished; in no acute distress.   SKIN: warm, dry.  HEAD: Normocephalic; atraumatic.  EYES: PERRL, EOMI, no conjunctival erythema.  ENT: No nasal discharge; airway clear.  NECK: Supple; non tender. No cervical spine ttp.  CARD: S1, S2 normal; no murmurs, gallops, or rubs. Regular rate and rhythm.   RESP: No wheezes, rales, or rhonchi. Lungs CTAB.  ABD: soft ntnd; no masses, rebound, or guarding.  EXT: No clubbing, cyanosis, or edema. +ttp over L hip, extremity is shortened and externally rotated, peripheral pulses 2+ in b/l lower extremities.  NEURO: Alert, oriented, grossly unremarkable. No focal neuro. deficits. Sensation intact in b/l LE.  PSYCH: Cooperative, appropriate. CONSTITUTIONAL: Well-developed; well-nourished; in no acute distress.   SKIN: warm, dry.  HEAD: Normocephalic; atraumatic.  EYES: PERRL, EOMI, no conjunctival erythema.  ENT: No nasal discharge; airway clear.  NECK: Supple; non tender. No cervical spine ttp.  CARD: S1, S2 normal; no murmurs, gallops, or rubs. Regular rate and rhythm.   RESP: No wheezes, rales, or rhonchi. Lungs CTAB.  ABD: soft ntnd; no masses, rebound, or guarding.  MSK: No clubbing, cyanosis, or edema. +ttp over L hip, extremity is shortened and externally rotated, peripheral pulses 2+ in b/l lower extremities. No midline spinal ttp.  NEURO: Alert, oriented, grossly unremarkable. No focal neuro. deficits. Sensation intact in b/l LE.  PSYCH: Cooperative, appropriate.

## 2021-11-19 NOTE — CONSULT NOTE ADULT - ATTENDING COMMENTS
agree with above  left hip IT fracture  plan for or pending medical optimization and clearances
Mod ot high risk for MACE  paroxysmal a.fib  cont bb  ok to proceed with sx as planned  tele monitoring post op  risk and benefits of long term a/c should be d/w the pt and her family

## 2021-11-19 NOTE — H&P ADULT - NSHPSOCIALHISTORY_GEN_ALL_CORE
Lives in apartment alone  Ambulates with walker at baseline  Niece is healthcare proxy  Does not smoke, drink, or use drugs

## 2021-11-19 NOTE — CONSULT NOTE ADULT - SUBJECTIVE AND OBJECTIVE BOX
Orthopaedics Consult Note    MELANIA TRISTAN  [phone]  Ph:    Time consult called: 520 am  Time patient seen: 545 am    Patient is a 89y year old Female with complaint of left hip pain and inability to walk after mechanical fall earlier today.  Imaging demonstrated left intertrochanteric femur fracture.  Baseline ambulates with walker.  Denies pain or injury elsewhere.  Denies head trauma/LOC.  Denies numbness/tingling/radiating pain.      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        Medications      Allergies  No Known Allergies        T(C): 36.7 (11-19-21 @ 00:07), Max: 36.7 (11-19-21 @ 00:07)  HR: 64 (11-19-21 @ 00:07) (64 - 64)  BP: 140/82 (11-19-21 @ 00:07) (140/82 - 140/82)  RR: 18 (11-19-21 @ 00:07) (18 - 18)  SpO2: 94% (11-19-21 @ 00:07) (94% - 94%)    Physical Exam  NAD  Breathing comfortably on RA  Resting comfortably    B/L UE  skin intact, full painless AROM, nonTTP throughout  Motor intact ax/ain/pin/io  SILT ax/m/r/u  palpable radial pulse, fingers wwp      LLE  Skin intact  shortened, externally rotated extremity  (+)TTP at hip  (+)pain w/ log roll/axial load  NonTTP elsewhere in extremity  Motor: TA/EHL/FHL/gastroc intact  SILT s/s/sp/dp/t  foot/toes wwp, palpable DP pulse      RLE  skin intact  No deformity/laceration/abrasion noted  No swelling/erythema/ecchymosis noted  Motor: TA/EHL/FHL/Gastroc intact  SILT s/s/sp/dp/t  foot/toes wwp, palpable DP pulse    Labs                        13.1   3.62  )-----------( 169      ( 19 Nov 2021 00:40 )             38.7     11-19    138  |  101  |  5<L>  ----------------------------<  113<H>  3.9   |  22  |  0.9    Ca    10.1      19 Nov 2021 00:40    TPro  6.6  /  Alb  4.0  /  TBili  0.7  /  DBili  x   /  AST  22  /  ALT  10  /  AlkPhos  129<H>  11-19    LIVER FUNCTIONS - ( 19 Nov 2021 00:40 )  Alb: 4.0 g/dL / Pro: 6.6 g/dL / ALK PHOS: 129 U/L / ALT: 10 U/L / AST: 22 U/L / GGT: x           PT/INR - ( 19 Nov 2021 00:40 )   PT: 12.90 sec;   INR: 1.12 ratio         PTT - ( 19 Nov 2021 00:40 )  PTT:31.4 sec    Img  XR ap pelvis, left hip, femur, knee: intertrochanteric hip fracture    CT pelvis: intertrochanteric hip fracture    A/P: Patient is a 89y year old Female with L intertrochanteric femur fracture. Added on for left hip ORIF      - nonweightbearing to LLE  - Counseled patient/family that surgical fixation is necessary in order to resume function/improve pain.  R/B/C/A discussed at length and patient/family amenable to plan.  Patient added on to OR schedule for Left Hip ORIF for today or tomorrow  - NPO NOW/IV fluids  - please obtain all pre-op labs/studies (CBC, CMP, Type and Screen x 2, PT/PTT/INR, EKG, CXR)  - Rapid COVID test  - 2u pRBC on hold for OR  - DVT ppx:  (give stat does of chem DVT ppx tonight, hold in AM before surgery), SCDs  - Home meds to be resumed  - pain control PRN  - will need clearance/risk stratification note from primary team prior to proceeding with surgery, with note signed by medicine attending  - Ortho to follow  - remainder of care per primary team  
Cardiologist: none    HPI:  The patient is an 88 y/o female with a PMH of hypothyroidism, hyperlipidemia, osteoarthritis, lupus, and diabetes who presented to the ED after a fall. The patient was walking at home when she tripped and fell, landing on her left hip and side. She states that she was unable to get up after this happened. She denies losing consciousness. She is not any anticoagulation. The patient lives by herself  and ambulates with a walker at home at baseline. She reports that she is able to do most of her ADL's independently. She denies any other associated symptoms.    In the ED the patient's vitals were T98 /82 HR 64 SpO2 94% on RA. While in the ED the patient was noted to have tachycardia. An EKG was performed which showed that the patient was in new onset Afib with RVR. She was given one dose of Cardizem 10 IV which controlled the rate.      (19 Nov 2021 08:42)    She admits to having taken immodium as she was having multiple BM. She denied diarrhea however. She also stated that she has not been eating and drinking very well over the past few days.    PAST MEDICAL & SURGICAL HISTORY  DM (diabetes mellitus)    Osteoarthritis    Hypothyroidism    Lupus    Hyperlipidemia    History of hysterectomy    History of cholecystectomy    History of lumpectomy      FAMILY HISTORY:  FAMILY HISTORY:  No pertinent family history in first degree relatives    [ ] no pertinent family history of premature cardiovascular disease in first degree relatives.  Mother:   Father:   Siblings:     SOCIAL HISTORY:  []smoker  []Alcohol  []Drug    ALLERGIES:  No Known Allergies    MEDICATIONS:  MEDICATIONS  (STANDING):  aspirin enteric coated 81 milliGRAM(s) Oral daily  atorvastatin 10 milliGRAM(s) Oral at bedtime  folic acid 1 milliGRAM(s) Oral daily  gabapentin 300 milliGRAM(s) Oral three times a day  levothyroxine 150 MICROGram(s) Oral daily  morphine  - Injectable 2 milliGRAM(s) IV Push once  pantoprazole    Tablet 40 milliGRAM(s) Oral before breakfast    HOME MEDICATIONS:  Home Medications:  Aspir 81 oral delayed release tablet: 1 tab(s) orally once a day (26 Sep 2021 16:06)  atorvastatin 10 mg oral tablet: 1 tab(s) orally once a day (26 Sep 2021 16:13)  folic acid 1 mg oral tablet: 1 tab(s) orally once a day (26 Sep 2021 16:13)  gabapentin 300 mg oral capsule: 1 cap(s) orally 3 times a day (29 Jan 2020 16:36)  Synthroid 150 mcg (0.15 mg) oral tablet: 1 tab(s) orally once a day (29 Jan 2020 16:36)    VITALS:   T(F): 98 (11-19 @ 00:07), Max: 98 (11-19 @ 00:07)  HR: 88 (11-19 @ 11:07) (64 - 136)  BP: 155/70 (11-19 @ 11:07) (140/82 - 155/70)  BP(mean): 101 (11-19 @ 07:40) (99 - 101)  RR: 18 (11-19 @ 00:07) (18 - 18)  SpO2: 94% (11-19 @ 00:07) (94% - 94%)    REVIEW OF SYSTEMS:  CONSTITUTIONAL: No weakness, fevers or chills  EYES: No visual changes  ENT: No vertigo or throat pain   NECK: No pain or stiffness  RESPIRATORY: No cough, wheezing, hemoptysis; No shortness of breath  CARDIOVASCULAR: No chest pain or palpitations  GASTROINTESTINAL: No abdominal or epigastric pain. + nausea. No vomiting, or hematemesis; + diarrhea? No constipation. No melena or hematochezia.  GENITOURINARY: No dysuria, frequency or hematuria  NEUROLOGICAL: No numbness or weakness  SKIN: No itching, no rashes  MSK: FROM x4    PHYSICAL EXAM:  NEURO: patient is awake , alert and oriented  GEN: Not in acute distress  NECK: no thyroid enlargement, no JVD  LUNGS: Clear to auscultation bilaterally   CARDIOVASCULAR: S1/S2 present, RRR , no murmurs or rubs, no carotid bruits,  + PP bilaterally  ABD: Soft, non-tender, non-distended, +BS  EXT: No LORIE  SKIN: Intact    LABS:                        13.1   3.62  )-----------( 169      ( 19 Nov 2021 00:40 )             38.7     11-19    138  |  101  |  5<L>  ----------------------------<  113<H>  3.9   |  22  |  0.9    Ca    10.1      19 Nov 2021 00:40    TPro  6.6  /  Alb  4.0  /  TBili  0.7  /  DBili  x   /  AST  22  /  ALT  10  /  AlkPhos  129<H>  11-19    PT/INR - ( 19 Nov 2021 00:40 )   PT: 12.90 sec;   INR: 1.12 ratio      PTT - ( 19 Nov 2021 00:40 )  PTT:31.4 sec  Lactate, Blood: 1.7 mmol/L (11-19-21 @ 00:40)      RADIOLOGY:  -CXR:  < from: CT Chest No Cont (11.19.21 @ 02:35) >  IMPRESSION:      1.  Acute comminuted left intertrochanteric femur fracture.  2.  Left lower pole 2.6 cm hypodense indeterminate renal lesion. Recommend nonemergent renal ultrasound for further evaluation.  3.  Right upper lobe 6 mm solid pulmonary nodule. Follow-up in 3-6 months is recommended.    < end of copied text >    -TTE: pending  -CCTA:  -STRESS TEST:  -CATHETERIZATION:    ECG:  < from: 12 Lead ECG (11.19.21 @ 01:38) >  Ventricular Rate 68 BPM    Atrial Rate 68 BPM    P-R Interval 128 ms    QRS Duration 152 ms    Q-T Interval 448 ms    QTC Calculation(Bazett) 476 ms    P Axis 92 degrees    R Axis 24 degrees    T Axis 150 degrees    Diagnosis Line Normal sinus rhythm  Left bundle branch block    < end of copied text >      TELEMETRY EVENTS: Afib 136 bpm. currently NSR

## 2021-11-19 NOTE — H&P ADULT - HISTORY OF PRESENT ILLNESS
The patient is an 89 y/o female with a PMH of hypothyroidism, hyperlipidemia, osteoarthritis, lupus, and diabetes who presented to the ED after a fall. The patient was walking at home when she tripped and fell, landing on her left hip and side. She states that she was unable to get up after this happened. She denies losing consciousness. She is not any anticoagulation. The patient lives by herself  and ambulates with a walker at home at baseline. She reports that she is able to do most of her ADL's independently. She denies any other associated symptoms.    In the ED the patient's vitals were T98 /82 HR 64 SpO2 94% on RA. While in the ED the patient was noted to have tachycardia. An EKG was performed which showed that the patient was in new onset Afib with RVR. She was given one dose of Cardizem 10 IV which controlled the rate.

## 2021-11-19 NOTE — ED ADULT NURSE NOTE - NSIMPLEMENTINTERV_GEN_ALL_ED
Implemented All Fall with Harm Risk Interventions:  [place] to call system. Call bell, personal items and telephone within reach. Instruct patient to call for assistance. Room bathroom lighting operational. Non-slip footwear when patient is off stretcher. Physically safe environment: no spills, clutter or unnecessary equipment. Stretcher in lowest position, wheels locked, appropriate side rails in place. Provide visual cue, wrist band, yellow gown, etc. Monitor gait and stability. Monitor for mental status changes and reorient to person, place, and time. Review medications for side effects contributing to fall risk. Reinforce activity limits and safety measures with patient and family. Provide visual clues: red socks.

## 2021-11-19 NOTE — CONSULT NOTE ADULT - ASSESSMENT
New onset Afib  Acute L intertrochanteric fx s/p mechanical fall vs syncope  DLD  DM    -get TTE   -moderate risk for moderate risk procedure. This is pre-op stratification; the decision to perform surgery is determined by the primary team and surgeons.  -would monitor on tele delores-operatively  -patient has had multiple falls and admissions to rehab for such. Would need to weigh risk of bleed vs benefit of stroke prevention with AC once surgery has been performed.  -PT eval  -social work eval as patient lives alone and appears to be having multiple falls and declining ability to care for self New onset Afib  Acute L intertrochanteric fx s/p mechanical fall vs vasovagal? syncope  DLD  DM    -get TTE   -moderate risk for moderate risk procedure. This is pre-op stratification; the decision to perform surgery is determined by the primary team and surgeons.  -check orthostatics  -check Mg. Keep K>4 and Mg>2  -would monitor on tele delores-operatively  -patient has had multiple falls and admissions to rehab for such. Would need to weigh risk of bleed vs benefit of stroke prevention with AC once surgery has been performed.  -PT eval  -social work eval as patient lives alone and appears to be having multiple falls and declining ability to care for self

## 2021-11-20 LAB
COVID-19 SPIKE DOMAIN AB INTERP: POSITIVE
COVID-19 SPIKE DOMAIN ANTIBODY RESULT: 92.1 U/ML — HIGH
SARS-COV-2 IGG+IGM SERPL QL IA: 92.1 U/ML — HIGH
SARS-COV-2 IGG+IGM SERPL QL IA: POSITIVE
TSH SERPL-MCNC: 4.36 UIU/ML — HIGH (ref 0.27–4.2)

## 2021-11-20 PROCEDURE — 99232 SBSQ HOSP IP/OBS MODERATE 35: CPT

## 2021-11-20 RX ORDER — MAGNESIUM HYDROXIDE 400 MG/1
30 TABLET, CHEWABLE ORAL DAILY
Refills: 0 | Status: DISCONTINUED | OUTPATIENT
Start: 2021-11-20 | End: 2021-11-29

## 2021-11-20 RX ORDER — KETOROLAC TROMETHAMINE 30 MG/ML
15 SYRINGE (ML) INJECTION ONCE
Refills: 0 | Status: DISCONTINUED | OUTPATIENT
Start: 2021-11-20 | End: 2021-11-23

## 2021-11-20 RX ORDER — CEFAZOLIN SODIUM 1 G
2000 VIAL (EA) INJECTION EVERY 8 HOURS
Refills: 0 | Status: COMPLETED | OUTPATIENT
Start: 2021-11-20 | End: 2021-11-21

## 2021-11-20 RX ORDER — OXYCODONE HYDROCHLORIDE 5 MG/1
5 TABLET ORAL EVERY 4 HOURS
Refills: 0 | Status: DISCONTINUED | OUTPATIENT
Start: 2021-11-20 | End: 2021-11-20

## 2021-11-20 RX ORDER — GABAPENTIN 400 MG/1
300 CAPSULE ORAL THREE TIMES A DAY
Refills: 0 | Status: DISCONTINUED | OUTPATIENT
Start: 2021-11-20 | End: 2021-12-02

## 2021-11-20 RX ORDER — HEPARIN SODIUM 5000 [USP'U]/ML
5000 INJECTION INTRAVENOUS; SUBCUTANEOUS EVERY 8 HOURS
Refills: 0 | Status: DISCONTINUED | OUTPATIENT
Start: 2021-11-20 | End: 2021-11-21

## 2021-11-20 RX ORDER — HYDROMORPHONE HYDROCHLORIDE 2 MG/ML
1 INJECTION INTRAMUSCULAR; INTRAVENOUS; SUBCUTANEOUS
Refills: 0 | Status: DISCONTINUED | OUTPATIENT
Start: 2021-11-20 | End: 2021-11-20

## 2021-11-20 RX ORDER — ASPIRIN/CALCIUM CARB/MAGNESIUM 324 MG
81 TABLET ORAL DAILY
Refills: 0 | Status: DISCONTINUED | OUTPATIENT
Start: 2021-11-20 | End: 2021-11-24

## 2021-11-20 RX ORDER — HYDROMORPHONE HYDROCHLORIDE 2 MG/ML
0.5 INJECTION INTRAMUSCULAR; INTRAVENOUS; SUBCUTANEOUS
Refills: 0 | Status: DISCONTINUED | OUTPATIENT
Start: 2021-11-20 | End: 2021-11-20

## 2021-11-20 RX ORDER — ONDANSETRON 8 MG/1
4 TABLET, FILM COATED ORAL EVERY 6 HOURS
Refills: 0 | Status: DISCONTINUED | OUTPATIENT
Start: 2021-11-20 | End: 2021-12-02

## 2021-11-20 RX ORDER — FOLIC ACID 0.8 MG
1 TABLET ORAL DAILY
Refills: 0 | Status: DISCONTINUED | OUTPATIENT
Start: 2021-11-20 | End: 2021-12-02

## 2021-11-20 RX ORDER — METOPROLOL TARTRATE 50 MG
25 TABLET ORAL
Refills: 0 | Status: DISCONTINUED | OUTPATIENT
Start: 2021-11-20 | End: 2021-12-02

## 2021-11-20 RX ORDER — SODIUM CHLORIDE 9 MG/ML
1000 INJECTION, SOLUTION INTRAVENOUS
Refills: 0 | Status: DISCONTINUED | OUTPATIENT
Start: 2021-11-20 | End: 2021-11-20

## 2021-11-20 RX ORDER — MORPHINE SULFATE 50 MG/1
2 CAPSULE, EXTENDED RELEASE ORAL ONCE
Refills: 0 | Status: DISCONTINUED | OUTPATIENT
Start: 2021-11-20 | End: 2021-11-20

## 2021-11-20 RX ORDER — MORPHINE SULFATE 50 MG/1
2 CAPSULE, EXTENDED RELEASE ORAL EVERY 4 HOURS
Refills: 0 | Status: DISCONTINUED | OUTPATIENT
Start: 2021-11-20 | End: 2021-11-21

## 2021-11-20 RX ORDER — PANTOPRAZOLE SODIUM 20 MG/1
40 TABLET, DELAYED RELEASE ORAL
Refills: 0 | Status: DISCONTINUED | OUTPATIENT
Start: 2021-11-20 | End: 2021-12-02

## 2021-11-20 RX ORDER — ATORVASTATIN CALCIUM 80 MG/1
10 TABLET, FILM COATED ORAL AT BEDTIME
Refills: 0 | Status: DISCONTINUED | OUTPATIENT
Start: 2021-11-20 | End: 2021-12-02

## 2021-11-20 RX ORDER — LEVOTHYROXINE SODIUM 125 MCG
150 TABLET ORAL DAILY
Refills: 0 | Status: DISCONTINUED | OUTPATIENT
Start: 2021-11-20 | End: 2021-12-02

## 2021-11-20 RX ORDER — ONDANSETRON 8 MG/1
4 TABLET, FILM COATED ORAL ONCE
Refills: 0 | Status: COMPLETED | OUTPATIENT
Start: 2021-11-20 | End: 2021-11-20

## 2021-11-20 RX ORDER — MEPERIDINE HYDROCHLORIDE 50 MG/ML
12.5 INJECTION INTRAMUSCULAR; INTRAVENOUS; SUBCUTANEOUS
Refills: 0 | Status: DISCONTINUED | OUTPATIENT
Start: 2021-11-20 | End: 2021-11-20

## 2021-11-20 RX ADMIN — GABAPENTIN 300 MILLIGRAM(S): 400 CAPSULE ORAL at 13:38

## 2021-11-20 RX ADMIN — HEPARIN SODIUM 5000 UNIT(S): 5000 INJECTION INTRAVENOUS; SUBCUTANEOUS at 21:49

## 2021-11-20 RX ADMIN — OXYCODONE HYDROCHLORIDE 5 MILLIGRAM(S): 5 TABLET ORAL at 13:38

## 2021-11-20 RX ADMIN — ONDANSETRON 4 MILLIGRAM(S): 8 TABLET, FILM COATED ORAL at 10:52

## 2021-11-20 RX ADMIN — Medication 25 MILLIGRAM(S): at 06:22

## 2021-11-20 RX ADMIN — Medication 25 MILLIGRAM(S): at 17:14

## 2021-11-20 RX ADMIN — Medication 81 MILLIGRAM(S): at 12:24

## 2021-11-20 RX ADMIN — HEPARIN SODIUM 5000 UNIT(S): 5000 INJECTION INTRAVENOUS; SUBCUTANEOUS at 13:39

## 2021-11-20 RX ADMIN — Medication 1 MILLIGRAM(S): at 12:24

## 2021-11-20 RX ADMIN — OXYCODONE HYDROCHLORIDE 5 MILLIGRAM(S): 5 TABLET ORAL at 16:34

## 2021-11-20 RX ADMIN — PANTOPRAZOLE SODIUM 40 MILLIGRAM(S): 20 TABLET, DELAYED RELEASE ORAL at 06:22

## 2021-11-20 RX ADMIN — GABAPENTIN 300 MILLIGRAM(S): 400 CAPSULE ORAL at 21:55

## 2021-11-20 RX ADMIN — GABAPENTIN 300 MILLIGRAM(S): 400 CAPSULE ORAL at 06:21

## 2021-11-20 RX ADMIN — MORPHINE SULFATE 2 MILLIGRAM(S): 50 CAPSULE, EXTENDED RELEASE ORAL at 11:21

## 2021-11-20 RX ADMIN — ATORVASTATIN CALCIUM 10 MILLIGRAM(S): 80 TABLET, FILM COATED ORAL at 21:55

## 2021-11-20 RX ADMIN — Medication 150 MICROGRAM(S): at 06:21

## 2021-11-20 RX ADMIN — MORPHINE SULFATE 2 MILLIGRAM(S): 50 CAPSULE, EXTENDED RELEASE ORAL at 11:20

## 2021-11-20 RX ADMIN — Medication 100 MILLIGRAM(S): at 16:19

## 2021-11-20 NOTE — PROGRESS NOTE ADULT - SUBJECTIVE AND OBJECTIVE BOX
ORTHOPAEDIC SURGERY POST-OP NOTE    PROCEDURE: L FEMUR INTRAMEDULLARY NAILING    Patient seen and examined at bedside approximately 6h after above procedure. Pain well controlled. Resting comfortably. Uncooperative with exam.    LLE  Dressing in place over L hip with moderate spotting  Compartments soft, compressible  Uncooperative with motor/sensory exam  Spontaneously moving toes  Foot wwp, DP pulse 2+    A&P  88yo F w/ L intertrochanteric hip fracture now s/p IMN earlier today. Doing well on post-op check.  -WBAT LLE  -ancef 2g q8h for 24h  -DVT ppx  -IS  -pain control  -PT/OT  -please contact ortho with any questions or concerns

## 2021-11-20 NOTE — PROGRESS NOTE ADULT - SUBJECTIVE AND OBJECTIVE BOX
SUBJECTIVE:    Patient is a 89y old Female who presents with a chief complaint of Fall (19 Nov 2021 11:20)  Currently admitted to medicine with the primary diagnosis of Intertrochanteric fracture  Today is hospital day 1d. This morning she is resting comfortably in bed and reports no new issues or overnight events.     PAST MEDICAL & SURGICAL HISTORY  DM (diabetes mellitus)    Osteoarthritis    Hypothyroidism    Lupus    Hyperlipidemia    History of hysterectomy    History of cholecystectomy    History of lumpectomy      SOCIAL HISTORY:  Negative for smoking/alcohol/drug use.     ALLERGIES:  No Known Allergies    MEDICATIONS:  STANDING MEDICATIONS  aspirin enteric coated 81 milliGRAM(s) Oral daily  atorvastatin 10 milliGRAM(s) Oral at bedtime  ceFAZolin   IVPB 2000 milliGRAM(s) IV Intermittent every 8 hours  folic acid 1 milliGRAM(s) Oral daily  gabapentin 300 milliGRAM(s) Oral three times a day  heparin   Injectable 5000 Unit(s) SubCutaneous every 8 hours  levothyroxine 150 MICROGram(s) Oral daily  metoprolol tartrate 25 milliGRAM(s) Oral two times a day  pantoprazole    Tablet 40 milliGRAM(s) Oral before breakfast    PRN MEDICATIONS  ketorolac   Injectable 15 milliGRAM(s) IV Push once PRN  magnesium hydroxide Suspension 30 milliLiter(s) Oral daily PRN  morphine  - Injectable 2 milliGRAM(s) IV Push every 4 hours PRN  ondansetron Injectable 4 milliGRAM(s) IV Push every 6 hours PRN  oxyCODONE    IR 5 milliGRAM(s) Oral every 4 hours PRN    VITALS:   T(F): 96  HR: 69  BP: 90/43  RR: 18  SpO2: 96%    LABS:                        12.1   7.12  )-----------( 168      ( 19 Nov 2021 21:00 )             36.3     11-19    138  |  99  |  6<L>  ----------------------------<  143<H>  3.3<L>   |  23  |  0.7    Ca    9.3      19 Nov 2021 21:00  Mg     1.4     11-19    TPro  6.2  /  Alb  3.7  /  TBili  0.9  /  DBili  x   /  AST  17  /  ALT  10  /  AlkPhos  118<H>  11-19    PT/INR - ( 19 Nov 2021 21:00 )   PT: 13.90 sec;   INR: 1.21 ratio         PTT - ( 19 Nov 2021 21:00 )  PTT:37.4 sec    RADIOLOGY:    < from: Xray Femur 2 Views, Left (11.19.21 @ 05:04) >  IMPRESSION:    Acute comminuted intertrochanteric fracture. Osteopenia.    --- End of Report ---    < end of copied text >      PHYSICAL EXAM:  GEN: No acute distress  LUNGS: Clear to auscultation bilaterally   HEART: S1/S2 present. RRR.   ABD: Soft, non-tender, non-distended. Bowel sounds present  EXT: No le edema  NEURO: AAOX3

## 2021-11-20 NOTE — OCCUPATIONAL THERAPY INITIAL EVALUATION ADULT - SPECIFY REASON(S)
Unable to complete OT IE at this time. Pt currently without activity/oob orders. Will follow up once orders have been received.

## 2021-11-20 NOTE — PRE-OP CHECKLIST - 1.
Patient visually impaired secondary to macular degeneration. patient assisted pen to paper when signing consents

## 2021-11-20 NOTE — PROGRESS NOTE ADULT - ASSESSMENT
#Left intertrochanteric femur fracture 2/2 mechanical fall  #Osteoarthritis  - s/p ORIF by ortho today 11/20  - Pain control as needed  - PT/OT/Physiatry     #New onset Afib with RVR, CHADS VASC 3 (AGE/Sex)  - Pt was in NSR when arriving in the ED on initial EKG  - Pt became tachycardic while in the ED  - Repeat EKG showed Afib with RVR  - Pt given one dose of Cardizem 10 IV  - Echo: normal EF, moderate to severe dilated LA, mild pulm HTN  - will discuss with health care proxy A/C risks vs benefits     #HLD- Continue Atorvastatin 10    #Hypothyroidism- Continue Synthroid      DVT ppx: SCD's and heparin   GI ppx: Protonix  Activity: Bedrest  Diet: DASH  Dispo: from home  #Left intertrochanteric femur fracture 2/2 mechanical fall  #Osteoarthritis  - s/p ORIF by ortho today 11/20  - Pain control as needed  - PT/OT/Physiatry     #New onset Afib with RVR, CHADS VASC 3 (AGE/Sex)  - Pt was in NSR when arriving in the ED on initial EKG  - Pt became tachycardic while in the ED  - Repeat EKG showed Afib with RVR  - Pt given one dose of Cardizem 10 IV  - Echo: normal EF, moderate to severe dilated LA, mild pulm HTN  - discussed with the niece [phone] who is the health care proxy: she would like to hold off therapeutic A/C for now till she discuss with the family and get back with final decision for long term a/c    #HLD- Continue Atorvastatin 10    #Hypothyroidism- Continue Synthroid      DVT ppx: SCD's and heparin   GI ppx: Protonix  Activity: Bedrest  Diet: DASH  Dispo: from home

## 2021-11-20 NOTE — BRIEF OPERATIVE NOTE - NSICDXBRIEFPREOP_GEN_ALL_CORE_FT
PRE-OP DIAGNOSIS:  Displaced intertrochanteric fracture of left femur 20-Nov-2021 09:17:00  Wesly Staples

## 2021-11-21 LAB
COVID-19 NUCLEOCAPSID GAM AB INTERP: NEGATIVE — SIGNIFICANT CHANGE UP
COVID-19 NUCLEOCAPSID TOTAL GAM ANTIBODY RESULT: 0.09 INDEX — SIGNIFICANT CHANGE UP
HCT VFR BLD CALC: 30.4 % — LOW (ref 37–47)
HGB BLD-MCNC: 10.2 G/DL — LOW (ref 12–16)
MCHC RBC-ENTMCNC: 31.7 PG — HIGH (ref 27–31)
MCHC RBC-ENTMCNC: 33.6 G/DL — SIGNIFICANT CHANGE UP (ref 32–37)
MCV RBC AUTO: 94.4 FL — SIGNIFICANT CHANGE UP (ref 81–99)
NRBC # BLD: 0 /100 WBCS — SIGNIFICANT CHANGE UP (ref 0–0)
PLATELET # BLD AUTO: 198 K/UL — SIGNIFICANT CHANGE UP (ref 130–400)
RBC # BLD: 3.22 M/UL — LOW (ref 4.2–5.4)
RBC # FLD: 13.3 % — SIGNIFICANT CHANGE UP (ref 11.5–14.5)
SARS-COV-2 IGG+IGM SERPL QL IA: 0.09 INDEX — SIGNIFICANT CHANGE UP
SARS-COV-2 IGG+IGM SERPL QL IA: NEGATIVE — SIGNIFICANT CHANGE UP
WBC # BLD: 20.86 K/UL — HIGH (ref 4.8–10.8)
WBC # FLD AUTO: 20.86 K/UL — HIGH (ref 4.8–10.8)

## 2021-11-21 PROCEDURE — 99233 SBSQ HOSP IP/OBS HIGH 50: CPT

## 2021-11-21 RX ORDER — NYSTATIN CREAM 100000 [USP'U]/G
1 CREAM TOPICAL
Refills: 0 | Status: DISCONTINUED | OUTPATIENT
Start: 2021-11-21 | End: 2021-12-02

## 2021-11-21 RX ORDER — APIXABAN 2.5 MG/1
5 TABLET, FILM COATED ORAL
Refills: 0 | Status: DISCONTINUED | OUTPATIENT
Start: 2021-11-21 | End: 2021-11-24

## 2021-11-21 RX ADMIN — NYSTATIN CREAM 1 APPLICATION(S): 100000 CREAM TOPICAL at 05:53

## 2021-11-21 RX ADMIN — ATORVASTATIN CALCIUM 10 MILLIGRAM(S): 80 TABLET, FILM COATED ORAL at 21:29

## 2021-11-21 RX ADMIN — APIXABAN 5 MILLIGRAM(S): 2.5 TABLET, FILM COATED ORAL at 21:29

## 2021-11-21 RX ADMIN — Medication 81 MILLIGRAM(S): at 11:55

## 2021-11-21 RX ADMIN — GABAPENTIN 300 MILLIGRAM(S): 400 CAPSULE ORAL at 21:29

## 2021-11-21 RX ADMIN — Medication 25 MILLIGRAM(S): at 17:15

## 2021-11-21 RX ADMIN — HEPARIN SODIUM 5000 UNIT(S): 5000 INJECTION INTRAVENOUS; SUBCUTANEOUS at 05:26

## 2021-11-21 RX ADMIN — Medication 100 MILLIGRAM(S): at 07:33

## 2021-11-21 RX ADMIN — PANTOPRAZOLE SODIUM 40 MILLIGRAM(S): 20 TABLET, DELAYED RELEASE ORAL at 05:27

## 2021-11-21 RX ADMIN — Medication 150 MICROGRAM(S): at 05:27

## 2021-11-21 RX ADMIN — HEPARIN SODIUM 5000 UNIT(S): 5000 INJECTION INTRAVENOUS; SUBCUTANEOUS at 13:34

## 2021-11-21 RX ADMIN — GABAPENTIN 300 MILLIGRAM(S): 400 CAPSULE ORAL at 13:35

## 2021-11-21 RX ADMIN — Medication 100 MILLIGRAM(S): at 00:02

## 2021-11-21 RX ADMIN — Medication 25 MILLIGRAM(S): at 05:27

## 2021-11-21 RX ADMIN — GABAPENTIN 300 MILLIGRAM(S): 400 CAPSULE ORAL at 05:27

## 2021-11-21 RX ADMIN — MORPHINE SULFATE 2 MILLIGRAM(S): 50 CAPSULE, EXTENDED RELEASE ORAL at 10:00

## 2021-11-21 RX ADMIN — Medication 1 MILLIGRAM(S): at 11:55

## 2021-11-21 RX ADMIN — NYSTATIN CREAM 1 APPLICATION(S): 100000 CREAM TOPICAL at 17:15

## 2021-11-21 RX ADMIN — MORPHINE SULFATE 2 MILLIGRAM(S): 50 CAPSULE, EXTENDED RELEASE ORAL at 07:33

## 2021-11-21 NOTE — PROGRESS NOTE ADULT - SUBJECTIVE AND OBJECTIVE BOX
ORTHOPAEDIC SURGERY PROGRESS NOTE    PROCEDURE: L FEMUR INTRAMEDULLARY NAILING    Patient seen and examined at bedside this morning. Pain well controlled. Resting comfortably. No new complaints.    LLE  Dressing in place over L hip with distal dressing saturated with blood; removed and replaced by ortho  Compartments soft, compressible  Uncooperative with motor/sensory exam  Spontaneously moving toes  Foot wwp, DP pulse 2+    A&P  88yo F w/ L intertrochanteric hip fracture s/p IMN. Doing well on POD1.  -WBAT LLE  -ancef 2g q8h for 24h  -DVT ppx  -IS  -pain control  -PT/OT  -please contact ortho with any questions or concerns

## 2021-11-21 NOTE — PROGRESS NOTE ADULT - SUBJECTIVE AND OBJECTIVE BOX
CHIEF COMPLAINT:    Patient is a 89y old  Female who presents with a chief complaint of Fall     INTERVAL HPI/OVERNIGHT EVENTS:    Patient seen and examined at bedside. No acute overnight events occurred.    ROS: pt refused to answer questions    Vital Signs:    T(F): 96.5 (21 @ 13:46), Max: 96.5 (21 @ 13:46)  HR: 54 (21 @ 13:46) (54 - 91)  BP: 117/71 (21 @ 13:46) (97/52 - 138/68)  RR: 18 (21 @ 13:46) (16 - 18)  SpO2: 95% (21 @ 19:51) (95% - 95%)  I&O's Summary    2021 07:01  -  2021 13:51  --------------------------------------------------------  IN: 150 mL / OUT: 0 mL / NET: 150 mL      Daily     Daily Weight in k.4 (2021 04:59)  CAPILLARY BLOOD GLUCOSE          PHYSICAL EXAM:  pt refused    Consultant(s) Notes Reviewed:  [x ] YES  [ ] NO  Care Discussed with Consultants/Other Providers [ x] YES  [ ] NO    LABS:                        10.2   20.86 )-----------( 198      ( 2021 05:38 )             30.4         138  |  99  |  6<L>  ----------------------------<  143<H>  3.3<L>   |  23  |  0.7    Ca    9.3      2021 21:00  Mg     1.4         TPro  6.2  /  Alb  3.7  /  TBili  0.9  /  DBili  x   /  AST  17  /  ALT  10  /  AlkPhos  118<H>      PT/INR - ( 2021 21:00 )   PT: 13.90 sec;   INR: 1.21 ratio         PTT - ( 2021 21:00 )  PTT:37.4 sec          RADIOLOGY & ADDITIONAL TESTS:  Imaging or report Personally Reviewed:  [ ] YES  [ ] NO    Telemetry reviewed independently - no events    Medications:  Standing  apixaban 5 milliGRAM(s) Oral two times a day  aspirin enteric coated 81 milliGRAM(s) Oral daily  atorvastatin 10 milliGRAM(s) Oral at bedtime  folic acid 1 milliGRAM(s) Oral daily  gabapentin 300 milliGRAM(s) Oral three times a day  heparin   Injectable 5000 Unit(s) SubCutaneous every 8 hours  levothyroxine 150 MICROGram(s) Oral daily  metoprolol tartrate 25 milliGRAM(s) Oral two times a day  nystatin Cream 1 Application(s) Topical two times a day  pantoprazole    Tablet 40 milliGRAM(s) Oral before breakfast    PRN Meds  ketorolac   Injectable 15 milliGRAM(s) IV Push once PRN  magnesium hydroxide Suspension 30 milliLiter(s) Oral daily PRN  ondansetron Injectable 4 milliGRAM(s) IV Push every 6 hours PRN  oxyCODONE    IR 5 milliGRAM(s) Oral every 4 hours PRN      Case discussed with resident  Care discussed with pt

## 2021-11-21 NOTE — SWALLOW BEDSIDE ASSESSMENT ADULT - ADDITIONAL RECOMMENDATIONS
unable to make diet recommendation at this time, however if pt passes dysphagia screen w/ RN when more awake/alert, MD can initiate a diet w/ SLP to f/u

## 2021-11-21 NOTE — PROGRESS NOTE ADULT - ASSESSMENT
#Left intertrochanteric femur fracture 2/2 mechanical fall  #Osteoarthritis  - s/p ORIF by ortho today 11/20  - Pain control as needed  - PT/OT/Physiatry     #New onset Afib with RVR, CHADS VASC 3 (AGE/Sex)  - Pt was in NSR when arriving in the ED on initial EKG  - Pt became tachycardic while in the ED  - Repeat EKG showed Afib with RVR  - Pt given one dose of Cardizem 10 IV  - Echo: normal EF, moderate to severe dilated LA, mild pulm HTN  - discussed with the niece [phone] who is the health care proxy: she would like to hold off therapeutic A/C for now till she discuss with the family and get back with final decision for long term a/c    #HLD- Continue Atorvastatin 10    #Hypothyroidism- Continue Synthroid      DVT ppx: SCD's and heparin   GI ppx: Protonix  Activity: Bedrest  Diet: DASH  Dispo: from home  88 yo F presented s/p mechnical fall. found to have afib with RVR as wel las left intertrochanteric femur fx s/p mechincal fall.    Left intertrochanteric femur fracture 2/2 mechanical fall  - s/p ORIF by ortho   - Pain control as needed  - PT/OT/Physiatry     New onset paroxysmal Afib with RVR, CHADS VASC 3 (AGE/Sex)  - niece Magui is agreeable for AC, discussed risks of bleeding with eliquis  - from ortho POV ok to start ac  - c/w cardizem    HLD  - Continue Atorvastatin     Hypothyroidism  - Continue Synthroid    Leukocytosis   - likely related to fall/surgery  - monitor    dc telemetry    DVT ppx: SCD's and heparin   GI ppx: Protonix  Activity: Bedrest  Diet: DASH  Dispo: from home     #Progress Note Handoff:  Pending (specify):  physical therapy, possible placement  Family discussion: discussed pending pt ,ac  Disposition: Home___/SNF___/Other________/Unknown at this time____x____

## 2021-11-21 NOTE — PHYSICAL THERAPY INITIAL EVALUATION ADULT - SPECIFY REASON(S)
Chart reviewed. Pt. currently without activity orders. PT evaluation on hold pending activity orders as appropriate. Will follow.
Attempeted to see pt for PT Initial Evaluation, pt encountered sleeping in bed, opens eyes briefly.  As per Ness ROSARIO, pt provided with morphine ~ 9:00. Will f/u for PT later in day.
Chart reviewed. Pt. currently without activity orders. PT evaluation on hold pending OOB orders as appropriate. Spoke with ortho resident ([id number]) made aware. Will follow.
Attempted to see pt this pm for PT Initial Evaluation, pt unable to keep eyes open for more a few seconds, unable to participate with PT. Ness ROSARIO is aware.

## 2021-11-22 LAB
GLUCOSE BLDC GLUCOMTR-MCNC: 175 MG/DL — HIGH (ref 70–99)
GLUCOSE BLDC GLUCOMTR-MCNC: 175 MG/DL — HIGH (ref 70–99)
GLUCOSE BLDC GLUCOMTR-MCNC: 176 MG/DL — HIGH (ref 70–99)
GLUCOSE BLDC GLUCOMTR-MCNC: 183 MG/DL — HIGH (ref 70–99)

## 2021-11-22 PROCEDURE — 99232 SBSQ HOSP IP/OBS MODERATE 35: CPT

## 2021-11-22 RX ADMIN — NYSTATIN CREAM 1 APPLICATION(S): 100000 CREAM TOPICAL at 05:05

## 2021-11-22 RX ADMIN — Medication 81 MILLIGRAM(S): at 11:49

## 2021-11-22 RX ADMIN — Medication 150 MICROGRAM(S): at 05:04

## 2021-11-22 RX ADMIN — APIXABAN 5 MILLIGRAM(S): 2.5 TABLET, FILM COATED ORAL at 17:00

## 2021-11-22 RX ADMIN — NYSTATIN CREAM 1 APPLICATION(S): 100000 CREAM TOPICAL at 17:01

## 2021-11-22 RX ADMIN — APIXABAN 5 MILLIGRAM(S): 2.5 TABLET, FILM COATED ORAL at 05:04

## 2021-11-22 RX ADMIN — GABAPENTIN 300 MILLIGRAM(S): 400 CAPSULE ORAL at 14:26

## 2021-11-22 RX ADMIN — Medication 25 MILLIGRAM(S): at 17:00

## 2021-11-22 RX ADMIN — ATORVASTATIN CALCIUM 10 MILLIGRAM(S): 80 TABLET, FILM COATED ORAL at 21:50

## 2021-11-22 RX ADMIN — Medication 1 MILLIGRAM(S): at 11:50

## 2021-11-22 RX ADMIN — PANTOPRAZOLE SODIUM 40 MILLIGRAM(S): 20 TABLET, DELAYED RELEASE ORAL at 05:04

## 2021-11-22 RX ADMIN — GABAPENTIN 300 MILLIGRAM(S): 400 CAPSULE ORAL at 05:04

## 2021-11-22 RX ADMIN — GABAPENTIN 300 MILLIGRAM(S): 400 CAPSULE ORAL at 21:49

## 2021-11-22 RX ADMIN — Medication 25 MILLIGRAM(S): at 05:04

## 2021-11-22 NOTE — SWALLOW BEDSIDE ASSESSMENT ADULT - SWALLOW EVAL: DIAGNOSIS
PO trials not appropriate at this time 2' lethargy s/p pain meds
No s/s aspiration/penetration for thin liquids, puree, soft. Moderate oral dysphagia

## 2021-11-22 NOTE — PROGRESS NOTE ADULT - SUBJECTIVE AND OBJECTIVE BOX
DAILY PROGRESS NOTE  ===========================================================    Patient Information:  MELANIA TRISTAN  /  89y  /  Female  /  MRN#: [id number]    Hospital Day: 3d     |:::::::::::::::::::::::::::| SUBJECTIVE |:::::::::::::::::::::::::::|    OVERNIGHT EVENTS:   TODAY: Patient was seen today at bedside. Review of systems is otherwise negative.    |:::::::::::::::::::::::::::| OBJECTIVE |:::::::::::::::::::::::::::|    VITAL SIGNS: Last 24 Hours  T(C): 35.6 (22 Nov 2021 14:25), Max: 36.1 (22 Nov 2021 00:29)  T(F): 96.1 (22 Nov 2021 14:25), Max: 96.9 (22 Nov 2021 00:29)  HR: 70 (22 Nov 2021 14:25) (70 - 84)  BP: 94/40 (22 Nov 2021 14:25) (94/40 - 116/53)  BP(mean): --  RR: 18 (22 Nov 2021 14:25) (16 - 18)  SpO2: 94% (21 Nov 2021 19:31) (94% - 94%)    11-21-21 @ 07:01  -  11-22-21 @ 07:00  --------------------------------------------------------  IN: 250 mL / OUT: 800 mL / NET: -550 mL      PHYSICAL EXAM:  GENERAL:   Awake, NAD.  HEENT:  Head NC/AT; Conjunctivae pink, Sclera anicteric; Oral mucosa moist.  CARDIO:   Regular rate; Regular rhythm  RESP:   No rales, wheezing, or rhonchi appreciated.  GI:   Soft; NT/ND; BS; No guarding; No rebound tenderness.  EXT:   No edema.   SKIN:   Intact.    LAB RESULTS:                        10.2   20.86 )-----------( 198      ( 21 Nov 2021 05:38 )             30.4                       MICROBIOLOGY:    RADIOLOGY:    ALLERGIES:  No Known Allergies      ===========================================================

## 2021-11-22 NOTE — PHYSICAL THERAPY INITIAL EVALUATION ADULT - ADDITIONAL COMMENTS
Pt lives alone in apartment, has 4 steps to enter, was able to ambulate using a rolling walker PTA and required assistance.

## 2021-11-22 NOTE — SWALLOW BEDSIDE ASSESSMENT ADULT - SLP PERTINENT HISTORY OF CURRENT PROBLEM
Pt w/ hx including lupus, DM was adm s/p mechanical fall, sustaining L hip fx.  Pt underwent ORIF.  RN reports lethargy 2' pain medication w/ result that pt was unable to swallow meds yesterday.  RN reports pt tolerated meds in applesauce today.
Pt w/ hx including lupus, DM was adm s/p mechanical fall, sustaining L hip fx.  Pt underwent ORIF.  RN reports lethargy 2' pain medication w/ result that pt was unable to swallow meds yesterday.

## 2021-11-22 NOTE — OCCUPATIONAL THERAPY INITIAL EVALUATION ADULT - PERTINENT HX OF CURRENT PROBLEM, REHAB EVAL
89 y/o female with a PMH of hypothyroidism, hyperlipidemia, osteoarthritis, lupus, and diabetes who presented to the ED after a fall. The patient was walking at home when she tripped and fell, landing on her left hip and side

## 2021-11-22 NOTE — PHYSICAL THERAPY INITIAL EVALUATION ADULT - IMPAIRMENTS CONTRIBUTING IMPAIRED BED MOBILITY, REHAB EVAL
Pt. leaning to the right side, unable to sit at EOB for longer than a few seconds, very poor sitting tolerance and poor body awareness/impaired balance/decreased strength

## 2021-11-22 NOTE — PHYSICAL THERAPY INITIAL EVALUATION ADULT - LEVEL OF INDEPENDENCE: SIT/STAND, REHAB EVAL
Pt. unable to sit at EOB for longer than a few seconds, very poor sitting tolerance and sitting balance; not safe to attempt sit to stand transfers at this time/unable to perform

## 2021-11-22 NOTE — OCCUPATIONAL THERAPY INITIAL EVALUATION ADULT - PLANNED THERAPY INTERVENTIONS, OT EVAL
ADL retraining/balance training/bed mobility training/cognitive, visual perceptual/parent/caregiver training.../ROM/strengthening/stretching/transfer training

## 2021-11-22 NOTE — PROGRESS NOTE ADULT - SUBJECTIVE AND OBJECTIVE BOX
ORTHOPAEDIC SURGERY PROGRESS NOTE    PROCEDURE: L FEMUR INTRAMEDULLARY NAILING    Patient seen and examined at bedside this morning. Pain well controlled. Resting comfortably. No new complaints.    LLE  Distal dressing c/d/i, proximal with moderate strikethrough  Compartments soft, compressible  Firing EHL/FHL/GSC/TA  SILT DPN/SPN/Morfin/S/T  Foot wwp, DP pulse 2+    A&P  89yo F w/ L intertrochanteric hip fracture s/p IMN 11/20/21. Doing well on POD2.  Dressings changed this AM    -WBAT LLE  -DVT ppx  -IS  -pain control  -PT/OT  -please contact ortho with any questions or concerns

## 2021-11-22 NOTE — OCCUPATIONAL THERAPY INITIAL EVALUATION ADULT - IMPAIRED TRANSFERS: BED/CHAIR, REHAB EVAL
poor endurance/impaired balance/cognition/decreased flexibility/pain/decreased ROM/decreased strength

## 2021-11-22 NOTE — OCCUPATIONAL THERAPY INITIAL EVALUATION ADULT - GENERAL OBSERVATIONS, REHAB EVAL
Pt encountered semi paitño in bed, + IV locked, + prima fit, Pt agreeable to bedside OT assessment, may be seen as confirmed with RN. Pt returned to bed as found, + IV locked, + prima fit. RN aware

## 2021-11-22 NOTE — OCCUPATIONAL THERAPY INITIAL EVALUATION ADULT - LEVEL OF INDEPENDENCE: STAND/SIT, REHAB EVAL
Pt unable to achieve lift off from bed following 4 attempts recommend full body lift at this time/unable to perform

## 2021-11-22 NOTE — PROGRESS NOTE ADULT - ASSESSMENT
89 yo F presented s/p mechnical fall. found to have afib with RVR as wel las left intertrochanteric femur fx s/p mechincal fall.    Left intertrochanteric femur fracture 2/2 mechanical fall  - s/p ORIF by ortho   - Pain control as needed  - PT/OT/Physiatry     New onset paroxysmal Afib with RVR, CHADS VASC 3 (AGE/Sex)  - niece Magui is agreeable for AC, discussed risks of bleeding with eliquis  - from ortho POV ok to start ac  - c/w cardizem    HLD  - Continue Atorvastatin     Hypothyroidism  - Continue Synthroid    Leukocytosis   - likely related to fall/surgery  - monitor, no labs available at present momemnt      DVT ppx: SCD's and heparin   GI ppx: Protonix  Activity: Bedrest  Diet: DASH  Dispo: from home     #Progress Note Handoff:  Pending (specify):   possible placement  Family discussion:  Disposition: Home___/SNF___/Other________/Unknown at this time____x____

## 2021-11-22 NOTE — PROGRESS NOTE ADULT - ASSESSMENT
88 yo F presented s/p mechnical fall. found to have afib with RVR as wel las left intertrochanteric femur fx s/p mechincal fall.    #Left intertrochanteric femur fracture 2/2 mechanical fall  #Osteoarthritis  - s/p ORIF by ortho11/20  - Pain control as needed  - PT/OT/Physiatry     #New onset Afib with RVR, CHADS VASC 3 (AGE/Sex)  - Echo: normal EF, moderate to severe dilated LA, mild pulm HTN  - A/C started ; Eliquis 5mg BID     #HLD  - Continue Atorvastatin 10    #Hypothyroidism  - Continue Synthroid      DVT ppx: SCD's and heparin   GI ppx: Protonix  Activity: Bedrest  Diet: DASH  Dispo: from home

## 2021-11-22 NOTE — PROGRESS NOTE ADULT - SUBJECTIVE AND OBJECTIVE BOX
CHIEF COMPLAINT:    Patient is a 89y old  Female who presents with a chief complaint of Fall    INTERVAL HPI/OVERNIGHT EVENTS:    Patient seen and examined at bedside. No acute overnight events occurred.    ROS: Denies pain, SOB. All other systems are negative.    Vital Signs:    T(F): 96.9 (11-22-21 @ 05:47), Max: 96.9 (11-22-21 @ 00:29)  HR: 84 (11-22-21 @ 05:47) (54 - 84)  BP: 116/53 (11-22-21 @ 05:47) (97/52 - 117/71)  RR: 18 (11-22-21 @ 05:47) (16 - 18)  SpO2: 94% (11-21-21 @ 19:31) (94% - 94%)  I&O's Summary    21 Nov 2021 07:01  -  22 Nov 2021 07:00  --------------------------------------------------------  IN: 250 mL / OUT: 800 mL / NET: -550 mL      POCT Blood Glucose.: 175 mg/dL (22 Nov 2021 11:28)  POCT Blood Glucose.: 183 mg/dL (22 Nov 2021 07:53)      PHYSICAL EXAM:  GENERAL:  NAD  SKIN: No rashes or lesions  HEENT: Atraumatic. Normocephalic. Anicteric  NECK:  No JVD.   PULMONARY: Clear to ausculation bilaterally. No wheezing. No rales  CVS: Normal S1, S2. Regular rate and rhythm. No murmurs.  ABDOMEN/GI: Soft, Nontender, Nondistended; Bowel sounds are present  EXTREMITIES:  No edema B/L LE.  NEUROLOGIC:  No motor deficit.  PSYCH: Alert & oriented x 3, normal affect    Consultant(s) Notes Reviewed:  [x ] YES  [ ] NO      LABS:                        10.2   20.86 )-----------( 198      ( 21 Nov 2021 05:38 )             30.4         RADIOLOGY & ADDITIONAL TESTS:      Medications:  Standing  apixaban 5 milliGRAM(s) Oral two times a day  aspirin enteric coated 81 milliGRAM(s) Oral daily  atorvastatin 10 milliGRAM(s) Oral at bedtime  folic acid 1 milliGRAM(s) Oral daily  gabapentin 300 milliGRAM(s) Oral three times a day  levothyroxine 150 MICROGram(s) Oral daily  metoprolol tartrate 25 milliGRAM(s) Oral two times a day  nystatin Cream 1 Application(s) Topical two times a day  pantoprazole    Tablet 40 milliGRAM(s) Oral before breakfast    PRN Meds  bisacodyl Suppository 10 milliGRAM(s) Rectal daily PRN  ketorolac   Injectable 15 milliGRAM(s) IV Push once PRN  magnesium hydroxide Suspension 30 milliLiter(s) Oral daily PRN  ondansetron Injectable 4 milliGRAM(s) IV Push every 6 hours PRN  oxyCODONE    IR 5 milliGRAM(s) Oral every 4 hours PRN      Case discussed with resident  Care discussed with pt

## 2021-11-22 NOTE — PHYSICAL THERAPY INITIAL EVALUATION ADULT - GENERAL OBSERVATIONS, REHAB EVAL
/54, HR 79 bpm. L hand very cold, RN made aware. Will f/u for PT when pt is able to participate.
Pt. encountered alert and NAD, supine in bed (+)Primafit (+)dry dressing intact L hip, agreeable to PT IE. Pt. left as found, bed in chair mode, (+)alarms (+)call bell in reach, NAD. Pt. c/o pain in L hip, unable to quantify numerically due to cognition

## 2021-11-23 LAB
ALBUMIN SERPL ELPH-MCNC: 3 G/DL — LOW (ref 3.5–5.2)
ALP SERPL-CCNC: 93 U/L — SIGNIFICANT CHANGE UP (ref 30–115)
ALT FLD-CCNC: <5 U/L — SIGNIFICANT CHANGE UP (ref 0–41)
ANION GAP SERPL CALC-SCNC: 18 MMOL/L — HIGH (ref 7–14)
ANION GAP SERPL CALC-SCNC: 18 MMOL/L — HIGH (ref 7–14)
APPEARANCE UR: ABNORMAL
AST SERPL-CCNC: 108 U/L — HIGH (ref 0–41)
BACTERIA # UR AUTO: ABNORMAL
BASOPHILS # BLD AUTO: 0.01 K/UL — SIGNIFICANT CHANGE UP (ref 0–0.2)
BASOPHILS NFR BLD AUTO: 0.1 % — SIGNIFICANT CHANGE UP (ref 0–1)
BILIRUB SERPL-MCNC: 0.3 MG/DL — SIGNIFICANT CHANGE UP (ref 0.2–1.2)
BILIRUB UR-MCNC: NEGATIVE — SIGNIFICANT CHANGE UP
BLD GP AB SCN SERPL QL: SIGNIFICANT CHANGE UP
BUN SERPL-MCNC: 64 MG/DL — CRITICAL HIGH (ref 10–20)
BUN SERPL-MCNC: 69 MG/DL — CRITICAL HIGH (ref 10–20)
CALCIUM SERPL-MCNC: 8.1 MG/DL — LOW (ref 8.5–10.1)
CALCIUM SERPL-MCNC: 8.1 MG/DL — LOW (ref 8.5–10.1)
CHLORIDE SERPL-SCNC: 95 MMOL/L — LOW (ref 98–110)
CHLORIDE SERPL-SCNC: 96 MMOL/L — LOW (ref 98–110)
CK SERPL-CCNC: 305 U/L — HIGH (ref 0–225)
CO2 SERPL-SCNC: 19 MMOL/L — SIGNIFICANT CHANGE UP (ref 17–32)
CO2 SERPL-SCNC: 19 MMOL/L — SIGNIFICANT CHANGE UP (ref 17–32)
COLOR SPEC: YELLOW — SIGNIFICANT CHANGE UP
CREAT ?TM UR-MCNC: 130 MG/DL — SIGNIFICANT CHANGE UP
CREAT SERPL-MCNC: 3.7 MG/DL — HIGH (ref 0.7–1.5)
CREAT SERPL-MCNC: 3.7 MG/DL — HIGH (ref 0.7–1.5)
DIFF PNL FLD: ABNORMAL
EOSINOPHIL # BLD AUTO: 0.01 K/UL — SIGNIFICANT CHANGE UP (ref 0–0.7)
EOSINOPHIL NFR BLD AUTO: 0.1 % — SIGNIFICANT CHANGE UP (ref 0–8)
EPI CELLS # UR: 1 /HPF — SIGNIFICANT CHANGE UP (ref 0–5)
GLUCOSE BLDC GLUCOMTR-MCNC: 123 MG/DL — HIGH (ref 70–99)
GLUCOSE BLDC GLUCOMTR-MCNC: 135 MG/DL — HIGH (ref 70–99)
GLUCOSE BLDC GLUCOMTR-MCNC: 158 MG/DL — HIGH (ref 70–99)
GLUCOSE BLDC GLUCOMTR-MCNC: 169 MG/DL — HIGH (ref 70–99)
GLUCOSE SERPL-MCNC: 150 MG/DL — HIGH (ref 70–99)
GLUCOSE SERPL-MCNC: 158 MG/DL — HIGH (ref 70–99)
GLUCOSE UR QL: SIGNIFICANT CHANGE UP
HCT VFR BLD CALC: 21.4 % — LOW (ref 37–47)
HGB BLD-MCNC: 7.1 G/DL — LOW (ref 12–16)
HYALINE CASTS # UR AUTO: 7 /LPF — SIGNIFICANT CHANGE UP (ref 0–7)
IMM GRANULOCYTES NFR BLD AUTO: 1.3 % — HIGH (ref 0.1–0.3)
KETONES UR-MCNC: SIGNIFICANT CHANGE UP
LEUKOCYTE ESTERASE UR-ACNC: ABNORMAL
LYMPHOCYTES # BLD AUTO: 0.51 K/UL — LOW (ref 1.2–3.4)
LYMPHOCYTES # BLD AUTO: 4.3 % — LOW (ref 20.5–51.1)
MAGNESIUM SERPL-MCNC: 1.7 MG/DL — LOW (ref 1.8–2.4)
MCHC RBC-ENTMCNC: 31.3 PG — HIGH (ref 27–31)
MCHC RBC-ENTMCNC: 33.2 G/DL — SIGNIFICANT CHANGE UP (ref 32–37)
MCV RBC AUTO: 94.3 FL — SIGNIFICANT CHANGE UP (ref 81–99)
MONOCYTES # BLD AUTO: 0.9 K/UL — HIGH (ref 0.1–0.6)
MONOCYTES NFR BLD AUTO: 7.6 % — SIGNIFICANT CHANGE UP (ref 1.7–9.3)
NEUTROPHILS # BLD AUTO: 10.34 K/UL — HIGH (ref 1.4–6.5)
NEUTROPHILS NFR BLD AUTO: 86.6 % — HIGH (ref 42.2–75.2)
NITRITE UR-MCNC: POSITIVE
NRBC # BLD: 0 /100 WBCS — SIGNIFICANT CHANGE UP (ref 0–0)
OSMOLALITY UR: 370 MOS/KG — SIGNIFICANT CHANGE UP (ref 50–1200)
PH UR: 5.5 — SIGNIFICANT CHANGE UP (ref 5–8)
PLATELET # BLD AUTO: 169 K/UL — SIGNIFICANT CHANGE UP (ref 130–400)
POTASSIUM SERPL-MCNC: 4.3 MMOL/L — SIGNIFICANT CHANGE UP (ref 3.5–5)
POTASSIUM SERPL-MCNC: 4.4 MMOL/L — SIGNIFICANT CHANGE UP (ref 3.5–5)
POTASSIUM SERPL-SCNC: 4.3 MMOL/L — SIGNIFICANT CHANGE UP (ref 3.5–5)
POTASSIUM SERPL-SCNC: 4.4 MMOL/L — SIGNIFICANT CHANGE UP (ref 3.5–5)
PROT SERPL-MCNC: 4.9 G/DL — LOW (ref 6–8)
PROT UR-MCNC: ABNORMAL
RBC # BLD: 2.27 M/UL — LOW (ref 4.2–5.4)
RBC # FLD: 13.5 % — SIGNIFICANT CHANGE UP (ref 11.5–14.5)
RBC CASTS # UR COMP ASSIST: 3 /HPF — SIGNIFICANT CHANGE UP (ref 0–4)
SODIUM SERPL-SCNC: 132 MMOL/L — LOW (ref 135–146)
SODIUM SERPL-SCNC: 133 MMOL/L — LOW (ref 135–146)
SODIUM UR-SCNC: 42 MMOL/L — SIGNIFICANT CHANGE UP
SP GR SPEC: 1.02 — SIGNIFICANT CHANGE UP (ref 1.01–1.03)
UROBILINOGEN FLD QL: SIGNIFICANT CHANGE UP
WBC # BLD: 11.92 K/UL — HIGH (ref 4.8–10.8)
WBC # FLD AUTO: 11.92 K/UL — HIGH (ref 4.8–10.8)
WBC UR QL: 28 /HPF — HIGH (ref 0–5)

## 2021-11-23 PROCEDURE — 76775 US EXAM ABDO BACK WALL LIM: CPT | Mod: 26

## 2021-11-23 PROCEDURE — 99233 SBSQ HOSP IP/OBS HIGH 50: CPT

## 2021-11-23 RX ORDER — ACETAMINOPHEN 500 MG
650 TABLET ORAL EVERY 6 HOURS
Refills: 0 | Status: DISCONTINUED | OUTPATIENT
Start: 2021-11-23 | End: 2021-12-02

## 2021-11-23 RX ORDER — PANTOPRAZOLE SODIUM 20 MG/1
1 TABLET, DELAYED RELEASE ORAL
Qty: 0 | Refills: 0 | DISCHARGE
Start: 2021-11-23

## 2021-11-23 RX ORDER — OXYCODONE HYDROCHLORIDE 5 MG/1
1 TABLET ORAL
Qty: 0 | Refills: 0 | DISCHARGE
Start: 2021-11-23

## 2021-11-23 RX ORDER — MAGNESIUM SULFATE 500 MG/ML
2 VIAL (ML) INJECTION ONCE
Refills: 0 | Status: COMPLETED | OUTPATIENT
Start: 2021-11-23 | End: 2021-11-23

## 2021-11-23 RX ORDER — METOPROLOL TARTRATE 50 MG
1 TABLET ORAL
Qty: 0 | Refills: 0 | DISCHARGE
Start: 2021-11-23

## 2021-11-23 RX ORDER — APIXABAN 2.5 MG/1
1 TABLET, FILM COATED ORAL
Qty: 0 | Refills: 0 | DISCHARGE
Start: 2021-11-23

## 2021-11-23 RX ORDER — SODIUM CHLORIDE 9 MG/ML
1000 INJECTION INTRAMUSCULAR; INTRAVENOUS; SUBCUTANEOUS
Refills: 0 | Status: DISCONTINUED | OUTPATIENT
Start: 2021-11-23 | End: 2021-11-28

## 2021-11-23 RX ADMIN — GABAPENTIN 300 MILLIGRAM(S): 400 CAPSULE ORAL at 06:51

## 2021-11-23 RX ADMIN — SODIUM CHLORIDE 75 MILLILITER(S): 9 INJECTION INTRAMUSCULAR; INTRAVENOUS; SUBCUTANEOUS at 15:44

## 2021-11-23 RX ADMIN — Medication 81 MILLIGRAM(S): at 12:18

## 2021-11-23 RX ADMIN — APIXABAN 5 MILLIGRAM(S): 2.5 TABLET, FILM COATED ORAL at 06:52

## 2021-11-23 RX ADMIN — NYSTATIN CREAM 1 APPLICATION(S): 100000 CREAM TOPICAL at 06:52

## 2021-11-23 RX ADMIN — PANTOPRAZOLE SODIUM 40 MILLIGRAM(S): 20 TABLET, DELAYED RELEASE ORAL at 06:51

## 2021-11-23 RX ADMIN — Medication 25 MILLIGRAM(S): at 18:10

## 2021-11-23 RX ADMIN — GABAPENTIN 300 MILLIGRAM(S): 400 CAPSULE ORAL at 14:45

## 2021-11-23 RX ADMIN — Medication 25 MILLIGRAM(S): at 06:51

## 2021-11-23 RX ADMIN — Medication 150 MICROGRAM(S): at 06:51

## 2021-11-23 RX ADMIN — Medication 25 GRAM(S): at 12:18

## 2021-11-23 RX ADMIN — NYSTATIN CREAM 1 APPLICATION(S): 100000 CREAM TOPICAL at 17:45

## 2021-11-23 RX ADMIN — Medication 1 MILLIGRAM(S): at 12:18

## 2021-11-23 RX ADMIN — APIXABAN 5 MILLIGRAM(S): 2.5 TABLET, FILM COATED ORAL at 18:10

## 2021-11-23 RX ADMIN — GABAPENTIN 300 MILLIGRAM(S): 400 CAPSULE ORAL at 21:02

## 2021-11-23 RX ADMIN — ATORVASTATIN CALCIUM 10 MILLIGRAM(S): 80 TABLET, FILM COATED ORAL at 21:02

## 2021-11-23 NOTE — PROGRESS NOTE ADULT - ASSESSMENT
88 yo F presented s/p mechnical fall. found to have afib with RVR as wel las left intertrochanteric femur fx s/p mechincal fall.    #Left intertrochanteric femur fracture 2/2 mechanical fall  #Osteoarthritis  - s/p ORIF by ortho 11/20  - Pain control as needed: Oxy IR 6mg q4PRN  - PT/OT/Physiatry     #New onset Afib with RVR, CHADS VASC 3 (AGE/Sex)  - Echo: normal EF, moderate to severe dilated LA, mild pulm HTN  - A/C started ; Eliquis 5mg BID     #HLD  - Continue Atorvastatin 10    #Hypothyroidism  - Continue Synthroid      DVT ppx: SCD's and heparin   GI ppx: Protonix  Activity: Bedrest  Diet: DASH  Dispo: from home, pending auth from [facility]   89 yo F presented s/p mechnical fall. found to have afib with RVR as wel las left intertrochanteric femur fx s/p mechincal fall.    #Left intertrochanteric femur fracture 2/2 mechanical fall  #Osteoarthritis  - s/p ORIF by ortho 11/20  - Pain control as needed: Oxy IR 6mg q4PRN  - PT/OT/Physiatry     #New onset Afib with RVR, CHADS VASC 3 (AGE/Sex)  - Echo: normal EF, moderate to severe dilated LA, mild pulm HTN  - A/C started ; Eliquis 5mg BID     #HLD  - Continue Atorvastatin 10    #Hypothyroidism  - Continue Synthroid    DVT ppx: Eliquis  GI ppx: Protonix  Activity: Bedrest  Diet: DASH  Dispo: from home, pending auth. from [facility]   88 yo F presented s/p mechnical fall. found to have afib with RVR as wel las left intertrochanteric femur fx s/p mechincal fall.    #Left intertrochanteric femur fracture 2/2 mechanical fall  #Osteoarthritis  - s/p ORIF by ortho 11/20  - Pain control as needed: Oxy IR 6mg q4PRN  - PT/OT/Physiatry     #New onset Afib with RVR, CHADS VASC 3 (AGE/Sex)  - Echo: normal EF, moderate to severe dilated LA, mild pulm HTN  - A/C started ; Eliquis 5mg BID     #SAVANNAH  - Cr baseline .9 > 11/23 Cr 3.7  - f/u urine lytes, UA    #HLD  - Continue Atorvastatin 10    #Hypothyroidism  - Continue Synthroid    DVT ppx: Eliquis  GI ppx: Protonix  Activity: Bedrest  Diet: DASH  Dispo: from home, pending auth. from [facility]   88 yo F presented s/p mechnical fall. found to have afib with RVR as wel las left intertrochanteric femur fx s/p mechincal fall.    #Left intertrochanteric femur fracture 2/2 mechanical fall  #Osteoarthritis  - s/p ORIF by ortho 11/20  - Pain control as needed: Oxy IR 6mg q4PRN  - PT/OT/Physiatry     #New onset Afib with RVR, CHADS VASC 3 (AGE/Sex)  - Echo: normal EF, moderate to severe dilated LA, mild pulm HTN  - A/C started ; Eliquis 5mg BID     #SAVANNAH  - Cr baseline .9 > 11/23 Cr 3.7  - f/u urine lytes, UA  - d/c'd toradol in the setting of AKA   - f/u repeat BMP 4pm    #HLD  - Continue Atorvastatin 10    #Hypothyroidism  - Continue Synthroid    DVT ppx: Eliquis  GI ppx: Protonix  Activity: Bedrest  Diet: DASH  Dispo: from home, pending auth. from [facility]   88 yo F presented s/p mechnical fall. found to have afib with RVR as wel las left intertrochanteric femur fx s/p mechincal fall.    #Left intertrochanteric femur fracture 2/2 mechanical fall  #Osteoarthritis  - s/p ORIF by ortho 11/20  - Pain control as needed: Oxy IR 6mg q4PRN  - PT/OT/Physiatry     #New onset Afib with RVR, CHADS VASC 3 (AGE/Sex)  - Echo: normal EF, moderate to severe dilated LA, mild pulm HTN  - A/C started ; Eliquis 5mg BID     #SAVANNAH likely obstructive nephropathy  - Cr baseline .9 > 11/23 Cr 3.7  - f/u urine lytes, UA  - d/c'd toradol in the setting of SAVANNAH  - Per RN ~700cc urine on bladder scan, will have RN place lenz  - f/u repeat BMP 4pm    #HLD  - Continue Atorvastatin 10    #Hypothyroidism  - Continue Synthroid    DVT ppx: Eliquis  GI ppx: Protonix  Activity: Bedrest  Diet: DASH  Dispo: from home, pending auth. from [facility]   88 yo F presented s/p mechnical fall. found to have afib with RVR as wel las left intertrochanteric femur fx s/p mechincal fall.    #Left intertrochanteric femur fracture 2/2 mechanical fall  #Osteoarthritis  - s/p ORIF by ortho 11/20  - Pain control as needed: Oxy IR 6mg q4PRN  - PT/OT/Physiatry     #New onset Afib with RVR, CHADS VASC 3 (AGE/Sex)  - Echo: normal EF, moderate to severe dilated LA, mild pulm HTN  - A/C started ; Eliquis 5mg BID     #SAVANNAH likely obstructive nephropathy  - Cr baseline .9 > 11/23 Cr 3.7  - f/u urine lytes, UA  - d/c'd toradol in the setting of SAVANNAH  - Per RN ~700cc urine on bladder scan, will have RN place lenz  - f/u repeat BMP 8pm    #HLD  - Continue Atorvastatin 10    #Hypothyroidism  - Continue Synthroid    DVT ppx: Eliquis  GI ppx: Protonix  Activity: Bedrest  Diet: DASH  Dispo: from home, pending auth. from [facility]   88 yo F presented s/p mechnical fall. found to have afib with RVR as wel las left intertrochanteric femur fx s/p mechincal fall.    #Left intertrochanteric femur fracture 2/2 mechanical fall  #Osteoarthritis  - s/p ORIF by ortho 11/20  - Pain control as needed: Oxy IR 6mg q4PRN  - PT/OT/Physiatry     #New onset Afib with RVR, CHADS VASC 3 (AGE/Sex)  - Echo: normal EF, moderate to severe dilated LA, mild pulm HTN  - A/C started ; Eliquis 5mg BID     #SAVANNAH likely obstructive nephropathy  - Cr baseline .9 > 11/23 Cr 3.7  - f/u urine lytes, UA  - d/c'd toradol in the setting of SAVANNAH  - Per RN ~700cc urine on bladder scan, will have RN place lenz  - f/u repeat BMP and CK 4pm    #Anemia  - suspect blood loss anemia in postop patient  - Hgb 12.1 on admission > 11/23 7.1  - f/u 4pm CBC    #HLD  - Continue Atorvastatin 10    #Hypothyroidism  - Continue Synthroid    DVT ppx: Eliquis  GI ppx: Protonix  Activity: Bedrest  Diet: DASH  Dispo: from home, pending auth. from [facility]   88 yo F presented s/p mechnical fall. found to have afib with RVR as wel las left intertrochanteric femur fx s/p mechincal fall.    #Left intertrochanteric femur fracture 2/2 mechanical fall  #Osteoarthritis  - s/p ORIF by ortho 11/20  - Pain control as needed: Oxy IR 6mg q4PRN  - PT/OT/Physiatry     #New onset Afib with RVR, CHADS VASC 3 (AGE/Sex)  - Echo: normal EF, moderate to severe dilated LA, mild pulm HTN  - A/C started ; Eliquis 5mg BID     #SAVANNAH likely obstructive nephropathy  - Cr baseline .9 > 11/23 Cr 3.7  - f/u urine lytes, UA  - d/c'd toradol in the setting of SAVANNAH  - Per RN ~700cc urine on bladder scan, will have RN place lenz  - f/u repeat BMP and CK 4pm, Renal US    #Anemia  - suspect blood loss anemia in postop patient  - Hgb 12.1 on admission > 11/23 7.1  - f/u 4pm CBC    #HLD  - Continue Atorvastatin 10    #Hypothyroidism  - Continue Synthroid    DVT ppx: Eliquis  GI ppx: Protonix  Activity: Bedrest  Diet: DASH  Dispo: from home, pending auth. from [facility]

## 2021-11-23 NOTE — PROGRESS NOTE ADULT - SUBJECTIVE AND OBJECTIVE BOX
MELANIA TRISTAN 89y Female  MRN#: [id number]   Hospital Day: 4d    HPI:  The patient is an 88 y/o female with a PMH of hypothyroidism, hyperlipidemia, osteoarthritis, lupus, and diabetes who presented to the ED after a fall. The patient was walking at home when she tripped and fell, landing on her left hip and side. She states that she was unable to get up after this happened. She denies losing consciousness. She is not any anticoagulation. The patient lives by herself  and ambulates with a walker at home at baseline. She reports that she is able to do most of her ADL's independently. She denies any other associated symptoms.    In the ED the patient's vitals were T98 /82 HR 64 SpO2 94% on RA. While in the ED the patient was noted to have tachycardia. An EKG was performed which showed that the patient was in new onset Afib with RVR. She was given one dose of Cardizem 10 IV which controlled the rate.      (19 Nov 2021 08:42)      SUBJECTIVE  Patient is a 89y old Female who presents with a chief complaint of Fall (23 Nov 2021 07:25)  Currently admitted to medicine with the primary diagnosis of Intertrochanteric fracture      INTERVAL HPI AND OVERNIGHT EVENTS:  Patient was examined and seen at bedside. This morning she is resting comfortably in bed and reports no issues or overnight events.    OBJECTIVE  PAST MEDICAL & SURGICAL HISTORY  DM (diabetes mellitus)    Osteoarthritis    Hypothyroidism    Lupus    Hyperlipidemia    History of hysterectomy    History of cholecystectomy    History of lumpectomy      ALLERGIES:  No Known Allergies    MEDICATIONS:  STANDING MEDICATIONS  apixaban 5 milliGRAM(s) Oral two times a day  aspirin enteric coated 81 milliGRAM(s) Oral daily  atorvastatin 10 milliGRAM(s) Oral at bedtime  folic acid 1 milliGRAM(s) Oral daily  gabapentin 300 milliGRAM(s) Oral three times a day  levothyroxine 150 MICROGram(s) Oral daily  metoprolol tartrate 25 milliGRAM(s) Oral two times a day  nystatin Cream 1 Application(s) Topical two times a day  pantoprazole    Tablet 40 milliGRAM(s) Oral before breakfast    PRN MEDICATIONS  bisacodyl Suppository 10 milliGRAM(s) Rectal daily PRN  ketorolac   Injectable 15 milliGRAM(s) IV Push once PRN  magnesium hydroxide Suspension 30 milliLiter(s) Oral daily PRN  ondansetron Injectable 4 milliGRAM(s) IV Push every 6 hours PRN  oxyCODONE    IR 5 milliGRAM(s) Oral every 4 hours PRN      VITAL SIGNS: Last 24 Hours  T(C): 36.2 (22 Nov 2021 21:57), Max: 36.2 (22 Nov 2021 21:57)  T(F): 97.2 (22 Nov 2021 21:57), Max: 97.2 (22 Nov 2021 21:57)  HR: 67 (23 Nov 2021 06:49) (67 - 73)  BP: 117/53 (23 Nov 2021 06:49) (94/40 - 133/61)  BP(mean): --  RR: 19 (23 Nov 2021 06:49) (18 - 19)  SpO2: 96% (23 Nov 2021 06:49) (96% - 96%)    LABS:                        7.1    11.92 )-----------( 169      ( 23 Nov 2021 06:26 )             21.4     11-23    133<L>  |  96<L>  |  64<HH>  ----------------------------<  158<H>  4.3   |  19  |  3.7<H>    Ca    8.1<L>      23 Nov 2021 06:26  Mg     1.7     11-23    TPro  4.9<L>  /  Alb  3.0<L>  /  TBili  0.3  /  DBili  x   /  AST  108<H>  /  ALT  <5  /  AlkPhos  93  11-23      RADIOLOGY: No New Rad      PHYSICAL EXAM:  CONSTITUTIONAL: No acute distress, well-developed, well-groomed, AAOx3  HEAD: Atraumatic, normocephalic  EYES: EOM intact, PERRL, conjunctiva and sclera clear  ENT: Supple, moist mucous membranes  PULMONARY: Clear to auscultation bilaterally; no wheezes, rales, or rhonchi  CARDIOVASCULAR: Regular rate; no murmurs, rubs, or gallops  GASTROINTESTINAL: Soft, non-tender, non-distended; bowel sounds present  MUSCULOSKELETAL: 2+ peripheral pulses  NEUROLOGY: moves all extremities, follows commands  SKIN: warm and dry   MELANIA TRISTAN 89y Female  MRN#: [id number]   Hospital Day: 4d    HPI:  The patient is an 89 y/o female with a PMH of hypothyroidism, hyperlipidemia, osteoarthritis, lupus, and diabetes who presented to the ED after a fall. The patient was walking at home when she tripped and fell, landing on her left hip and side. She states that she was unable to get up after this happened. She denies losing consciousness. She is not any anticoagulation. The patient lives by herself  and ambulates with a walker at home at baseline. She reports that she is able to do most of her ADL's independently. She denies any other associated symptoms.    In the ED the patient's vitals were T98 /82 HR 64 SpO2 94% on RA. While in the ED the patient was noted to have tachycardia. An EKG was performed which showed that the patient was in new onset Afib with RVR. She was given one dose of Cardizem 10 IV which controlled the rate.      (19 Nov 2021 08:42)      SUBJECTIVE  Patient is a 89y old Female who presents with a chief complaint of Fall (23 Nov 2021 07:25)  Currently admitted to medicine with the primary diagnosis of Intertrochanteric fracture      INTERVAL HPI AND OVERNIGHT EVENTS:  Patient was examined and seen at bedside. This morning she is resting comfortably in bed and reports no issues or overnight events.    OBJECTIVE  PAST MEDICAL & SURGICAL HISTORY  DM (diabetes mellitus)    Osteoarthritis    Hypothyroidism    Lupus    Hyperlipidemia    History of hysterectomy    History of cholecystectomy    History of lumpectomy      ALLERGIES:  No Known Allergies    MEDICATIONS:  STANDING MEDICATIONS  apixaban 5 milliGRAM(s) Oral two times a day  aspirin enteric coated 81 milliGRAM(s) Oral daily  atorvastatin 10 milliGRAM(s) Oral at bedtime  folic acid 1 milliGRAM(s) Oral daily  gabapentin 300 milliGRAM(s) Oral three times a day  levothyroxine 150 MICROGram(s) Oral daily  metoprolol tartrate 25 milliGRAM(s) Oral two times a day  nystatin Cream 1 Application(s) Topical two times a day  pantoprazole    Tablet 40 milliGRAM(s) Oral before breakfast    PRN MEDICATIONS  bisacodyl Suppository 10 milliGRAM(s) Rectal daily PRN  ketorolac   Injectable 15 milliGRAM(s) IV Push once PRN  magnesium hydroxide Suspension 30 milliLiter(s) Oral daily PRN  ondansetron Injectable 4 milliGRAM(s) IV Push every 6 hours PRN  oxyCODONE    IR 5 milliGRAM(s) Oral every 4 hours PRN      VITAL SIGNS: Last 24 Hours  T(C): 36.2 (22 Nov 2021 21:57), Max: 36.2 (22 Nov 2021 21:57)  T(F): 97.2 (22 Nov 2021 21:57), Max: 97.2 (22 Nov 2021 21:57)  HR: 67 (23 Nov 2021 06:49) (67 - 73)  BP: 117/53 (23 Nov 2021 06:49) (94/40 - 133/61)  BP(mean): --  RR: 19 (23 Nov 2021 06:49) (18 - 19)  SpO2: 96% (23 Nov 2021 06:49) (96% - 96%)    LABS:                        7.1    11.92 )-----------( 169      ( 23 Nov 2021 06:26 )             21.4     11-23    133<L>  |  96<L>  |  64<HH>  ----------------------------<  158<H>  4.3   |  19  |  3.7<H>    Ca    8.1<L>      23 Nov 2021 06:26  Mg     1.7     11-23    TPro  4.9<L>  /  Alb  3.0<L>  /  TBili  0.3  /  DBili  x   /  AST  108<H>  /  ALT  <5  /  AlkPhos  93  11-23    Creatinine, Serum: 0.7 mg/dL (11.19.21 @ 21:00)    RADIOLOGY: No New Rad      PHYSICAL EXAM:  CONSTITUTIONAL: No acute distress, well-developed, well-groomed, AAOx3  HEAD: Atraumatic, normocephalic  EYES: EOM intact, PERRL, conjunctiva and sclera clear  ENT: Supple, moist mucous membranes  PULMONARY: Clear to auscultation bilaterally; no wheezes, rales, or rhonchi  CARDIOVASCULAR: Regular rate; no murmurs, rubs, or gallops  GASTROINTESTINAL: Soft, non-tender, non-distended; bowel sounds present  MUSCULOSKELETAL: 2+ peripheral pulses  NEUROLOGY: moves all extremities, follows commands  SKIN: warm and dry

## 2021-11-23 NOTE — DISCHARGE NOTE PROVIDER - CARE PROVIDER_API CALL
Wesly Staples)  Orthopaedic Surgery  [address] [place] [address]  Phone: [phone]  Fax: [phone]  Established Patient  Follow Up Time: 1 month

## 2021-11-23 NOTE — PROGRESS NOTE ADULT - SUBJECTIVE AND OBJECTIVE BOX
ORTHOPAEDIC SURGERY PROGRESS NOTE    PROCEDURE: L FEMUR INTRAMEDULLARY NAILING    Patient seen and examined at bedside this morning. Pain well controlled. Resting comfortably. No new complaints.    LLE  Distal dressing c/i with moderate strikethrough, removed and replaced by ortho  Incisions c/d/i with staples in place  Firing EHL/FHL/GSC/TA  SILT DPN/SPN/Morfin/S/T  Foot wwp, DP pulse 2+    A&P  88yo F w/ L intertrochanteric hip fracture s/p IMN 11/20/21. Doing well on POD3.  -WBAT LLE  -DVT ppx  -IS  -pain control  -PT/OT  -please contact ortho with any questions or concerns

## 2021-11-23 NOTE — PROGRESS NOTE ADULT - SUBJECTIVE AND OBJECTIVE BOX
CHIEF COMPLAINT:    Patient is a 89y old  Female who presents with a chief complaint of Fall     INTERVAL HPI/OVERNIGHT EVENTS:    Patient seen and examined at bedside. No acute overnight events occurred.    ROS: Denies SOB, chest pain. All other systems are negative.    Vital Signs:    T(F): 98.1 (11-23-21 @ 12:48), Max: 98.1 (11-23-21 @ 12:48)  HR: 59 (11-23-21 @ 12:48) (59 - 68)  BP: 130/60 (11-23-21 @ 12:48) (117/53 - 133/61)  RR: 18 (11-23-21 @ 12:48) (18 - 19)  SpO2: 96% (11-23-21 @ 06:49) (96% - 96%)  I&O's Summary    Daily     Daily   CAPILLARY BLOOD GLUCOSE      POCT Blood Glucose.: 135 mg/dL (23 Nov 2021 16:44)  POCT Blood Glucose.: 158 mg/dL (23 Nov 2021 11:20)  POCT Blood Glucose.: 169 mg/dL (23 Nov 2021 07:21)  POCT Blood Glucose.: 175 mg/dL (22 Nov 2021 22:01)      PHYSICAL EXAM:  GENERAL:  NAD  SKIN: No rashes or lesions  HEENT: Atraumatic. Normocephalic. Anicteric  NECK:  No JVD.   PULMONARY: Clear to ausculation bilaterally. No wheezing. No rales  CVS: Normal S1, S2. Regular rate and rhythm. No murmurs.  ABDOMEN/GI: Soft, Nontender, Nondistended; Bowel sounds are present  EXTREMITIES:  No edema B/L LE.  NEUROLOGIC:  No motor deficit.  PSYCH: Alert & oriented x 3, normal affect    Consultant(s) Notes Reviewed:  [x ] YES  [ ] NO  Care Discussed with Consultants/Other Providers [ x] YES  [ ] NO    LABS:                        7.1    11.92 )-----------( 169      ( 23 Nov 2021 06:26 )             21.4     11-23    133<L>  |  96<L>  |  64<HH>  ----------------------------<  158<H>  4.3   |  19  |  3.7<H>    Ca    8.1<L>      23 Nov 2021 06:26  Mg     1.7     11-23    TPro  4.9<L>  /  Alb  3.0<L>  /  TBili  0.3  /  DBili  x   /  AST  108<H>  /  ALT  <5  /  AlkPhos  93  11-23              RADIOLOGY & ADDITIONAL TESTS:      Medications:  Standing  apixaban 5 milliGRAM(s) Oral two times a day  aspirin enteric coated 81 milliGRAM(s) Oral daily  atorvastatin 10 milliGRAM(s) Oral at bedtime  folic acid 1 milliGRAM(s) Oral daily  gabapentin 300 milliGRAM(s) Oral three times a day  levothyroxine 150 MICROGram(s) Oral daily  metoprolol tartrate 25 milliGRAM(s) Oral two times a day  nystatin Cream 1 Application(s) Topical two times a day  pantoprazole    Tablet 40 milliGRAM(s) Oral before breakfast  sodium chloride 0.9%. 1000 milliLiter(s) IV Continuous <Continuous>    PRN Meds  acetaminophen     Tablet .. 650 milliGRAM(s) Oral every 6 hours PRN  bisacodyl Suppository 10 milliGRAM(s) Rectal daily PRN  magnesium hydroxide Suspension 30 milliLiter(s) Oral daily PRN  ondansetron Injectable 4 milliGRAM(s) IV Push every 6 hours PRN  oxyCODONE    IR 5 milliGRAM(s) Oral every 4 hours PRN      Case discussed with resident  Care discussed with pt

## 2021-11-23 NOTE — PROGRESS NOTE ADULT - ASSESSMENT
89 yo F presented s/p mechnical fall. found to have afib with RVR as wel las left intertrochanteric femur fx s/p mechincal fall. Course complicated by SAVANNAH    SAVANNAH  - pt retained 700cc urine, likely from anethesia and immobility  - straight cath as per policy then lenz if no free urination  - monitor SCr    Left intertrochanteric femur fracture 2/2 mechanical fall  - s/p ORIF by ortho   - Pain control as needed  - PT/OT/Physiatry     New onset paroxysmal Afib with RVR, CHADS VASC 3 (AGE/Sex)  - niece Magui is agreeable for AC, discussed risks of bleeding with eliquis  - from ortho POV ok to start ac  - c/w cardizem    HLD  - Continue Atorvastatin     Hypothyroidism  - Continue Synthroid    Leukocytosis   - improved, likely reactive from surgery      DVT ppx: SCD's and heparin   GI ppx: Protonix  Activity: Bedrest  Diet: DASH  Dispo: from home     #Progress Note Handoff:  Pending (specify):   SAVANNAH resolution  Family discussion: discussed SAVANNAH with pt  Disposition: Home___/SNF___/Other________/Unknown at this time____x____

## 2021-11-23 NOTE — DISCHARGE NOTE PROVIDER - HOSPITAL COURSE
The patient is an 88 y/o female with a PMH of hypothyroidism, hyperlipidemia, osteoarthritis, lupus, and diabetes who presented to the ED after a fall. The patient was walking at home when she tripped and fell, landing on her left hip and side. She states that she was unable to get up after this happened. She denies losing consciousness. She is not any anticoagulation. The patient lives by herself  and ambulates with a walker at home at baseline. She reports that she is able to do most of her ADL's independently. She denies any other associated symptoms.    In the ED the patient's vitals were T98 /82 HR 64 SpO2 94% on RA. While in the ED the patient was noted to have tachycardia. An EKG was performed which showed that the patient was in new onset Afib with RVR. She was given one dose of Cardizem 10 IV which controlled the rate.       While on 3a, the patient received a ORIF for a Left intertrochanteric femur fracture and was given pain control with Oxycodone and Ketorolac. The patient was also found to have A. Fib with RVR and was started on Eliquis 5mg BID. An Echo was done showing normal EF, mod. dilation of LA, and mild pulm HTN. Her chronic conditions were managed with her home medications. The patient is an 88 y/o female with a PMH of hypothyroidism, hyperlipidemia, osteoarthritis, lupus, and diabetes who presented to the ED after a fall. The patient was walking at home when she tripped and fell, landing on her left hip and side. She states that she was unable to get up after this happened. She denies losing consciousness. She is not any anticoagulation. The patient lives by herself  and ambulates with a walker at home at baseline. She reports that she is able to do most of her ADL's independently. She denies any other associated symptoms.    In the ED the patient's vitals were T98 /82 HR 64 SpO2 94% on RA. While in the ED the patient was noted to have tachycardia. An EKG was performed which showed that the patient was in new onset Afib with RVR. She was given one dose of Cardizem 10 IV which controlled the rate.       While on 3a, the patient received a ORIF for a Left intertrochanteric femur fracture and was given pain control with Oxycodone and Ketorolac. The patient was also found to have A. Fib with RVR and was started on Eliquis 5mg BID, however the patient had a acute hemoglobin drop for which she received 2 u pRBCs and her Eliquis was held, a repeat CT abd/pelvis showed no RP bleed. the patient was monitored off eliquis but was restarted as hemoglobin was stable. An Echo was done showing normal EF, mod. dilation of LA, and mild pulm HTN. Her chronic conditions were managed with her home medications.

## 2021-11-23 NOTE — DISCHARGE NOTE PROVIDER - NSDCMRMEDTOKEN_GEN_ALL_CORE_FT
apixaban 5 mg oral tablet: 1 tab(s) orally 2 times a day  Aspir 81 oral delayed release tablet: 1 tab(s) orally once a day  atorvastatin 10 mg oral tablet: 1 tab(s) orally once a day  folic acid 1 mg oral tablet: 1 tab(s) orally once a day  gabapentin 300 mg oral capsule: 1 cap(s) orally 3 times a day  Metoprolol Tartrate 25 mg oral tablet: 1 tab(s) orally 2 times a day  oxyCODONE 5 mg oral tablet: 1 tab(s) orally every 4 hours, As needed, Mild Pain (1 - 3)  pantoprazole 40 mg oral delayed release tablet: 1 tab(s) orally once a day (before a meal)  Synthroid 150 mcg (0.15 mg) oral tablet: 1 tab(s) orally once a day   apixaban 5 mg oral tablet: 1 tab(s) orally 2 times a day  Aspir 81 oral delayed release tablet: 1 tab(s) orally once a day  atorvastatin 10 mg oral tablet: 1 tab(s) orally once a day (at bedtime)  folic acid 1 mg oral tablet: 1 tab(s) orally once a day  gabapentin 300 mg oral capsule: 1 cap(s) orally 3 times a day  Metoprolol Tartrate 25 mg oral tablet: 1 tab(s) orally 2 times a day  oxyCODONE 5 mg oral tablet: 1 tab(s) orally every 4 hours, As needed, Mild Pain (1 - 3)  pantoprazole 40 mg oral delayed release tablet: 1 tab(s) orally once a day (before a meal)  Synthroid 150 mcg (0.15 mg) oral tablet: 1 tab(s) orally once a day

## 2021-11-23 NOTE — DISCHARGE NOTE PROVIDER - NSDCCPCAREPLAN_GEN_ALL_CORE_FT
PRINCIPAL DISCHARGE DIAGNOSIS  Diagnosis: Intertrochanteric fracture  Assessment and Plan of Treatment: 1. Pain Control: Oxy IR 5mg, gabapentin 300mg TID  2. WBAT LLE  3. Eliquis for newly diagnosed A Fib w/ RVR  4. Physical Therapy  5. Follow up with Dr. Staples as outpatient in 10-14 days after discharge from the hospital or rehab. Call office for appointment.  6. Staples/sutures to be removed Post-Op Day 14, and repeat x-rays as needed.  7. Ice/Elevate affected area as needed.  8. Keep dressing/splint/cast clean and dry  For your newly diagnosed Atrial Fibrillation which is a irregularly fast heart rhythm which predisposes patients to developing clots and having stroke.  Continue Metoprolol for rate control.   Continue medication for stroke prevention called Eliquis take 5mg twice daily lifelong       PRINCIPAL DISCHARGE DIAGNOSIS  Diagnosis: Intertrochanteric fracture  Assessment and Plan of Treatment: 1. Pain Control: Oxy IR 5mg, gabapentin 300mg TID  2. WBAT LLE  3. Eliquis for newly diagnosed A Fib w/ RVR  4. Physical Therapy  5. Follow up with Dr. Staples as outpatient in 10-14 days after discharge from the hospital or rehab. Call office for appointment.  6. Staples/sutures to be removed Post-Op Day 14, and repeat x-rays as needed.  7. Ice/Elevate affected area as needed.  8. Keep dressing/splint/cast clean and dry  For your newly diagnosed Atrial Fibrillation which is a irregularly fast heart rhythm which predisposes patients to developing clots and having stroke.  Continue Metoprolol for rate control.   Continue medication for stroke prevention called Eliquis take 5mg twice daily lifelong      SECONDARY DISCHARGE DIAGNOSES  Diagnosis: Atrial fibrillation with RVR  Assessment and Plan of Treatment:     Diagnosis: Hypothyroidism  Assessment and Plan of Treatment:      PRINCIPAL DISCHARGE DIAGNOSIS  Diagnosis: Intertrochanteric fracture  Assessment and Plan of Treatment: 1. Pain Control: Oxy IR 5mg, gabapentin 300mg TID  2. WBAT LLE  3. Eliquis for newly diagnosed A Fib w/ RVR  4. Physical Therapy  5. Follow up with Dr. Staples as outpatient in 10-14 days after discharge from the hospital or rehab. Call office for appointment.  6. Staples/sutures to be removed Post-Op Day 14, and repeat x-rays as needed.  7. Ice/Elevate affected area as needed.  8. Keep dressing/splint/cast clean and dry  For your newly diagnosed Atrial Fibrillation which is a irregularly fast heart rhythm which predisposes patients to developing clots and having stroke.  Continue Metoprolol for rate control.   Continue medication for stroke prevention called Eliquis take 5mg twice daily lifelong      SECONDARY DISCHARGE DIAGNOSES  Diagnosis: Atrial fibrillation with RVR  Assessment and Plan of Treatment: Atrial Fibrillation  Atrial fibrillation is a type of irregular heartbeat (arrhythmia) where the heart quivers continuously in a chaotic pattern that makes the heart unable to pump blood normally. This can increase the risk for stroke, heart failure, and other heart-related conditions. Atrial fibrillation can be caused by a variety of conditions and may be temporary, intermittent, or permanent. Symptoms include feeling that your heart is beating rapidly or irregularly, chest discomfort, shortness of breath, or dizziness/lightheadedness that may be worse with exertion. Treatment is varied but may involve medication or electrical shock (cardioversion).  SEEK IMMEDIATE MEDICAL CARE IF YOU HAVE ANY OF THE FOLLOWING SYMPTOMS: chest pain, shortness of breath, abdominal pain, sweating, vomiting, blood in vomit/bowel movements/urine, dizziness/lightheadedness, weakness or numbness to face/arm/leg, trouble speaking or understanding, facial droop.      Diagnosis: Hypothyroidism  Assessment and Plan of Treatment:

## 2021-11-23 NOTE — DISCHARGE NOTE PROVIDER - NSDCFUADDAPPT_GEN_ALL_CORE_FT
Please followup with your primary care physician and establish care with Cardiologist for your Atrial Fibrillation within 1-2 weeks of discharge to ensure appropriate treatment response

## 2021-11-24 LAB
ALBUMIN SERPL ELPH-MCNC: 2.8 G/DL — LOW (ref 3.5–5.2)
ALP SERPL-CCNC: 94 U/L — SIGNIFICANT CHANGE UP (ref 30–115)
ALT FLD-CCNC: <5 U/L — SIGNIFICANT CHANGE UP (ref 0–41)
ANION GAP SERPL CALC-SCNC: 15 MMOL/L — HIGH (ref 7–14)
AST SERPL-CCNC: 63 U/L — HIGH (ref 0–41)
BASOPHILS # BLD AUTO: 0 K/UL — SIGNIFICANT CHANGE UP (ref 0–0.2)
BASOPHILS # BLD AUTO: 0.01 K/UL — SIGNIFICANT CHANGE UP (ref 0–0.2)
BASOPHILS NFR BLD AUTO: 0 % — SIGNIFICANT CHANGE UP (ref 0–1)
BASOPHILS NFR BLD AUTO: 0.1 % — SIGNIFICANT CHANGE UP (ref 0–1)
BILIRUB SERPL-MCNC: 0.4 MG/DL — SIGNIFICANT CHANGE UP (ref 0.2–1.2)
BUN SERPL-MCNC: 71 MG/DL — CRITICAL HIGH (ref 10–20)
CALCIUM SERPL-MCNC: 8.3 MG/DL — LOW (ref 8.5–10.1)
CHLORIDE SERPL-SCNC: 97 MMOL/L — LOW (ref 98–110)
CO2 SERPL-SCNC: 19 MMOL/L — SIGNIFICANT CHANGE UP (ref 17–32)
CREAT SERPL-MCNC: 3.3 MG/DL — HIGH (ref 0.7–1.5)
EOSINOPHIL # BLD AUTO: 0.03 K/UL — SIGNIFICANT CHANGE UP (ref 0–0.7)
EOSINOPHIL # BLD AUTO: 0.04 K/UL — SIGNIFICANT CHANGE UP (ref 0–0.7)
EOSINOPHIL # BLD AUTO: 0.04 K/UL — SIGNIFICANT CHANGE UP (ref 0–0.7)
EOSINOPHIL # BLD AUTO: 0.05 K/UL — SIGNIFICANT CHANGE UP (ref 0–0.7)
EOSINOPHIL NFR BLD AUTO: 0.3 % — SIGNIFICANT CHANGE UP (ref 0–8)
EOSINOPHIL NFR BLD AUTO: 0.5 % — SIGNIFICANT CHANGE UP (ref 0–8)
EOSINOPHIL NFR BLD AUTO: 0.5 % — SIGNIFICANT CHANGE UP (ref 0–8)
EOSINOPHIL NFR BLD AUTO: 0.7 % — SIGNIFICANT CHANGE UP (ref 0–8)
GLUCOSE BLDC GLUCOMTR-MCNC: 128 MG/DL — HIGH (ref 70–99)
GLUCOSE BLDC GLUCOMTR-MCNC: 129 MG/DL — HIGH (ref 70–99)
GLUCOSE BLDC GLUCOMTR-MCNC: 138 MG/DL — HIGH (ref 70–99)
GLUCOSE BLDC GLUCOMTR-MCNC: 139 MG/DL — HIGH (ref 70–99)
GLUCOSE SERPL-MCNC: 128 MG/DL — HIGH (ref 70–99)
HCT VFR BLD CALC: 19.6 % — LOW (ref 37–47)
HCT VFR BLD CALC: 20.4 % — LOW (ref 37–47)
HCT VFR BLD CALC: 20.9 % — LOW (ref 37–47)
HCT VFR BLD CALC: 24.4 % — LOW (ref 37–47)
HGB BLD-MCNC: 6.6 G/DL — CRITICAL LOW (ref 12–16)
HGB BLD-MCNC: 6.8 G/DL — CRITICAL LOW (ref 12–16)
HGB BLD-MCNC: 7.1 G/DL — LOW (ref 12–16)
HGB BLD-MCNC: 8 G/DL — LOW (ref 12–16)
IMM GRANULOCYTES NFR BLD AUTO: 0.4 % — HIGH (ref 0.1–0.3)
IMM GRANULOCYTES NFR BLD AUTO: 0.4 % — HIGH (ref 0.1–0.3)
IMM GRANULOCYTES NFR BLD AUTO: 0.8 % — HIGH (ref 0.1–0.3)
IMM GRANULOCYTES NFR BLD AUTO: 0.9 % — HIGH (ref 0.1–0.3)
IRON SATN MFR SERPL: 22 % — SIGNIFICANT CHANGE UP (ref 15–50)
IRON SATN MFR SERPL: 46 UG/DL — SIGNIFICANT CHANGE UP (ref 35–150)
LYMPHOCYTES # BLD AUTO: 0.3 K/UL — LOW (ref 1.2–3.4)
LYMPHOCYTES # BLD AUTO: 0.32 K/UL — LOW (ref 1.2–3.4)
LYMPHOCYTES # BLD AUTO: 0.37 K/UL — LOW (ref 1.2–3.4)
LYMPHOCYTES # BLD AUTO: 0.43 K/UL — LOW (ref 1.2–3.4)
LYMPHOCYTES # BLD AUTO: 4.1 % — LOW (ref 20.5–51.1)
LYMPHOCYTES # BLD AUTO: 4.2 % — LOW (ref 20.5–51.1)
LYMPHOCYTES # BLD AUTO: 4.7 % — LOW (ref 20.5–51.1)
LYMPHOCYTES # BLD AUTO: 4.8 % — LOW (ref 20.5–51.1)
MAGNESIUM SERPL-MCNC: 2.2 MG/DL — SIGNIFICANT CHANGE UP (ref 1.8–2.4)
MCHC RBC-ENTMCNC: 31.7 PG — HIGH (ref 27–31)
MCHC RBC-ENTMCNC: 31.8 PG — HIGH (ref 27–31)
MCHC RBC-ENTMCNC: 32 PG — HIGH (ref 27–31)
MCHC RBC-ENTMCNC: 32.1 PG — HIGH (ref 27–31)
MCHC RBC-ENTMCNC: 32.8 G/DL — SIGNIFICANT CHANGE UP (ref 32–37)
MCHC RBC-ENTMCNC: 33.3 G/DL — SIGNIFICANT CHANGE UP (ref 32–37)
MCHC RBC-ENTMCNC: 33.7 G/DL — SIGNIFICANT CHANGE UP (ref 32–37)
MCHC RBC-ENTMCNC: 34 G/DL — SIGNIFICANT CHANGE UP (ref 32–37)
MCV RBC AUTO: 94.2 FL — SIGNIFICANT CHANGE UP (ref 81–99)
MCV RBC AUTO: 94.6 FL — SIGNIFICANT CHANGE UP (ref 81–99)
MCV RBC AUTO: 95.3 FL — SIGNIFICANT CHANGE UP (ref 81–99)
MCV RBC AUTO: 97.6 FL — SIGNIFICANT CHANGE UP (ref 81–99)
MONOCYTES # BLD AUTO: 0.61 K/UL — HIGH (ref 0.1–0.6)
MONOCYTES # BLD AUTO: 0.77 K/UL — HIGH (ref 0.1–0.6)
MONOCYTES # BLD AUTO: 0.9 K/UL — HIGH (ref 0.1–0.6)
MONOCYTES # BLD AUTO: 0.94 K/UL — HIGH (ref 0.1–0.6)
MONOCYTES NFR BLD AUTO: 10.4 % — HIGH (ref 1.7–9.3)
MONOCYTES NFR BLD AUTO: 10.5 % — HIGH (ref 1.7–9.3)
MONOCYTES NFR BLD AUTO: 11.7 % — HIGH (ref 1.7–9.3)
MONOCYTES NFR BLD AUTO: 8 % — SIGNIFICANT CHANGE UP (ref 1.7–9.3)
NEUTROPHILS # BLD AUTO: 6.22 K/UL — SIGNIFICANT CHANGE UP (ref 1.4–6.5)
NEUTROPHILS # BLD AUTO: 6.33 K/UL — SIGNIFICANT CHANGE UP (ref 1.4–6.5)
NEUTROPHILS # BLD AUTO: 6.55 K/UL — HIGH (ref 1.4–6.5)
NEUTROPHILS # BLD AUTO: 7.6 K/UL — HIGH (ref 1.4–6.5)
NEUTROPHILS NFR BLD AUTO: 82.3 % — HIGH (ref 42.2–75.2)
NEUTROPHILS NFR BLD AUTO: 83.7 % — HIGH (ref 42.2–75.2)
NEUTROPHILS NFR BLD AUTO: 84.5 % — HIGH (ref 42.2–75.2)
NEUTROPHILS NFR BLD AUTO: 86.3 % — HIGH (ref 42.2–75.2)
NRBC # BLD: 0 /100 WBCS — SIGNIFICANT CHANGE UP (ref 0–0)
PLATELET # BLD AUTO: 148 K/UL — SIGNIFICANT CHANGE UP (ref 130–400)
PLATELET # BLD AUTO: 154 K/UL — SIGNIFICANT CHANGE UP (ref 130–400)
PLATELET # BLD AUTO: 167 K/UL — SIGNIFICANT CHANGE UP (ref 130–400)
PLATELET # BLD AUTO: 171 K/UL — SIGNIFICANT CHANGE UP (ref 130–400)
POTASSIUM SERPL-MCNC: 4.2 MMOL/L — SIGNIFICANT CHANGE UP (ref 3.5–5)
POTASSIUM SERPL-SCNC: 4.2 MMOL/L — SIGNIFICANT CHANGE UP (ref 3.5–5)
PROT SERPL-MCNC: 4.9 G/DL — LOW (ref 6–8)
RBC # BLD: 2.08 M/UL — LOW (ref 4.2–5.4)
RBC # BLD: 2.14 M/UL — LOW (ref 4.2–5.4)
RBC # BLD: 2.21 M/UL — LOW (ref 4.2–5.4)
RBC # BLD: 2.5 M/UL — LOW (ref 4.2–5.4)
RBC # FLD: 13.6 % — SIGNIFICANT CHANGE UP (ref 11.5–14.5)
RBC # FLD: 13.7 % — SIGNIFICANT CHANGE UP (ref 11.5–14.5)
RBC # FLD: 13.9 % — SIGNIFICANT CHANGE UP (ref 11.5–14.5)
RBC # FLD: 13.9 % — SIGNIFICANT CHANGE UP (ref 11.5–14.5)
SODIUM SERPL-SCNC: 131 MMOL/L — LOW (ref 135–146)
TIBC SERPL-MCNC: 207 UG/DL — LOW (ref 220–430)
UIBC SERPL-MCNC: 161 UG/DL — SIGNIFICANT CHANGE UP (ref 110–370)
WBC # BLD: 7.36 K/UL — SIGNIFICANT CHANGE UP (ref 4.8–10.8)
WBC # BLD: 7.6 K/UL — SIGNIFICANT CHANGE UP (ref 4.8–10.8)
WBC # BLD: 7.69 K/UL — SIGNIFICANT CHANGE UP (ref 4.8–10.8)
WBC # BLD: 9.08 K/UL — SIGNIFICANT CHANGE UP (ref 4.8–10.8)
WBC # FLD AUTO: 7.36 K/UL — SIGNIFICANT CHANGE UP (ref 4.8–10.8)
WBC # FLD AUTO: 7.6 K/UL — SIGNIFICANT CHANGE UP (ref 4.8–10.8)
WBC # FLD AUTO: 7.69 K/UL — SIGNIFICANT CHANGE UP (ref 4.8–10.8)
WBC # FLD AUTO: 9.08 K/UL — SIGNIFICANT CHANGE UP (ref 4.8–10.8)

## 2021-11-24 PROCEDURE — 99233 SBSQ HOSP IP/OBS HIGH 50: CPT

## 2021-11-24 RX ADMIN — NYSTATIN CREAM 1 APPLICATION(S): 100000 CREAM TOPICAL at 05:30

## 2021-11-24 RX ADMIN — SODIUM CHLORIDE 75 MILLILITER(S): 9 INJECTION INTRAMUSCULAR; INTRAVENOUS; SUBCUTANEOUS at 05:45

## 2021-11-24 RX ADMIN — Medication 25 MILLIGRAM(S): at 17:25

## 2021-11-24 RX ADMIN — Medication 150 MICROGRAM(S): at 05:30

## 2021-11-24 RX ADMIN — Medication 1 MILLIGRAM(S): at 12:31

## 2021-11-24 RX ADMIN — GABAPENTIN 300 MILLIGRAM(S): 400 CAPSULE ORAL at 05:30

## 2021-11-24 RX ADMIN — PANTOPRAZOLE SODIUM 40 MILLIGRAM(S): 20 TABLET, DELAYED RELEASE ORAL at 05:30

## 2021-11-24 RX ADMIN — SODIUM CHLORIDE 75 MILLILITER(S): 9 INJECTION INTRAMUSCULAR; INTRAVENOUS; SUBCUTANEOUS at 21:29

## 2021-11-24 RX ADMIN — GABAPENTIN 300 MILLIGRAM(S): 400 CAPSULE ORAL at 17:24

## 2021-11-24 RX ADMIN — Medication 25 MILLIGRAM(S): at 05:29

## 2021-11-24 RX ADMIN — GABAPENTIN 300 MILLIGRAM(S): 400 CAPSULE ORAL at 21:13

## 2021-11-24 RX ADMIN — NYSTATIN CREAM 1 APPLICATION(S): 100000 CREAM TOPICAL at 17:25

## 2021-11-24 RX ADMIN — ATORVASTATIN CALCIUM 10 MILLIGRAM(S): 80 TABLET, FILM COATED ORAL at 21:13

## 2021-11-24 NOTE — PROGRESS NOTE ADULT - SUBJECTIVE AND OBJECTIVE BOX
Location: [address] ([facility] T2-3A)  Patient Name: MELANIA COOMBS  Age: 89y  Gender: Female      Progress Note  This morning patient was seen and examined at bedside.    Today is hospital day 5d.  Ms. Coombs is doing fine.   She reports she had a good night sleep.   She denies left hip pain when in bed.   Her appetite is reduced, but she denies nausea or vomiting.   She denies any abdominal pain, diarrhea, or constipation.   She couldn't remember if she had a recent BM but has been going to toilet. She doesn't think she has noted any melena or hematochezia or other source of bleed.  She hasn't been ambulating but PT is on board and walker is at bedside.  She denies any shortness of breath, chest pain, palpitations, or light headedness.   She is voiding via lenz catheter.      Vital Signs in the last 24 hours   Vitals Summary T(C): 36.2 (21 @ 05:23), Max: 36.7 (21 @ 12:48)  HR: 59 (21 @ 05:23) (58 - 63)  BP: 102/59 (21 @ 05:23) (102/59 - 130/60)  RR: 20 (21 @ 05:23) (18 - 20)  SpO2: 93% (21 @ 20:05) (93% - 93%)  Vent Data   Intake/ Output       Physical Exam  * General Appearance: Alert, cooperative, interactive, well-groomed, oriented to time, place, and person, in no acute distress  * Head: Normocephalic, without obvious abnormality, atraumatic  * Lungs: Respirations unlabored, Good bilateral air entry, normal breath sounds (Clear to auscultation bilaterally, no audible wheezes, crackles, or rhonchi)  * Heart: Regular Rate and Rhythm, normal S1 and S2, no audible murmur, rub, or gallop  * Abdomen: Symmetric, non-distended, no scar, soft, non-tender, bowel sounds active all four quadrants, no masses, no organomegaly (no hepatosplenomegaly)  * Extremities: left hip dressing clean and not soaked with blood at time of my evaluation at around 07:10 AM, no lower extremity pitting edema bilaterally, adequate dorsalis pedis pulses  * Pulses: 2+ and symmetric all extremities  * Skin: Skin color, texture, turgor normal, no rashes or lesions  * Lymph nodes: Cervical, supraclavicular, and axillary nodes normal  * Neurologic: CNII-XII intact, normal strength, sensation and reflexes throughout      Investigations   Laboratory Workup  - CBC:                        7.1    9.08  )-----------( 154      ( 2021 01:00 )             20.9     - Chemistry:      132<L>  |  95<L>  |  69<HH>  ----------------------------<  150<H>  4.4   |  19  |  3.7<H>    Ca    8.1<L>      2021 18:38  Mg     1.7         TPro  4.9<L>  /  Alb  3.0<L>  /  TBili  0.3  /  DBili  x   /  AST  108<H>  /  ALT  <5  /  AlkPhos  93      - Coagulation Studies:    - ABG:    - Cardiac Markers:  CARDIAC MARKERS ( 2021 18:38 )  x     / x     / 305 U/L / x     / x          Microbiological Workup  Urinalysis Basic - ( 2021 14:59 )    Color: Yellow / Appearance: Slightly Turbid / S.020 / pH: x  Gluc: x / Ketone: Trace  / Bili: Negative / Urobili: <2 mg/dL   Blood: x / Protein: 100 mg/dL / Nitrite: Positive   Leuk Esterase: Moderate / RBC: 3 /HPF / WBC 28 /HPF   Sq Epi: x / Non Sq Epi: 1 /HPF / Bacteria: Few      Current Medications  Standing Medications  apixaban 5 milliGRAM(s) Oral two times a day  aspirin enteric coated 81 milliGRAM(s) Oral daily  atorvastatin 10 milliGRAM(s) Oral at bedtime  folic acid 1 milliGRAM(s) Oral daily  gabapentin 300 milliGRAM(s) Oral three times a day  levothyroxine 150 MICROGram(s) Oral daily  metoprolol tartrate 25 milliGRAM(s) Oral two times a day  nystatin Cream 1 Application(s) Topical two times a day  pantoprazole    Tablet 40 milliGRAM(s) Oral before breakfast  sodium chloride 0.9%. 1000 milliLiter(s) (75 mL/Hr) IV Continuous <Continuous>    PRN Medications  acetaminophen     Tablet .. 650 milliGRAM(s) Oral every 6 hours PRN Mild Pain (1 - 3)  bisacodyl Suppository 10 milliGRAM(s) Rectal daily PRN If no bowel movement  magnesium hydroxide Suspension 30 milliLiter(s) Oral daily PRN Constipation  ondansetron Injectable 4 milliGRAM(s) IV Push every 6 hours PRN Nausea and/or Vomiting  oxyCODONE    IR 5 milliGRAM(s) Oral every 4 hours PRN Mild Pain (1 - 3)    Singles Doses Administered  (Floorstock) 1 each &lt;see task&gt; GiveOnce  (Floorstock) 1 each &lt;see task&gt; GiveOnce  (Floorstock) 1 each &lt;see task&gt; GiveOnce  (Floorstock) 1 each &lt;see task&gt; GiveOnce  (Floorstock) 1 each &lt;see task&gt; GiveOnce  (Floorstock) 1 each &lt;see task&gt; GiveOnce  acetaminophen     Tablet .. 975 milliGRAM(s) Oral once  ceFAZolin   IVPB 2000 milliGRAM(s) IV Intermittent every 8 hours  diltiazem Injectable 10 milliGRAM(s) IV Push once  lactated ringers Bolus 1000 milliLiter(s) IV Bolus once  lactated ringers. 1000 milliLiter(s) IV Continuous <Continuous>  magnesium sulfate  IVPB 2 Gram(s) IV Intermittent once  morphine  - Injectable 2 milliGRAM(s) IV Push once  morphine  - Injectable 2 milliGRAM(s) IV Push once  morphine  - Injectable 2 milliGRAM(s) IV Push Once  ondansetron Injectable 4 milliGRAM(s) IV Push once PRN

## 2021-11-24 NOTE — PROGRESS NOTE ADULT - ASSESSMENT
Assessment and Plan  Case of an 89 year old female patient with multiple comorbidities who was brought on 11/19 following an episode of a fall, found to have acute left intertrochanteric femur fracture status post left ORIF on 11/20, stay complicated by new onset atrial fibrillation with RVR on 11/21 and a drop in Hb from 10.2 to 7.1 on 11/23. Currently hemodynamically stable.      Fall on 11/19- Likely Mechanical   Complicated by Acute Left Intertrochanteric Femoral Fracture  Chronic Left Humeral Head Fracture  * XR Pelvis 11/19 Acute Left Intertrochanteric Femoral Fracture  * XR left humerus 11/19 chronic humerus head fracture  * XR left hip 11/19 Acute Left Intertrochanteric Femoral Fracture  * XR Left knee 11/19 no acute process  * XR Left femur 11/19 Acute Left Intertrochanteric Femoral Fracture  * CT AP without contrast 11/19 Acute Left Intertrochanteric Femoral Fracture    - Orthopedic Team on board: s/p Left ORIF on 11/20  - Monitor pain and keep score at 01/10: Tylenol 650mg Q6h PRN and OC IR 5mg Q4h PRN  - PT/OT/Physiatry on board: Continue WBAT to Left LE. Baseline ambulates with walker   - On Eliquis 5mg BID for new onset atrial fibrillation with RVR as below (held AM dose on 11/24 for drop in Hb)  - Encourage incentive spirometry use      Acute Kidney Injury  Urinary Retention s/p straight cath 11/121  Rule Out Prerenal from reduced PO intake Although BUN:Cr not >20:1  * Retained 700cc possibly secondary to anesthesia and immobility s/p straight cath 11/21 s/p lenz insertion 11/23  * Labs 11/19 BUN 5, Cr 0.9 -> 11/23 BUN 64/69, Cr 3.7/3.7    - Trend BUN and Cr daily: 11/19 BUN 5, Cr 0.9 -> 11/23 BUN 64/69, Cr 3.7/3.7  - Monitor accurate in/out: 11/24 -550cc (inaccurate)  - Continue IV fluid hydration with NS at 75mL/hour (started 11/23)      New Onset Atrial Fibrillation with RVR 11/19  * Stay complicated by new onset atrial fibrillation with RVR on 11/21  * CHADSVASC 3  * ECG 11/19  bpm, afib with RVR  * TTE 11/19/21 EF 57%, mild PHTN, mild-mod MR, mod AR, mild TR    - Cardiology Dr Arrieta on board  - Monitor HR: 11/24 59 bpm  - For rate control: Continue Lopressor PO 25mg BID  - For anticoagulation: Niece Ms Nolan agreeable to eliquis 5mg BID (held AM dose 11/24 due to recent drop in Hb)  - Keep K>4 and Mg>2      Acute Drop in Hemoglobin-Normocytic Anemia  Rule Out Iron Deficiency Anemia from Blood Loss in Setting of Recent Orthopedic Surgery 11/20  * Recent Left ORIF 11/20  * Hb 11/19 13.1 -> 11/21 10.2 -> 11/23 7.1-> 11/24 7.1, MCV 94.6  * Dressing soaked with blood 11/24 AM per Orthopedic note s/p changing the dressing  * Urinalysis 11/23: moderate blood, + nitrite, mod LE    - Trend CBC BID:  11/19 13.1 -> 11/21 10.2 -> 11/23 7.1-> 11/24 7.1, MCV 94.6 -> Follow up repeat at 04:30 AM and 11:00 AM and Transfuse as needed  - Keep active Type and Screen: most recent from 11/23 O+  - Monitor dressing near surgical site Q shift or > frequently: on 11/24 midnight was soaked with blood s/p Orthopedic evaluation and exchange  - Still on Aspirin 81mg QD primary prophylaxis  - Held AM dose of Eliquis 5mg BID on 11/24 for the recent drop in Hb  - Check iron studies (ordered)      6mm RUL Lung Nodule  * CT AP without contrast 6mm RUL nodule  - Outpatient follow up with a repeat CT in few months       Chronic Left Popliteal Vein Thrombus   * [facility]/29[facility]20  * Was off AC before coming to ED  * Started on AC for new onset afib as above  - Anticoagulation information as detailed before      Others  - DVT Prophylaxis: as detailed above   - GI Prophylaxis: Pantoprazole 40mg PO QD  - Diet: Regular  - Code Status: Full    Barriers to learning: NO  Discharge Planning: Patient will be discharged once stable and  Plan was communicated with patient and medical team      Pablito Manzo MD  PGY - 2 Internal Medicine   [facility]   Assessment and Plan  Case of an 89 year old female patient with multiple comorbidities who was brought on 11/19 following an episode of a fall, found to have acute left intertrochanteric femur fracture status post left ORIF on 11/20, stay complicated by new onset atrial fibrillation with RVR on 11/21 and a drop in Hb from 10.2 to 7.1 on 11/23. Currently hemodynamically stable.      Fall on 11/19- Likely Mechanical   Complicated by Acute Left Intertrochanteric Femoral Fracture  Chronic Left Humeral Head Fracture  * XR Pelvis 11/19 Acute Left Intertrochanteric Femoral Fracture  * XR left humerus 11/19 chronic humerus head fracture  * XR left hip 11/19 Acute Left Intertrochanteric Femoral Fracture  * XR Left knee 11/19 no acute process  * XR Left femur 11/19 Acute Left Intertrochanteric Femoral Fracture  * CT AP without contrast 11/19 Acute Left Intertrochanteric Femoral Fracture    - Orthopedic Team on board: s/p Left ORIF on 11/20  - Monitor pain and keep score at 01/10: Tylenol 650mg Q6h PRN and OC IR 5mg Q4h PRN  - PT/OT/Physiatry on board: Continue WBAT to Left LE. Baseline ambulates with walker   - On Eliquis 5mg BID for new onset atrial fibrillation with RVR as below (held AM dose on 11/24 for drop in Hb)  - Encourage incentive spirometry use      Acute Kidney Injury  Urinary Retention s/p straight cath 11/121  Rule Out Prerenal from reduced PO intake Although BUN:Cr not >20:1  * Retained 700cc possibly secondary to anesthesia and immobility s/p straight cath 11/21 s/p lenz insertion 11/23  * Labs 11/19 BUN 5, Cr 0.9 -> 11/23 BUN 64/69, Cr 3.7/3.7    - Trend BUN and Cr daily: 11/19 BUN 5, Cr 0.9 -> 11/23 BUN 64/69, Cr 3.7/3.7  - Monitor accurate in/out: 11/24 -550cc (inaccurate)  - Continue IV fluid hydration with NS at 75mL/hour (started 11/23)      New Onset Atrial Fibrillation with RVR 11/19  * Stay complicated by new onset atrial fibrillation with RVR on 11/21  * CHADSVASC 3  * ECG 11/19  bpm, afib with RVR  * TTE 11/19/21 EF 57%, mild PHTN, mild-mod MR, mod AR, mild TR    - Cardiology Dr Arrieta on board  - Monitor HR: 11/24 59 bpm  - For rate control: Continue Lopressor PO 25mg BID  - For anticoagulation: Niece Ms Nolan agreeable to eliquis 5mg BID (held AM dose 11/24 due to recent drop in Hb)  - Keep K>4 and Mg>2      Acute Drop in Hemoglobin-Normocytic Anemia  Rule Out Iron Deficiency Anemia from Blood Loss in Setting of Recent Orthopedic Surgery 11/20  * Recent Left ORIF 11/20  * Hb 11/19 13.1 -> 11/21 10.2 -> 11/23 7.1-> 11/24 7.1, MCV 94.6  * Dressing soaked with blood 11/24 AM per Orthopedic note s/p changing the dressing  * Urinalysis 11/23: moderate blood, + nitrite, mod LE    - Trend CBC BID:  11/19 13.1 -> 11/21 10.2 -> 11/23 7.1-> 11/24 7.1, MCV 94.6 -> Follow up repeat at 04:30 AM and 11:00 AM and Transfuse as needed  - Keep active Type and Screen: most recent from 11/23 O+  - Monitor dressing near surgical site Q shift or > frequently: on 11/24 midnight was soaked with blood s/p Orthopedic evaluation and exchange  - Still on Aspirin 81mg QD primary prophylaxis  - Held AM dose of Eliquis 5mg BID on 11/24 for the recent drop in Hb  - Check iron studies (ordered)      6mm RUL Lung Nodule  * CT AP without contrast 6mm RUL nodule  - Outpatient follow up with a repeat CT in few months       Chronic Left Popliteal Vein Thrombus   * [facility]/29[facility]20  * Was off AC before coming to ED  * Started on AC for new onset afib as above  - Anticoagulation information as detailed before      Dyslipidemia  * No lipid profile  - Continue Atorvastatin 10mg QD      Hypothyroidism  * TSH Thyroid Stimulating Hormone, Serum (11.19.21 @ 21:00)    Thyroid Stimulating Hormone, Serum: 4.36 uIU/mL  - Continue Levothyroxine 150mcg QD      Lupus   Osteoarthritis  - Pain control as above  - PT as above  - Outpatient follow up as indicated      DM II  Diabetic Neuropathy  * No Hba1c   - Monitor POCT  - No need for insulin for now  - Gabapentin 300mg TID         Others  - DVT Prophylaxis: as detailed above   - GI Prophylaxis: Pantoprazole 40mg PO QD  - Diet: Regular  - Code Status: Full    Barriers to learning: NO  Discharge Planning: Patient will be discharged once stable and  Plan was communicated with patient and medical team      Pablito Manzo MD  PGY - 2 Internal Medicine   [facility]   Assessment and Plan  Case of an 89 year old female patient with multiple comorbidities who was brought on 11/19 following an episode of a fall, found to have acute left intertrochanteric femur fracture status post left ORIF on 11/20, stay complicated by new onset atrial fibrillation with RVR on 11/21 and a drop in Hb from 10.2 to 7.1 on 11/23. Currently hemodynamically stable.      Fall on 11/19- Likely Mechanical   Complicated by Acute Left Intertrochanteric Femoral Fracture  Chronic Left Humeral Head Fracture  * XR Pelvis 11/19 Acute Left Intertrochanteric Femoral Fracture  * XR left humerus 11/19 chronic humerus head fracture  * XR left hip 11/19 Acute Left Intertrochanteric Femoral Fracture  * XR Left knee 11/19 no acute process  * XR Left femur 11/19 Acute Left Intertrochanteric Femoral Fracture  * CT AP without contrast 11/19 Acute Left Intertrochanteric Femoral Fracture    - Orthopedic Team on board: s/p Left ORIF on 11/20  - Monitor pain and keep score at 01/10: Tylenol 650mg Q6h PRN and OC IR 5mg Q4h PRN  - PT/OT/Physiatry on board: Continue WBAT to Left LE. Baseline ambulates with walker   - On Eliquis 5mg BID for new onset atrial fibrillation with RVR as below (held AM dose on 11/24 for drop in Hb)  - Encourage incentive spirometry use      Acute Kidney Injury  Urinary Retention s/p straight cath 11/121  Rule Out Prerenal from reduced PO intake Although BUN:Cr not >20:1  * Retained 700cc possibly secondary to anesthesia and immobility s/p straight cath 11/21 s/p lenz insertion 11/23  * Labs 11/19 BUN 5, Cr 0.9 -> 11/23 BUN 64/69, Cr 3.7/3.7    - Trend BUN and Cr daily: 11/19 BUN 5, Cr 0.9 -> 11/23 BUN 64/69, Cr 3.7/3.7  - Monitor accurate in/out: 11/24 -550cc (inaccurate)  - Continue IV fluid hydration with NS at 75mL/hour (started 11/23)      New Onset Atrial Fibrillation with RVR 11/19  * Stay complicated by new onset atrial fibrillation with RVR on 11/21  * CHADSVASC 3  * ECG 11/19  bpm, afib with RVR  * TTE 11/19/21 EF 57%, mild PHTN, mild-mod MR, mod AR, mild TR    - Cardiology Dr Arrieta on board  - Monitor HR: 11/24 59 bpm  - For rate control: Continue Lopressor PO 25mg BID  - For anticoagulation: Niece Ms Nolan agreeable to eliquis 5mg BID (held AM dose 11/24 due to recent drop in Hb)  - Keep K>4 and Mg>2      Acute Drop in Hemoglobin-Normocytic Anemia  Rule Out Iron Deficiency Anemia from Blood Loss in Setting of Recent Orthopedic Surgery 11/20  * Recent Left ORIF 11/20  * Hb 11/19 13.1 -> 11/21 10.2 -> 11/23 7.1-> 11/24 7.1, MCV 94.6  * Dressing soaked with blood 11/24 AM per Orthopedic note s/p changing the dressing  * Urinalysis 11/23: moderate blood, + nitrite, mod LE    - Trend CBC BID:  11/19 13.1 -> 11/21 10.2 -> 11/23 7.1-> 11/24 7.1, MCV 94.6 -> 04:30 AM: 6.6 so will transfuse 1 unit of packed RBC (consent obtained from health care proxy niece Ms Kilgore over phone on 11/24 and another consent obtained from patient on 11/23. Follow up repeat CBC 16:00 PM and Transfuse as needed  - Keep active Type and Screen: most recent from 11/23 O+  - Monitor dressing near surgical site Q shift or > frequently: on 11/24 midnight was soaked with blood s/p Orthopedic evaluation and exchange  - Still on Aspirin 81mg QD primary prophylaxis  - Held AM dose of Eliquis 5mg BID on 11/24 for the recent drop in Hb  - Check iron studies (ordered)      6mm RUL Lung Nodule  * CT AP without contrast 6mm RUL nodule  - Outpatient follow up with a repeat CT in few months       Chronic Left Popliteal Vein Thrombus   * [facility] 01/29/20  * Was off AC before coming to ED  * Started on AC for new onset afib as above  - Anticoagulation information as detailed before      Dyslipidemia  * No lipid profile  - Continue Atorvastatin 10mg QD      Hypothyroidism  * TSH Thyroid Stimulating Hormone, Serum (11.19.21 @ 21:00)    Thyroid Stimulating Hormone, Serum: 4.36 uIU/mL  - Continue Levothyroxine 150mcg QD      Lupus   Osteoarthritis  - Pain control as above  - PT as above  - Outpatient follow up as indicated      DM II  Diabetic Neuropathy  * No Hba1c   - Monitor POCT  - No need for insulin for now  - Gabapentin 300mg TID         Others  - DVT Prophylaxis: as detailed above   - GI Prophylaxis: Pantoprazole 40mg PO QD  - Diet: Regular  - Code Status: Full    Barriers to learning: NO  Discharge Planning: Patient will be discharged once stable and  Plan was communicated with patient and medical team      Pablito Manzo MD  PGY - 2 Internal Medicine   [facility]

## 2021-11-24 NOTE — PROGRESS NOTE ADULT - SUBJECTIVE AND OBJECTIVE BOX
ORTHOPAEDIC SURGERY PROGRESS NOTE    Patient seen and examined at bedside this morning. Pain well controlled. No new complaints.    LLE  Distal dressing c/i with moderate strikethrough, removed and replaced by ortho  Incisions c/d/i with staples in place  Firing EHL/FHL/GSC/TA  SILT DPN/SPN/Morfin/S/T  Foot wwp, DP pulse 2+    A&P  88yo F w/ L intertrochanteric hip fracture s/p IMN 11/20/21. Doing well on POD4. In setting of saturated dressings, will apply binder to aid in compression/ reduce drainage.  -WBAT LLE  -DVT ppx  -IS  -pain control  -PT/OT  -please contact ortho with any questions or concerns   -upon discharge, please follow up with Dr. Staples at 2 weeks post-op, at [id number] [facility]. Please call to schedule an appointment: [phone]

## 2021-11-25 LAB
ANION GAP SERPL CALC-SCNC: 16 MMOL/L — HIGH (ref 7–14)
BASOPHILS # BLD AUTO: 0.02 K/UL — SIGNIFICANT CHANGE UP (ref 0–0.2)
BASOPHILS NFR BLD AUTO: 0.2 % — SIGNIFICANT CHANGE UP (ref 0–1)
BUN SERPL-MCNC: 57 MG/DL — HIGH (ref 10–20)
CALCIUM SERPL-MCNC: 8.6 MG/DL — SIGNIFICANT CHANGE UP (ref 8.5–10.1)
CHLORIDE SERPL-SCNC: 103 MMOL/L — SIGNIFICANT CHANGE UP (ref 98–110)
CO2 SERPL-SCNC: 19 MMOL/L — SIGNIFICANT CHANGE UP (ref 17–32)
CREAT SERPL-MCNC: 2.2 MG/DL — HIGH (ref 0.7–1.5)
EOSINOPHIL # BLD AUTO: 0.1 K/UL — SIGNIFICANT CHANGE UP (ref 0–0.7)
EOSINOPHIL NFR BLD AUTO: 1.2 % — SIGNIFICANT CHANGE UP (ref 0–8)
FERRITIN SERPL-MCNC: 296 NG/ML — HIGH (ref 15–150)
GLUCOSE BLDC GLUCOMTR-MCNC: 136 MG/DL — HIGH (ref 70–99)
GLUCOSE BLDC GLUCOMTR-MCNC: 136 MG/DL — HIGH (ref 70–99)
GLUCOSE BLDC GLUCOMTR-MCNC: 143 MG/DL — HIGH (ref 70–99)
GLUCOSE BLDC GLUCOMTR-MCNC: 145 MG/DL — HIGH (ref 70–99)
GLUCOSE SERPL-MCNC: 131 MG/DL — HIGH (ref 70–99)
HCT VFR BLD CALC: 24.5 % — LOW (ref 37–47)
HGB BLD-MCNC: 8.4 G/DL — LOW (ref 12–16)
IMM GRANULOCYTES NFR BLD AUTO: 0.5 % — HIGH (ref 0.1–0.3)
LYMPHOCYTES # BLD AUTO: 0.38 K/UL — LOW (ref 1.2–3.4)
LYMPHOCYTES # BLD AUTO: 4.6 % — LOW (ref 20.5–51.1)
MCHC RBC-ENTMCNC: 32.8 PG — HIGH (ref 27–31)
MCHC RBC-ENTMCNC: 34.3 G/DL — SIGNIFICANT CHANGE UP (ref 32–37)
MCV RBC AUTO: 95.7 FL — SIGNIFICANT CHANGE UP (ref 81–99)
MONOCYTES # BLD AUTO: 0.97 K/UL — HIGH (ref 0.1–0.6)
MONOCYTES NFR BLD AUTO: 11.7 % — HIGH (ref 1.7–9.3)
NEUTROPHILS # BLD AUTO: 6.77 K/UL — HIGH (ref 1.4–6.5)
NEUTROPHILS NFR BLD AUTO: 81.8 % — HIGH (ref 42.2–75.2)
NRBC # BLD: 0 /100 WBCS — SIGNIFICANT CHANGE UP (ref 0–0)
PLATELET # BLD AUTO: 205 K/UL — SIGNIFICANT CHANGE UP (ref 130–400)
POTASSIUM SERPL-MCNC: 4.4 MMOL/L — SIGNIFICANT CHANGE UP (ref 3.5–5)
POTASSIUM SERPL-SCNC: 4.4 MMOL/L — SIGNIFICANT CHANGE UP (ref 3.5–5)
RBC # BLD: 2.56 M/UL — LOW (ref 4.2–5.4)
RBC # FLD: 14 % — SIGNIFICANT CHANGE UP (ref 11.5–14.5)
SODIUM SERPL-SCNC: 138 MMOL/L — SIGNIFICANT CHANGE UP (ref 135–146)
WBC # BLD: 8.28 K/UL — SIGNIFICANT CHANGE UP (ref 4.8–10.8)
WBC # FLD AUTO: 8.28 K/UL — SIGNIFICANT CHANGE UP (ref 4.8–10.8)

## 2021-11-25 PROCEDURE — 99233 SBSQ HOSP IP/OBS HIGH 50: CPT

## 2021-11-25 RX ADMIN — GABAPENTIN 300 MILLIGRAM(S): 400 CAPSULE ORAL at 22:42

## 2021-11-25 RX ADMIN — Medication 1 MILLIGRAM(S): at 15:10

## 2021-11-25 RX ADMIN — Medication 25 MILLIGRAM(S): at 17:31

## 2021-11-25 RX ADMIN — Medication 25 MILLIGRAM(S): at 05:13

## 2021-11-25 RX ADMIN — GABAPENTIN 300 MILLIGRAM(S): 400 CAPSULE ORAL at 05:13

## 2021-11-25 RX ADMIN — NYSTATIN CREAM 1 APPLICATION(S): 100000 CREAM TOPICAL at 17:21

## 2021-11-25 RX ADMIN — SODIUM CHLORIDE 75 MILLILITER(S): 9 INJECTION INTRAMUSCULAR; INTRAVENOUS; SUBCUTANEOUS at 07:00

## 2021-11-25 RX ADMIN — Medication 150 MICROGRAM(S): at 05:12

## 2021-11-25 RX ADMIN — ATORVASTATIN CALCIUM 10 MILLIGRAM(S): 80 TABLET, FILM COATED ORAL at 22:43

## 2021-11-25 RX ADMIN — PANTOPRAZOLE SODIUM 40 MILLIGRAM(S): 20 TABLET, DELAYED RELEASE ORAL at 06:01

## 2021-11-25 RX ADMIN — GABAPENTIN 300 MILLIGRAM(S): 400 CAPSULE ORAL at 15:10

## 2021-11-25 NOTE — PROGRESS NOTE ADULT - SUBJECTIVE AND OBJECTIVE BOX
KUN MELANIA  [facility] T2-3A 017 A ([facility] T2-3A)      Patient was evaluated and examined  by bedside, refused left hip dressing change, no gross bleeding events over night time        REVIEW OF SYSTEMS:  please see pertinent positives mentioned above, all other 12 ROS negative      T(C): , Max: 36.3 (11-24-21 @ 21:14)  HR: 60 (11-25-21 @ 06:00)  BP: 146/69 (11-25-21 @ 06:37)  RR: 18 (11-25-21 @ 06:00)  SpO2: 92% (11-24-21 @ 21:14)  CAPILLARY BLOOD GLUCOSE      POCT Blood Glucose.: 136 mg/dL (25 Nov 2021 07:41)  POCT Blood Glucose.: 128 mg/dL (24 Nov 2021 21:41)  POCT Blood Glucose.: 138 mg/dL (24 Nov 2021 16:45)  POCT Blood Glucose.: 139 mg/dL (24 Nov 2021 11:39)      PHYSICAL EXAM:  General: NAD, AAOX3, patient is laying comfortably in bed  HEENT: AT, NC, Supple, NO JVD, NO CB  Lungs: CTA B/L, no wheezing, no rhonchi  CVS: normal S1, S2, RRR, NO M/G/R  Abdomen: soft, bowel sounds present, non-tender, non-distended  : lenz present   Extremities: left hip post op edema , incision covered with dressing, , no clubbing, no cyanosis, positive peripheral pulses b/l  Neuro: no acute focal neurological deficits, generalized body weakness  Skin: no rash, no ecchymosis      LAB  CBC  Date: 11-24-21 @ 16:00  Mean cell Olmjnaqjmr29.0  Mean cell Hemoglobin Conc32.8  Mean cell Volum 97.6  Platelet count-Automate 167  RBC Count 2.50  Red Cell Distrib Width13.9  WBC Count7.69  % Albumin, Urine--  Hematocrit 24.4  Hemoglobin 8.0  CBC  Date: 11-24-21 @ 12:04  Mean cell Burqbrphdu69.8  Mean cell Hemoglobin Conc33.3  Mean cell Volum 95.3  Platelet count-Automate 148  RBC Count 2.14  Red Cell Distrib Width13.9  WBC Count7.36  % Albumin, Urine--  Hematocrit 20.4  Hemoglobin 6.8  CBC  Date: 11-24-21 @ 07:07  Mean cell Urwowkjbkx50.7  Mean cell Hemoglobin Conc33.7  Mean cell Volum 94.2  Platelet count-Automate 171  RBC Count 2.08  Red Cell Distrib Width13.6  WBC Count7.60  % Albumin, Urine--  Hematocrit 19.6  Hemoglobin 6.6  CBC  Date: 11-24-21 @ 01:00  Mean cell Zdhxqvpgvy15.1  Mean cell Hemoglobin Conc34.0  Mean cell Volum 94.6  Platelet count-Automate 154  RBC Count 2.21  Red Cell Distrib Width13.7  WBC Count9.08  % Albumin, Urine--  Hematocrit 20.9  Hemoglobin 7.1  CBC  Date: 11-23-21 @ 06:26  Mean cell Gnomqveixo96.3  Mean cell Hemoglobin Conc33.2  Mean cell Volum 94.3  Platelet count-Automate 169  RBC Count 2.27  Red Cell Distrib Width13.5  WBC Count11.92  % Albumin, Urine--  Hematocrit 21.4  Hemoglobin 7.1  CBC  Date: 11-21-21 @ 05:38  Mean cell Hiyuvzqhzl85.7  Mean cell Hemoglobin Conc33.6  Mean cell Volum 94.4  Platelet count-Automate 198  RBC Count 3.22  Red Cell Distrib Width13.3  WBC Count20.86  % Albumin, Urine--  Hematocrit 30.4  Hemoglobin 10.2    [facility]  11-24-21 @ 07:07  Blood Gas Arterial-Calcium,Ionized--  Blood Urea Nitrogen, Serum 71 mg/dL<HH> [10 - 20] [Critical value:]  Carbon Dioxide, Serum19 mmol/L [17 - 32]  Chloride, Serum97 mmol/L<L> [98 - 110]  Creatinie, Serum3.3 mg/dL<H> [0.7 - 1.5]  Glucose, Itoju925 mg/dL<H> [70 - 99]  Potassium, Serum4.2 mmol/L [3.5 - 5.0]  Sodium, Serum 131 mmol/L<L> [135 - 146]  [facility]  11-23-21 @ 18:38  Blood Gas Arterial-Calcium,Ionized--  Blood Urea Nitrogen, Serum 69 mg/dL<HH> [10 - 20] [Critical value:]  Carbon Dioxide, Serum19 mmol/L [17 - 32]  Chloride, Serum95 mmol/L<L> [98 - 110]  Creatinie, Serum3.7 mg/dL<H> [0.7 - 1.5]  Glucose, Izjcw576 mg/dL<H> [70 - 99]  Potassium, Serum4.4 mmol/L [3.5 - 5.0]  Sodium, Serum 132 mmol/L<L> [135 - 146]        Medications:  acetaminophen     Tablet .. 650 milliGRAM(s) Oral every 6 hours PRN  atorvastatin 10 milliGRAM(s) Oral at bedtime  bisacodyl Suppository 10 milliGRAM(s) Rectal daily PRN  folic acid 1 milliGRAM(s) Oral daily  gabapentin 300 milliGRAM(s) Oral three times a day  levothyroxine 150 MICROGram(s) Oral daily  magnesium hydroxide Suspension 30 milliLiter(s) Oral daily PRN  metoprolol tartrate 25 milliGRAM(s) Oral two times a day  nystatin Cream 1 Application(s) Topical two times a day  ondansetron Injectable 4 milliGRAM(s) IV Push every 6 hours PRN  oxyCODONE    IR 5 milliGRAM(s) Oral every 4 hours PRN  pantoprazole    Tablet 40 milliGRAM(s) Oral before breakfast  sodium chloride 0.9%. 1000 milliLiter(s) IV Continuous <Continuous>        Assessment and Plan:  Patient is an 89 y/o Female presented s/p mechnical fall. found to have afib with RVR , acute  left intertrochanteric femur fx Course complicated by SAVANNAH    Left intertrochanteric femur fracture 2/2 mechanical fall  - s/p ORIF by ortho   - Pain control as needed  - PT/OT/Physiatry     SAVANNAH- renal function slightly improved  - pt retained 700cc urine, likely from anethesia and immobility  - maintain  lenz   - monitor renal function, gentle IVF     Anemia with h/h drop, no gross bleeding events, normal iron level  -11/24  will transfuse 1 unit PRBC, post BT- 8  - monitor CBC      New onset paroxysmal Afib with RVR, CHADS VASC 3 (AGE/Sex)  - hold all a/c tx. due to anemia   - c/w cardizem    HLD  - Continue Atorvastatin     Hypothyroidism  - Continue Synthroid    Leukocytosis - resolved       DVT ppx: SCD's   GI ppx: Protonix  Activity: Activity as tolerated  Diet: DASH    #Progress Note Handoff: f/up CBC, hold a/c tx till tomorrow, monitor daily renal function   Family discussion: yes, medical team Disposition: STR once medically stable    Total time spent to complete patient's bedside assessment, review medical chart, discuss medical plan of care with covering medical team was more than 35 minutes .

## 2021-11-25 NOTE — PROGRESS NOTE ADULT - SUBJECTIVE AND OBJECTIVE BOX
ORTHOPAEDIC SURGERY PROGRESS NOTE    Patient seen and examined at bedside this morning. Pain well controlled. No new complaints.    LLE  Distal dressing c/i with moderate strikethrough, patient refusing dressing change x3, abdominal binder in place  Incisions c/d/i with staples in place  Firing EHL/FHL/GSC/TA  SILT DPN/SPN/Morfin/S/T  Foot wwp, DP pulse 2+    A&P  88yo F w/ L intertrochanteric hip fracture s/p IMN 11/20/21. Doing well on POD5. In setting of saturated dressings, will apply binder to aid in compression/ reduce drainage. Please page orthopaedics if patient is amenable to dressing change. Otherwise no orthopaedic contraindication to discharge.    -WBAT LLE  -DVT ppx  -IS  -pain control  -PT/OT  -please contact ortho with any questions or concerns   -upon discharge, please follow up with Dr. Staples at 2 weeks post-op, at [address] [facility]. Please call to schedule an appointment: [phone]

## 2021-11-26 LAB
ANION GAP SERPL CALC-SCNC: 14 MMOL/L — SIGNIFICANT CHANGE UP (ref 7–14)
BUN SERPL-MCNC: 48 MG/DL — HIGH (ref 10–20)
CALCIUM SERPL-MCNC: 8.9 MG/DL — SIGNIFICANT CHANGE UP (ref 8.5–10.1)
CHLORIDE SERPL-SCNC: 104 MMOL/L — SIGNIFICANT CHANGE UP (ref 98–110)
CO2 SERPL-SCNC: 18 MMOL/L — SIGNIFICANT CHANGE UP (ref 17–32)
CREAT SERPL-MCNC: 1.7 MG/DL — HIGH (ref 0.7–1.5)
GLUCOSE BLDC GLUCOMTR-MCNC: 123 MG/DL — HIGH (ref 70–99)
GLUCOSE BLDC GLUCOMTR-MCNC: 160 MG/DL — HIGH (ref 70–99)
GLUCOSE BLDC GLUCOMTR-MCNC: 98 MG/DL — SIGNIFICANT CHANGE UP (ref 70–99)
GLUCOSE SERPL-MCNC: 138 MG/DL — HIGH (ref 70–99)
HCT VFR BLD CALC: 25.3 % — LOW (ref 37–47)
HGB BLD-MCNC: 8.5 G/DL — LOW (ref 12–16)
MAGNESIUM SERPL-MCNC: 1.7 MG/DL — LOW (ref 1.8–2.4)
MCHC RBC-ENTMCNC: 32.3 PG — HIGH (ref 27–31)
MCHC RBC-ENTMCNC: 33.6 G/DL — SIGNIFICANT CHANGE UP (ref 32–37)
MCV RBC AUTO: 96.2 FL — SIGNIFICANT CHANGE UP (ref 81–99)
NRBC # BLD: 0 /100 WBCS — SIGNIFICANT CHANGE UP (ref 0–0)
PLATELET # BLD AUTO: 222 K/UL — SIGNIFICANT CHANGE UP (ref 130–400)
POTASSIUM SERPL-MCNC: 4.5 MMOL/L — SIGNIFICANT CHANGE UP (ref 3.5–5)
POTASSIUM SERPL-SCNC: 4.5 MMOL/L — SIGNIFICANT CHANGE UP (ref 3.5–5)
RBC # BLD: 2.63 M/UL — LOW (ref 4.2–5.4)
RBC # FLD: 14.2 % — SIGNIFICANT CHANGE UP (ref 11.5–14.5)
SODIUM SERPL-SCNC: 136 MMOL/L — SIGNIFICANT CHANGE UP (ref 135–146)
WBC # BLD: 9.63 K/UL — SIGNIFICANT CHANGE UP (ref 4.8–10.8)
WBC # FLD AUTO: 9.63 K/UL — SIGNIFICANT CHANGE UP (ref 4.8–10.8)

## 2021-11-26 PROCEDURE — 99233 SBSQ HOSP IP/OBS HIGH 50: CPT

## 2021-11-26 RX ORDER — APIXABAN 2.5 MG/1
5 TABLET, FILM COATED ORAL EVERY 12 HOURS
Refills: 0 | Status: DISCONTINUED | OUTPATIENT
Start: 2021-11-26 | End: 2021-11-26

## 2021-11-26 RX ORDER — APIXABAN 2.5 MG/1
5 TABLET, FILM COATED ORAL EVERY 12 HOURS
Refills: 0 | Status: DISCONTINUED | OUTPATIENT
Start: 2021-11-26 | End: 2021-12-02

## 2021-11-26 RX ORDER — MAGNESIUM SULFATE 500 MG/ML
2 VIAL (ML) INJECTION ONCE
Refills: 0 | Status: COMPLETED | OUTPATIENT
Start: 2021-11-26 | End: 2021-11-26

## 2021-11-26 RX ADMIN — GABAPENTIN 300 MILLIGRAM(S): 400 CAPSULE ORAL at 17:25

## 2021-11-26 RX ADMIN — Medication 25 GRAM(S): at 12:55

## 2021-11-26 RX ADMIN — Medication 1 MILLIGRAM(S): at 12:56

## 2021-11-26 RX ADMIN — PANTOPRAZOLE SODIUM 40 MILLIGRAM(S): 20 TABLET, DELAYED RELEASE ORAL at 07:02

## 2021-11-26 RX ADMIN — GABAPENTIN 300 MILLIGRAM(S): 400 CAPSULE ORAL at 07:01

## 2021-11-26 RX ADMIN — Medication 25 MILLIGRAM(S): at 07:02

## 2021-11-26 RX ADMIN — Medication 25 MILLIGRAM(S): at 18:56

## 2021-11-26 RX ADMIN — Medication 150 MICROGRAM(S): at 07:02

## 2021-11-26 RX ADMIN — NYSTATIN CREAM 1 APPLICATION(S): 100000 CREAM TOPICAL at 18:56

## 2021-11-26 NOTE — PROGRESS NOTE ADULT - ASSESSMENT
88 y/o Female presented s/p mechnical fall. found to have afib with RVR , acute  left intertrochanteric femur fx Course complicated by SAVANNAH    #Left intertrochanteric femur fracture 2/2 mechanical fall  - s/p ORIF by ortho   - Pain control as needed  - PT/OT/Physiatry     #SAVANNAH- renal function slightly improved  - pt retained 700cc urine, likely from anethesia and immobility  - maintain  lenz   - monitor renal function, gentle IVF  - f/u BMP (Cr)     #Anemia with h/h drop, no gross bleeding events, normal iron level  -11/24  will transfuse 1 unit PRBC, post BT- 8  - planing to start Eliquis if H+H stable  - monitor CBC      #New onset paroxysmal Afib with RVR, CHADS VASC 3 (AGE/Sex)  - hold all a/c tx. due to anemia   - c/w cardizem    #HLD  - Continue Atorvastatin     #Hypothyroidism  - Continue Synthroid    #Leukocytosis - resolved       DVT ppx: SCD's   GI ppx: Protonix  Activity: Activity as tolerated  Diet: DASH    Dispo: Anticipate for d/c tomorrow

## 2021-11-26 NOTE — PROGRESS NOTE ADULT - SUBJECTIVE AND OBJECTIVE BOX
MELANIA TRISTAN 89y Female  MRN#: [id number]   Hospital Day: 7d    HPI:  The patient is an 88 y/o female with a PMH of hypothyroidism, hyperlipidemia, osteoarthritis, lupus, and diabetes who presented to the ED after a fall. The patient was walking at home when she tripped and fell, landing on her left hip and side. She states that she was unable to get up after this happened. She denies losing consciousness. She is not any anticoagulation. The patient lives by herself  and ambulates with a walker at home at baseline. She reports that she is able to do most of her ADL's independently. She denies any other associated symptoms.    In the ED the patient's vitals were T98 /82 HR 64 SpO2 94% on RA. While in the ED the patient was noted to have tachycardia. An EKG was performed which showed that the patient was in new onset Afib with RVR. She was given one dose of Cardizem 10 IV which controlled the rate.      (19 Nov 2021 08:42)      SUBJECTIVE  Patient is a 89y old Female who presents with a chief complaint of Fall (26 Nov 2021 07:13)  Currently admitted to medicine with the primary diagnosis of Intertrochanteric fracture      INTERVAL HPI AND OVERNIGHT EVENTS:  Patient was examined and seen at bedside. This morning she is resting comfortably in bed and reports no issues or overnight events.    OBJECTIVE  PAST MEDICAL & SURGICAL HISTORY  DM (diabetes mellitus)    Osteoarthritis    Hypothyroidism    Lupus    Hyperlipidemia    History of hysterectomy    History of cholecystectomy    History of lumpectomy      ALLERGIES:  No Known Allergies    MEDICATIONS:  STANDING MEDICATIONS  atorvastatin 10 milliGRAM(s) Oral at bedtime  folic acid 1 milliGRAM(s) Oral daily  gabapentin 300 milliGRAM(s) Oral three times a day  levothyroxine 150 MICROGram(s) Oral daily  metoprolol tartrate 25 milliGRAM(s) Oral two times a day  nystatin Cream 1 Application(s) Topical two times a day  pantoprazole    Tablet 40 milliGRAM(s) Oral before breakfast  sodium chloride 0.9%. 1000 milliLiter(s) IV Continuous <Continuous>    PRN MEDICATIONS  acetaminophen     Tablet .. 650 milliGRAM(s) Oral every 6 hours PRN  bisacodyl Suppository 10 milliGRAM(s) Rectal daily PRN  magnesium hydroxide Suspension 30 milliLiter(s) Oral daily PRN  ondansetron Injectable 4 milliGRAM(s) IV Push every 6 hours PRN  oxyCODONE    IR 5 milliGRAM(s) Oral every 4 hours PRN      VITAL SIGNS: Last 24 Hours  T(C): 36.1 (26 Nov 2021 04:51), Max: 36.7 (25 Nov 2021 20:27)  T(F): 97 (26 Nov 2021 04:51), Max: 98 (25 Nov 2021 20:27)  HR: 80 (26 Nov 2021 04:51) (68 - 80)  BP: 150/66 (26 Nov 2021 04:51) (140/61 - 162/77)  BP(mean): --  RR: 18 (26 Nov 2021 04:51) (18 - 18)  SpO2: 94% (25 Nov 2021 17:22) (94% - 94%)    LABS:                        8.4    8.28  )-----------( 205      ( 25 Nov 2021 18:43 )             24.5     11-25    138  |  103  |  57<H>  ----------------------------<  131<H>  4.4   |  19  |  2.2<H>    Ca    8.6      25 Nov 2021 18:43    RADIOLOGY: No New Imaging      PHYSICAL EXAM:  CONSTITUTIONAL: No acute distress, well-developed, well-groomed, AAOx3  HEAD: Atraumatic, normocephalic  EYES: EOM intact, PERRLA, conjunctiva and sclera clear  ENT: Supple, no masses, no thyromegaly, no bruits, no JVD; moist mucous membranes  PULMONARY: Clear to auscultation bilaterally; no wheezes, rales, or rhonchi  CARDIOVASCULAR: Regular rate and rhythm; no murmurs, rubs, or gallops  GASTROINTESTINAL: Soft, non-tender, non-distended; bowel sounds present  MUSCULOSKELETAL: 2+ peripheral pulses; no clubbing, no cyanosis, no edema  NEUROLOGY: non-focal  SKIN: No rashes or lesions; warm and dry

## 2021-11-26 NOTE — PROGRESS NOTE ADULT - SUBJECTIVE AND OBJECTIVE BOX
ORTHOPAEDIC SURGERY PROGRESS NOTE    Patient seen and examined at bedside this morning. Pain well controlled. No new complaints.    Per nursing, patient's abdominal binder and dressings were soiled during her shift; nurse removed binder and changed dressings earlier in the evening.     LLE  Distal dressing c/i with moderate strikethrough, changed by orthopaedics today  Incisions c/d/i with staples in place  Firing EHL/FHL/GSC/TA  SILT DPN/SPN/Morfin/S/T  Foot wwp, DP pulse 2+    A&P  89yo F w/ L intertrochanteric hip fracture s/p IMN 11/20/21. Doing well on POD6. Due to persistent drainage, recommending continued use of abdominal binder.    -WBAT LLE  -DVT ppx  -IS  -pain control  -PT/OT  -please contact ortho with any questions or concerns   -upon discharge, please follow up with Dr. Staples at 2 weeks post-op, at [address] [facility]. Please call to schedule an appointment: [phone]

## 2021-11-27 LAB
ALBUMIN SERPL ELPH-MCNC: 3.1 G/DL — LOW (ref 3.5–5.2)
ALP SERPL-CCNC: 115 U/L — SIGNIFICANT CHANGE UP (ref 30–115)
ALT FLD-CCNC: <5 U/L — SIGNIFICANT CHANGE UP (ref 0–41)
ANION GAP SERPL CALC-SCNC: 14 MMOL/L — SIGNIFICANT CHANGE UP (ref 7–14)
AST SERPL-CCNC: 32 U/L — SIGNIFICANT CHANGE UP (ref 0–41)
BILIRUB SERPL-MCNC: 0.8 MG/DL — SIGNIFICANT CHANGE UP (ref 0.2–1.2)
BUN SERPL-MCNC: 38 MG/DL — HIGH (ref 10–20)
CALCIUM SERPL-MCNC: 9.4 MG/DL — SIGNIFICANT CHANGE UP (ref 8.5–10.1)
CHLORIDE SERPL-SCNC: 106 MMOL/L — SIGNIFICANT CHANGE UP (ref 98–110)
CO2 SERPL-SCNC: 21 MMOL/L — SIGNIFICANT CHANGE UP (ref 17–32)
CREAT SERPL-MCNC: 1.3 MG/DL — SIGNIFICANT CHANGE UP (ref 0.7–1.5)
GLUCOSE BLDC GLUCOMTR-MCNC: 100 MG/DL — HIGH (ref 70–99)
GLUCOSE BLDC GLUCOMTR-MCNC: 108 MG/DL — HIGH (ref 70–99)
GLUCOSE BLDC GLUCOMTR-MCNC: 118 MG/DL — HIGH (ref 70–99)
GLUCOSE BLDC GLUCOMTR-MCNC: 97 MG/DL — SIGNIFICANT CHANGE UP (ref 70–99)
GLUCOSE SERPL-MCNC: 116 MG/DL — HIGH (ref 70–99)
HCT VFR BLD CALC: 26.8 % — LOW (ref 37–47)
HGB BLD-MCNC: 8.6 G/DL — LOW (ref 12–16)
MAGNESIUM SERPL-MCNC: 1.6 MG/DL — LOW (ref 1.8–2.4)
MCHC RBC-ENTMCNC: 32 PG — HIGH (ref 27–31)
MCHC RBC-ENTMCNC: 32.1 G/DL — SIGNIFICANT CHANGE UP (ref 32–37)
MCV RBC AUTO: 99.6 FL — HIGH (ref 81–99)
NRBC # BLD: 0 /100 WBCS — SIGNIFICANT CHANGE UP (ref 0–0)
PLATELET # BLD AUTO: 231 K/UL — SIGNIFICANT CHANGE UP (ref 130–400)
POTASSIUM SERPL-MCNC: 4.5 MMOL/L — SIGNIFICANT CHANGE UP (ref 3.5–5)
POTASSIUM SERPL-SCNC: 4.5 MMOL/L — SIGNIFICANT CHANGE UP (ref 3.5–5)
PROT SERPL-MCNC: 5.4 G/DL — LOW (ref 6–8)
RBC # BLD: 2.69 M/UL — LOW (ref 4.2–5.4)
RBC # FLD: 14.5 % — SIGNIFICANT CHANGE UP (ref 11.5–14.5)
SODIUM SERPL-SCNC: 141 MMOL/L — SIGNIFICANT CHANGE UP (ref 135–146)
WBC # BLD: 8.26 K/UL — SIGNIFICANT CHANGE UP (ref 4.8–10.8)
WBC # FLD AUTO: 8.26 K/UL — SIGNIFICANT CHANGE UP (ref 4.8–10.8)

## 2021-11-27 PROCEDURE — 99233 SBSQ HOSP IP/OBS HIGH 50: CPT

## 2021-11-27 RX ORDER — HYDRALAZINE HCL 50 MG
10 TABLET ORAL ONCE
Refills: 0 | Status: COMPLETED | OUTPATIENT
Start: 2021-11-27 | End: 2021-11-27

## 2021-11-27 RX ORDER — MAGNESIUM SULFATE 500 MG/ML
2 VIAL (ML) INJECTION
Refills: 0 | Status: COMPLETED | OUTPATIENT
Start: 2021-11-27 | End: 2021-11-27

## 2021-11-27 RX ORDER — LABETALOL HCL 100 MG
10 TABLET ORAL ONCE
Refills: 0 | Status: COMPLETED | OUTPATIENT
Start: 2021-11-27 | End: 2021-11-27

## 2021-11-27 RX ADMIN — Medication 150 MICROGRAM(S): at 06:14

## 2021-11-27 RX ADMIN — Medication 25 GRAM(S): at 17:35

## 2021-11-27 RX ADMIN — Medication 25 MILLIGRAM(S): at 06:15

## 2021-11-27 RX ADMIN — Medication 10 MILLIGRAM(S): at 22:29

## 2021-11-27 RX ADMIN — Medication 25 MILLIGRAM(S): at 17:38

## 2021-11-27 RX ADMIN — NYSTATIN CREAM 1 APPLICATION(S): 100000 CREAM TOPICAL at 17:41

## 2021-11-27 RX ADMIN — Medication 25 GRAM(S): at 18:50

## 2021-11-27 RX ADMIN — Medication 1 MILLIGRAM(S): at 11:13

## 2021-11-27 RX ADMIN — GABAPENTIN 300 MILLIGRAM(S): 400 CAPSULE ORAL at 14:08

## 2021-11-27 RX ADMIN — GABAPENTIN 300 MILLIGRAM(S): 400 CAPSULE ORAL at 06:15

## 2021-11-27 RX ADMIN — NYSTATIN CREAM 1 APPLICATION(S): 100000 CREAM TOPICAL at 06:22

## 2021-11-27 RX ADMIN — APIXABAN 5 MILLIGRAM(S): 2.5 TABLET, FILM COATED ORAL at 21:37

## 2021-11-27 RX ADMIN — GABAPENTIN 300 MILLIGRAM(S): 400 CAPSULE ORAL at 21:36

## 2021-11-27 RX ADMIN — APIXABAN 5 MILLIGRAM(S): 2.5 TABLET, FILM COATED ORAL at 11:14

## 2021-11-27 RX ADMIN — APIXABAN 5 MILLIGRAM(S): 2.5 TABLET, FILM COATED ORAL at 00:09

## 2021-11-27 RX ADMIN — SODIUM CHLORIDE 75 MILLILITER(S): 9 INJECTION INTRAMUSCULAR; INTRAVENOUS; SUBCUTANEOUS at 11:14

## 2021-11-27 RX ADMIN — ATORVASTATIN CALCIUM 10 MILLIGRAM(S): 80 TABLET, FILM COATED ORAL at 21:36

## 2021-11-27 RX ADMIN — Medication 650 MILLIGRAM(S): at 06:28

## 2021-11-27 RX ADMIN — PANTOPRAZOLE SODIUM 40 MILLIGRAM(S): 20 TABLET, DELAYED RELEASE ORAL at 06:14

## 2021-11-27 RX ADMIN — Medication 10 MILLIGRAM(S): at 17:36

## 2021-11-27 NOTE — PROGRESS NOTE ADULT - ASSESSMENT
89 y/o Female presented s/p mechnical fall. found to have afib with RVR , acute  left intertrochanteric femur fx Course complicated by SAVANNAH    #Left intertrochanteric femur fracture 2/2 mechanical fall  - s/p ORIF by ortho   - Pain control as needed (tylenol PRN, Oxy 5 IR PRN)  - PT/OT/Physiatry     #SAVANNAH- renal function slightly improved  - pt retained 700cc urine, likely from anethesia and immobility s/p lenz  - 11/27 am d/c lenz for TOV  - monitor renal function, gentle IVF  - f/u BMP (Cr)     #Anemia with h/h drop, no gross bleeding events, normal iron level  -11/24  will transfuse 1 unit PRBC, post BT- 8  - back on home eliquis as on 11/26 PM  - monitor CBC    #New onset paroxysmal Afib with RVR, CHADS VASC 3 (AGE/Sex)  - back on AC, monitor hgb  - c/w cardizem    #HLD  - Continue Atorvastatin     #Hypothyroidism  - Continue Synthroid    #Leukocytosis - resolved       DVT ppx: Eliquis  GI ppx: Protonix  Activity: Activity as tolerated  Diet: DASH    Dispo: 3A, hopefully d/c 24-48 hours if stable will monitor since recently back on home eliquis

## 2021-11-27 NOTE — PROGRESS NOTE ADULT - ATTENDING COMMENTS
Orthopedic Attending    Patient seen and examined with resident and/or PA.  All new laboratory work-up and consults reviewed.  All new radiographs were reviewed.   Agree with findings, assessment and plan.  consider Ancef or Keflex; hold anticoagulation and compressive dressing.   will follow wound.
rehab  OOB  discharge planning
watch dressing   oob  rehab
Orthopedic Attending    Patient seen and examined with resident and/or PA.  All new laboratory work-up and consults reviewed.  All new radiographs were reviewed.   Agree with findings, assessment and plan.
Orthopedic Attending    Patient seen and examined with resident and/or PA.  All new laboratory work-up and consults reviewed.  All new radiographs were reviewed.   Agree with findings, assessment and plan.
Patient is an 89 y/o Female presented s/p mechnical fall. found to have afib with RVR , acute  left intertrochanteric femur fx Course complicated by SAVANNAH    Left intertrochanteric femur fracture 2/2 mechanical fall  - s/p ORIF by ortho   - Pain control as needed  - PT/OT/Physiatry     SAVANNAH- renal function slightly improved  - pt retained 700cc urine, likely from anethesia and immobility  -11/26- TOV today    - monitor renal function     Anemia with h/h drop, no gross bleeding events, normal iron level  -11/24  s/p 1 unit PRBC BT , 11/26 hg- 8.5  - monitor CBC      New onset paroxysmal Afib with RVR, CHADS VASC 3 (AGE/Sex)  - restarted on  a/c tx  - c/w cardizem    HLD  - Continue Atorvastatin     Hypothyroidism  - Continue Synthroid    Leukocytosis - resolved       DVT ppx: SCD's   GI ppx: Protonix  Activity: Activity as tolerated  Diet: DASH    #Progress Note Handoff: f/up CBC, restart a/c tx , monitor daily renal function , TOV  Family discussion: yes, medical team Disposition: STR once medically stable    Total time spent to complete patient's bedside assessment, review medical chart, discuss medical plan of care with covering medical team was more than 35 minutes .
Patient is an 89 y/o Female presented s/p mechnical fall. found to have afib with RVR , acute  left intertrochanteric femur fx Course complicated by SAVANNAH    Left intertrochanteric femur fracture 2/2 mechanical fall  - s/p ORIF by ortho   - Pain control as needed  - PT/OT/Physiatry     SAVANNAH- renal function slightly improved  - pt retained 700cc urine, likely from anethesia and immobility  -11/26- TOV today  , d/c IVF  - monitor renal function     Anemia with h/h drop, no gross bleeding events, normal iron level  -11/24  s/p 1 unit PRBC BT , hg- 8.5  - monitor CBC      New onset paroxysmal Afib with RVR, CHADS VASC 3 (AGE/Sex)  - restarted on  a/c tx  - c/w cardizem    HLD  - Continue Atorvastatin     Hypothyroidism  - Continue Synthroid    Leukocytosis - resolved       DVT ppx: SCD's   GI ppx: Protonix  Activity: Activity as tolerated  Diet: DASH    #Progress Note Handoff: f/up CBC, restart a/c tx today , monitor daily renal function , TOV  Family discussion: yes, medical team Disposition: STR once medically stable    Total time spent to complete patient's bedside assessment, review medical chart, discuss medical plan of care with covering medical team was more than 35 minutes .
Patient is an 89 y/o Female presented s/p mechnical fall. found to have afib with RVR , acute  left intertrochanteric femur fx Course complicated by SAVANNAH    Left intertrochanteric femur fracture 2/2 mechanical fall  - s/p ORIF by ortho   - Pain control as needed  - PT/OT/Physiatry     SAVANNAH- renal function slightly improved  - pt retained 700cc urine, likely from anethesia and immobility  - maintain  lenz   - monitor renal function, gentle IVF     Anemia with h/h drop, no gross bleeding events, normal iron level  -11/24  will transfuse 1 unit PRBC, next h/h at 4 pm today       New onset paroxysmal Afib with RVR, CHADS VASC 3 (AGE/Sex)  - hold all a/c tx. due to anemia   - c/w cardizem    HLD  - Continue Atorvastatin     Hypothyroidism  - Continue Synthroid    Leukocytosis - resolved       DVT ppx: SCD's   GI ppx: Protonix  Activity: Activity as tolerated  Diet: DASH    #Progress Note Handoff: give 1 unit PRBC, next h/h at 4 pm, hold a/c tx, monitor daily renal function   Family discussion: yes, medical team Disposition: STR once medically stable    Total time spent to complete patient's bedside assessment, review medical chart, discuss medical plan of care with covering medical team was more than 35 minutes

## 2021-11-27 NOTE — PROGRESS NOTE ADULT - SUBJECTIVE AND OBJECTIVE BOX
MELANIA TRISTAN 89y Female  MRN#: [id number]   Hospital Day: 8d    HPI:  The patient is an 89 y/o female with a PMH of hypothyroidism, hyperlipidemia, osteoarthritis, lupus, and diabetes who presented to the ED after a fall. The patient was walking at home when she tripped and fell, landing on her left hip and side. She states that she was unable to get up after this happened. She denies losing consciousness. She is not any anticoagulation. The patient lives by herself  and ambulates with a walker at home at baseline. She reports that she is able to do most of her ADL's independently. She denies any other associated symptoms.    In the ED the patient's vitals were T98 /82 HR 64 SpO2 94% on RA. While in the ED the patient was noted to have tachycardia. An EKG was performed which showed that the patient was in new onset Afib with RVR. She was given one dose of Cardizem 10 IV which controlled the rate.      (19 Nov 2021 08:42)      SUBJECTIVE  Patient is a 89y old Female who presents with a chief complaint of Fall (26 Nov 2021 08:58)  Currently admitted to medicine with the primary diagnosis of Intertrochanteric fracture      INTERVAL HPI AND OVERNIGHT EVENTS:  Patient was examined and seen at bedside. This morning she is resting comfortably in bed and reports no issues or overnight events.    OBJECTIVE  PAST MEDICAL & SURGICAL HISTORY  DM (diabetes mellitus)    Osteoarthritis    Hypothyroidism    Lupus    Hyperlipidemia    History of hysterectomy    History of cholecystectomy    History of lumpectomy      ALLERGIES:  No Known Allergies    MEDICATIONS:  STANDING MEDICATIONS  apixaban 5 milliGRAM(s) Oral every 12 hours  atorvastatin 10 milliGRAM(s) Oral at bedtime  folic acid 1 milliGRAM(s) Oral daily  gabapentin 300 milliGRAM(s) Oral three times a day  levothyroxine 150 MICROGram(s) Oral daily  metoprolol tartrate 25 milliGRAM(s) Oral two times a day  nystatin Cream 1 Application(s) Topical two times a day  pantoprazole    Tablet 40 milliGRAM(s) Oral before breakfast  sodium chloride 0.9%. 1000 milliLiter(s) IV Continuous <Continuous>    PRN MEDICATIONS  acetaminophen     Tablet .. 650 milliGRAM(s) Oral every 6 hours PRN  bisacodyl Suppository 10 milliGRAM(s) Rectal daily PRN  magnesium hydroxide Suspension 30 milliLiter(s) Oral daily PRN  ondansetron Injectable 4 milliGRAM(s) IV Push every 6 hours PRN  oxyCODONE    IR 5 milliGRAM(s) Oral every 4 hours PRN      VITAL SIGNS: Last 24 Hours  T(C): 35.9 (27 Nov 2021 05:48), Max: 36.3 (26 Nov 2021 14:59)  T(F): 96.7 (27 Nov 2021 05:48), Max: 97.3 (26 Nov 2021 14:59)  HR: 74 (27 Nov 2021 05:48) (71 - 75)  BP: 182/73 (27 Nov 2021 05:48) (149/66 - 182/73)  BP(mean): --  RR: 18 (27 Nov 2021 05:48) (18 - 18)  SpO2: 99% (27 Nov 2021 05:48) (99% - 99%)    LABS:                        8.5    9.63  )-----------( 222      ( 26 Nov 2021 04:30 )             25.3     11-26    136  |  104  |  48<H>  ----------------------------<  138<H>  4.5   |  18  |  1.7<H>    Ca    8.9      26 Nov 2021 04:30  Mg     1.7     11-26      RADIOLOGY: No New Imaging      PHYSICAL EXAM:  CONSTITUTIONAL: No acute distress, well-developed, well-groomed, AAOx3  HEAD: Atraumatic, normocephalic  EYES: EOM intact, PERRLA, conjunctiva and sclera clear  ENT: Supple, no masses, no thyromegaly, no bruits, no JVD; moist mucous membranes  PULMONARY: Clear to auscultation bilaterally; no wheezes, rales, or rhonchi  CARDIOVASCULAR: Regular rate; no murmurs, rubs, or gallops  GASTROINTESTINAL: Soft, non-tender, non-distended; bowel sounds present  MUSCULOSKELETAL: 2+ peripheral pulses; no clubbing, no cyanosis, no edema  NEUROLOGY: non-focal  SKIN:  warm and dry

## 2021-11-28 LAB
ANION GAP SERPL CALC-SCNC: 16 MMOL/L — HIGH (ref 7–14)
BUN SERPL-MCNC: 33 MG/DL — HIGH (ref 10–20)
CALCIUM SERPL-MCNC: 9.3 MG/DL — SIGNIFICANT CHANGE UP (ref 8.5–10.1)
CHLORIDE SERPL-SCNC: 105 MMOL/L — SIGNIFICANT CHANGE UP (ref 98–110)
CO2 SERPL-SCNC: 17 MMOL/L — SIGNIFICANT CHANGE UP (ref 17–32)
CREAT SERPL-MCNC: 1.1 MG/DL — SIGNIFICANT CHANGE UP (ref 0.7–1.5)
GLUCOSE BLDC GLUCOMTR-MCNC: 113 MG/DL — HIGH (ref 70–99)
GLUCOSE BLDC GLUCOMTR-MCNC: 138 MG/DL — HIGH (ref 70–99)
GLUCOSE BLDC GLUCOMTR-MCNC: 142 MG/DL — HIGH (ref 70–99)
GLUCOSE BLDC GLUCOMTR-MCNC: 146 MG/DL — HIGH (ref 70–99)
GLUCOSE SERPL-MCNC: 111 MG/DL — HIGH (ref 70–99)
HCT VFR BLD CALC: 28.9 % — LOW (ref 37–47)
HGB BLD-MCNC: 9.2 G/DL — LOW (ref 12–16)
MAGNESIUM SERPL-MCNC: 2 MG/DL — SIGNIFICANT CHANGE UP (ref 1.8–2.4)
MCHC RBC-ENTMCNC: 31.8 G/DL — LOW (ref 32–37)
MCHC RBC-ENTMCNC: 31.8 PG — HIGH (ref 27–31)
MCV RBC AUTO: 100 FL — HIGH (ref 81–99)
NRBC # BLD: 0 /100 WBCS — SIGNIFICANT CHANGE UP (ref 0–0)
PLATELET # BLD AUTO: 185 K/UL — SIGNIFICANT CHANGE UP (ref 130–400)
POTASSIUM SERPL-MCNC: 5.4 MMOL/L — HIGH (ref 3.5–5)
POTASSIUM SERPL-SCNC: 5.4 MMOL/L — HIGH (ref 3.5–5)
RBC # BLD: 2.89 M/UL — LOW (ref 4.2–5.4)
RBC # FLD: 14.7 % — HIGH (ref 11.5–14.5)
SODIUM SERPL-SCNC: 138 MMOL/L — SIGNIFICANT CHANGE UP (ref 135–146)
WBC # BLD: 8.9 K/UL — SIGNIFICANT CHANGE UP (ref 4.8–10.8)
WBC # FLD AUTO: 8.9 K/UL — SIGNIFICANT CHANGE UP (ref 4.8–10.8)

## 2021-11-28 PROCEDURE — 99233 SBSQ HOSP IP/OBS HIGH 50: CPT

## 2021-11-28 RX ORDER — AMLODIPINE BESYLATE 2.5 MG/1
10 TABLET ORAL DAILY
Refills: 0 | Status: DISCONTINUED | OUTPATIENT
Start: 2021-11-29 | End: 2021-12-02

## 2021-11-28 RX ORDER — AMLODIPINE BESYLATE 2.5 MG/1
5 TABLET ORAL ONCE
Refills: 0 | Status: COMPLETED | OUTPATIENT
Start: 2021-11-28 | End: 2021-11-28

## 2021-11-28 RX ORDER — HYDRALAZINE HCL 50 MG
10 TABLET ORAL EVERY 6 HOURS
Refills: 0 | Status: DISCONTINUED | OUTPATIENT
Start: 2021-11-28 | End: 2021-12-02

## 2021-11-28 RX ORDER — HYDRALAZINE HCL 50 MG
10 TABLET ORAL ONCE
Refills: 0 | Status: COMPLETED | OUTPATIENT
Start: 2021-11-28 | End: 2021-11-28

## 2021-11-28 RX ORDER — AMLODIPINE BESYLATE 2.5 MG/1
5 TABLET ORAL DAILY
Refills: 0 | Status: DISCONTINUED | OUTPATIENT
Start: 2021-11-28 | End: 2021-11-28

## 2021-11-28 RX ADMIN — NYSTATIN CREAM 1 APPLICATION(S): 100000 CREAM TOPICAL at 05:26

## 2021-11-28 RX ADMIN — APIXABAN 5 MILLIGRAM(S): 2.5 TABLET, FILM COATED ORAL at 21:25

## 2021-11-28 RX ADMIN — Medication 25 MILLIGRAM(S): at 17:57

## 2021-11-28 RX ADMIN — AMLODIPINE BESYLATE 5 MILLIGRAM(S): 2.5 TABLET ORAL at 11:44

## 2021-11-28 RX ADMIN — GABAPENTIN 300 MILLIGRAM(S): 400 CAPSULE ORAL at 21:25

## 2021-11-28 RX ADMIN — Medication 10 MILLIGRAM(S): at 01:00

## 2021-11-28 RX ADMIN — GABAPENTIN 300 MILLIGRAM(S): 400 CAPSULE ORAL at 05:26

## 2021-11-28 RX ADMIN — Medication 1 MILLIGRAM(S): at 11:44

## 2021-11-28 RX ADMIN — Medication 150 MICROGRAM(S): at 05:26

## 2021-11-28 RX ADMIN — APIXABAN 5 MILLIGRAM(S): 2.5 TABLET, FILM COATED ORAL at 11:44

## 2021-11-28 RX ADMIN — GABAPENTIN 300 MILLIGRAM(S): 400 CAPSULE ORAL at 14:58

## 2021-11-28 RX ADMIN — ATORVASTATIN CALCIUM 10 MILLIGRAM(S): 80 TABLET, FILM COATED ORAL at 21:25

## 2021-11-28 RX ADMIN — NYSTATIN CREAM 1 APPLICATION(S): 100000 CREAM TOPICAL at 17:57

## 2021-11-28 RX ADMIN — AMLODIPINE BESYLATE 5 MILLIGRAM(S): 2.5 TABLET ORAL at 15:06

## 2021-11-28 RX ADMIN — Medication 25 MILLIGRAM(S): at 05:26

## 2021-11-28 RX ADMIN — PANTOPRAZOLE SODIUM 40 MILLIGRAM(S): 20 TABLET, DELAYED RELEASE ORAL at 05:26

## 2021-11-28 NOTE — PROGRESS NOTE ADULT - SUBJECTIVE AND OBJECTIVE BOX
MELANIA TRISTAN  [facility] T1-3A 012 A ([facility] T1-3A)      Patient was evaluated and examined  by bedside, no active complains      REVIEW OF SYSTEMS:  please see pertinent positives mentioned above, all other 12 ROS negative      T(C): , Max: 36.4 (11-27-21 @ 20:59)  HR: 71 (11-28-21 @ 05:56)  BP: 198/72 (11-28-21 @ 05:56)  RR: 18 (11-27-21 @ 14:32)  SpO2: --  CAPILLARY BLOOD GLUCOSE      POCT Blood Glucose.: 113 mg/dL (28 Nov 2021 07:45)  POCT Blood Glucose.: 108 mg/dL (27 Nov 2021 21:15)  POCT Blood Glucose.: 100 mg/dL (27 Nov 2021 16:56)  POCT Blood Glucose.: 118 mg/dL (27 Nov 2021 11:51)      PHYSICAL EXAM:  General: NAD, AAOX3, patient is laying comfortably in bed  HEENT: AT, NC, Supple, NO JVD, NO CB  Lungs: good breath sounds  B/L, no wheezing, no rhonchi  CVS: normal S1, S2, RRR, NO M/G/R  Abdomen: soft, bowel sounds present, non-tender, non-distended  Extremities: left hip post op edema , incision covered with dressing, no clubbing, no cyanosis, positive peripheral pulses b/l  Neuro: no acute focal neurological deficits, generalized body weakness  Skin: no rash, no ecchymosis        LAB  CBC  Date: 11-27-21 @ 11:00  Mean cell Femximhkgq55.0  Mean cell Hemoglobin Conc32.1  Mean cell Volum 99.6  Platelet count-Automate 231  RBC Count 2.69  Red Cell Distrib Width14.5  WBC Count8.26  % Albumin, Urine--  Hematocrit 26.8  Hemoglobin 8.6  CBC  Date: 11-26-21 @ 04:30  Mean cell Kvxftjbmbq77.3  Mean cell Hemoglobin Conc33.6  Mean cell Volum 96.2  Platelet count-Automate 222  RBC Count 2.63  Red Cell Distrib Width14.2  WBC Count9.63  % Albumin, Urine--  Hematocrit 25.3  Hemoglobin 8.5  CBC  Date: 11-25-21 @ 18:43  Mean cell Lwguhncdii10.8  Mean cell Hemoglobin Conc34.3  Mean cell Volum 95.7  Platelet count-Automate 205  RBC Count 2.56  Red Cell Distrib Width14.0  WBC Count8.28  % Albumin, Urine--  Hematocrit 24.5  Hemoglobin 8.4  CBC  Date: 11-24-21 @ 16:00  Mean cell Uahrnmotcc25.0  Mean cell Hemoglobin Conc32.8  Mean cell Volum 97.6  Platelet count-Automate 167  RBC Count 2.50  Red Cell Distrib Width13.9  WBC Count7.69  % Albumin, Urine--  Hematocrit 24.4  Hemoglobin 8.0  CBC  Date: 11-24-21 @ 12:04  Mean cell Fyfupvhneu65.8  Mean cell Hemoglobin Conc33.3  Mean cell Volum 95.3  Platelet count-Automate 148  RBC Count 2.14  Red Cell Distrib Width13.9  WBC Count7.36  % Albumin, Urine--  Hematocrit 20.4  Hemoglobin 6.8  CBC  Date: 11-24-21 @ 07:07  Mean cell Jhnzwzhgvc07.7  Mean cell Hemoglobin Conc33.7  Mean cell Volum 94.2  Platelet count-Automate 171  RBC Count 2.08  Red Cell Distrib Width13.6  WBC Count7.60  % Albumin, Urine--  Hematocrit 19.6  Hemoglobin 6.6  CBC  Date: 11-24-21 @ 01:00  Mean cell Gawcocudqu11.1  Mean cell Hemoglobin Conc34.0  Mean cell Volum 94.6  Platelet count-Automate 154  RBC Count 2.21  Red Cell Distrib Width13.7  WBC Count9.08  % Albumin, Urine--  Hematocrit 20.9  Hemoglobin 7.1  CBC  Date: 11-23-21 @ 06:26  Mean cell Uyugalhteo43.3  Mean cell Hemoglobin Conc33.2  Mean cell Volum 94.3  Platelet count-Automate 169  RBC Count 2.27  Red Cell Distrib Width13.5  WBC Count11.92  % Albumin, Urine--  Hematocrit 21.4  Hemoglobin 7.1    [facility]  11-27-21 @ 11:00  Blood Gas Arterial-Calcium,Ionized--  Blood Urea Nitrogen, Serum 38 mg/dL<H> [10 - 20]  Carbon Dioxide, Serum21 mmol/L [17 - 32]  Chloride, Cssgq288 mmol/L [98 - 110]  Creatinie, Serum1.3 mg/dL [0.7 - 1.5]  Glucose, Zrvhr148 mg/dL<H> [70 - 99]  Potassium, Serum4.5 mmol/L [3.5 - 5.0]  Sodium, Serum 141 mmol/L [135 - 146]  [facility]  11-26-21 @ 04:30  Blood Gas Arterial-Calcium,Ionized--  Blood Urea Nitrogen, Serum 48 mg/dL<H> [10 - 20]  Carbon Dioxide, Serum18 mmol/L [17 - 32]  Chloride, Gjcnr364 mmol/L [98 - 110]  Creatinie, Serum1.7 mg/dL<H> [0.7 - 1.5]  Glucose, Jysct053 mg/dL<H> [70 - 99]  Potassium, Serum4.5 mmol/L [3.5 - 5.0]  Sodium, Serum 136 mmol/L [135 - 146]  [facility]  11-25-21 @ 18:43  Blood Gas Arterial-Calcium,Ionized--  Blood Urea Nitrogen, Serum 57 mg/dL<H> [10 - 20]  Carbon Dioxide, Serum19 mmol/L [17 - 32]  Chloride, Xnoea566 mmol/L [98 - 110]  Creatinie, Serum2.2 mg/dL<H> [0.7 - 1.5]  Glucose, Yajfn316 mg/dL<H> [70 - 99]  Potassium, Serum4.4 mmol/L [3.5 - 5.0]  Sodium, Serum 138 mmol/L [135 - 146]  [facility]  11-24-21 @ 07:07  Blood Gas Arterial-Calcium,Ionized--  Blood Urea Nitrogen, Serum 71 mg/dL<HH> [10 - 20] [Critical value:]  Carbon Dioxide, Serum19 mmol/L [17 - 32]  Chloride, Serum97 mmol/L<L> [98 - 110]  Creatinie, Serum3.3 mg/dL<H> [0.7 - 1.5]  Glucose, Lvfcr745 mg/dL<H> [70 - 99]  Potassium, Serum4.2 mmol/L [3.5 - 5.0]  Sodium, Serum 131 mmol/L<L> [135 - 146]  [facility]  11-23-21 @ 18:38  Blood Gas Arterial-Calcium,Ionized--  Blood Urea Nitrogen, Serum 69 mg/dL<HH> [10 - 20] [Critical value:]  Carbon Dioxide, Serum19 mmol/L [17 - 32]  Chloride, Serum95 mmol/L<L> [98 - 110]  Creatinie, Serum3.7 mg/dL<H> [0.7 - 1.5]  Glucose, Wjfzn029 mg/dL<H> [70 - 99]  Potassium, Serum4.4 mmol/L [3.5 - 5.0]  Sodium, Serum 132 mmol/L<L> [135 - 146]      Medications:  acetaminophen     Tablet .. 650 milliGRAM(s) Oral every 6 hours PRN  amLODIPine   Tablet 5 milliGRAM(s) Oral daily  apixaban 5 milliGRAM(s) Oral every 12 hours  atorvastatin 10 milliGRAM(s) Oral at bedtime  bisacodyl Suppository 10 milliGRAM(s) Rectal daily PRN  folic acid 1 milliGRAM(s) Oral daily  gabapentin 300 milliGRAM(s) Oral three times a day  hydrALAZINE Injectable 10 milliGRAM(s) IV Push every 6 hours PRN  levothyroxine 150 MICROGram(s) Oral daily  magnesium hydroxide Suspension 30 milliLiter(s) Oral daily PRN  metoprolol tartrate 25 milliGRAM(s) Oral two times a day  nystatin Cream 1 Application(s) Topical two times a day  ondansetron Injectable 4 milliGRAM(s) IV Push every 6 hours PRN  pantoprazole    Tablet 40 milliGRAM(s) Oral before breakfast        Assessment and Plan:  Patient is an 88 y/o Female presented s/p mechnical fall. found to have afib with RVR , acute  left intertrochanteric femur fx Course complicated by SAVANNAH    Left intertrochanteric femur fracture 2/2 mechanical fall  - s/p ORIF by ortho   - Pain control as needed  - PT/OT/Physiatry     SAVANNAH- renal function has  improved  - pt retained 700cc urine, likely from anethesia and immobility  -11/26- passed  TOV    - monitor renal function  -d/c IVF    HTN- accelerated- started on Norvasc 5 mg po once daily, continue Metoprolol 25 mg po twice daily  - Hydralazine 10 mg IV every 8 hours prn for  or above       Anemia with h/h drop, no gross bleeding events, normal iron level  -11/24  s/p 1 unit PRBC BT , 11/27  hg- 8.6  - monitor CBC      New onset paroxysmal Afib with RVR, CHADS VASC 3 (AGE/Sex)  - restarted on  a/c tx  - c/w cardizem    HLD  - Continue Atorvastatin     Hypothyroidism  - Continue Synthroid    Leukocytosis - resolved       DVT ppx: SCD's   GI ppx: Protonix  Activity: Activity as tolerated  Diet: DASH    #Progress Note Handoff: monitor b/p with meds adjustment, PT eval, f/up CBC, BMP , passed TOV  Family discussion: yes, medical team Disposition: STR in 24 hours.     Total time spent to complete patient's bedside assessment, review medical chart, discuss medical plan of care with covering medical team was more than 35 minutes .

## 2021-11-29 LAB
ANION GAP SERPL CALC-SCNC: 11 MMOL/L — SIGNIFICANT CHANGE UP (ref 7–14)
BUN SERPL-MCNC: 29 MG/DL — HIGH (ref 10–20)
CALCIUM SERPL-MCNC: 9.4 MG/DL — SIGNIFICANT CHANGE UP (ref 8.5–10.1)
CHLORIDE SERPL-SCNC: 103 MMOL/L — SIGNIFICANT CHANGE UP (ref 98–110)
CO2 SERPL-SCNC: 21 MMOL/L — SIGNIFICANT CHANGE UP (ref 17–32)
CREAT SERPL-MCNC: 1 MG/DL — SIGNIFICANT CHANGE UP (ref 0.7–1.5)
GLUCOSE BLDC GLUCOMTR-MCNC: 103 MG/DL — HIGH (ref 70–99)
GLUCOSE BLDC GLUCOMTR-MCNC: 110 MG/DL — HIGH (ref 70–99)
GLUCOSE BLDC GLUCOMTR-MCNC: 122 MG/DL — HIGH (ref 70–99)
GLUCOSE BLDC GLUCOMTR-MCNC: 122 MG/DL — HIGH (ref 70–99)
GLUCOSE SERPL-MCNC: 115 MG/DL — HIGH (ref 70–99)
HCT VFR BLD CALC: 27.3 % — LOW (ref 37–47)
HGB BLD-MCNC: 8.9 G/DL — LOW (ref 12–16)
MAGNESIUM SERPL-MCNC: 1.6 MG/DL — LOW (ref 1.8–2.4)
MCHC RBC-ENTMCNC: 32.2 PG — HIGH (ref 27–31)
MCHC RBC-ENTMCNC: 32.6 G/DL — SIGNIFICANT CHANGE UP (ref 32–37)
MCV RBC AUTO: 98.9 FL — SIGNIFICANT CHANGE UP (ref 81–99)
NRBC # BLD: 0 /100 WBCS — SIGNIFICANT CHANGE UP (ref 0–0)
PLATELET # BLD AUTO: 233 K/UL — SIGNIFICANT CHANGE UP (ref 130–400)
POTASSIUM SERPL-MCNC: 4.3 MMOL/L — SIGNIFICANT CHANGE UP (ref 3.5–5)
POTASSIUM SERPL-SCNC: 4.3 MMOL/L — SIGNIFICANT CHANGE UP (ref 3.5–5)
RBC # BLD: 2.76 M/UL — LOW (ref 4.2–5.4)
RBC # FLD: 14.6 % — HIGH (ref 11.5–14.5)
SARS-COV-2 RNA SPEC QL NAA+PROBE: SIGNIFICANT CHANGE UP
SODIUM SERPL-SCNC: 135 MMOL/L — SIGNIFICANT CHANGE UP (ref 135–146)
WBC # BLD: 10.21 K/UL — SIGNIFICANT CHANGE UP (ref 4.8–10.8)
WBC # FLD AUTO: 10.21 K/UL — SIGNIFICANT CHANGE UP (ref 4.8–10.8)

## 2021-11-29 PROCEDURE — 99232 SBSQ HOSP IP/OBS MODERATE 35: CPT

## 2021-11-29 RX ORDER — MAGNESIUM SULFATE 500 MG/ML
2 VIAL (ML) INJECTION ONCE
Refills: 0 | Status: COMPLETED | OUTPATIENT
Start: 2021-11-29 | End: 2021-11-29

## 2021-11-29 RX ORDER — MAGNESIUM OXIDE 400 MG ORAL TABLET 241.3 MG
400 TABLET ORAL ONCE
Refills: 0 | Status: DISCONTINUED | OUTPATIENT
Start: 2021-11-29 | End: 2021-11-29

## 2021-11-29 RX ADMIN — GABAPENTIN 300 MILLIGRAM(S): 400 CAPSULE ORAL at 14:52

## 2021-11-29 RX ADMIN — NYSTATIN CREAM 1 APPLICATION(S): 100000 CREAM TOPICAL at 17:43

## 2021-11-29 RX ADMIN — AMLODIPINE BESYLATE 10 MILLIGRAM(S): 2.5 TABLET ORAL at 05:13

## 2021-11-29 RX ADMIN — Medication 150 MICROGRAM(S): at 05:13

## 2021-11-29 RX ADMIN — Medication 25 MILLIGRAM(S): at 17:48

## 2021-11-29 RX ADMIN — APIXABAN 5 MILLIGRAM(S): 2.5 TABLET, FILM COATED ORAL at 22:02

## 2021-11-29 RX ADMIN — GABAPENTIN 300 MILLIGRAM(S): 400 CAPSULE ORAL at 05:13

## 2021-11-29 RX ADMIN — PANTOPRAZOLE SODIUM 40 MILLIGRAM(S): 20 TABLET, DELAYED RELEASE ORAL at 05:13

## 2021-11-29 RX ADMIN — Medication 25 GRAM(S): at 17:15

## 2021-11-29 RX ADMIN — APIXABAN 5 MILLIGRAM(S): 2.5 TABLET, FILM COATED ORAL at 11:52

## 2021-11-29 RX ADMIN — ATORVASTATIN CALCIUM 10 MILLIGRAM(S): 80 TABLET, FILM COATED ORAL at 22:02

## 2021-11-29 RX ADMIN — GABAPENTIN 300 MILLIGRAM(S): 400 CAPSULE ORAL at 22:02

## 2021-11-29 RX ADMIN — Medication 1 MILLIGRAM(S): at 11:51

## 2021-11-29 RX ADMIN — Medication 25 MILLIGRAM(S): at 05:13

## 2021-11-29 RX ADMIN — NYSTATIN CREAM 1 APPLICATION(S): 100000 CREAM TOPICAL at 05:13

## 2021-11-29 NOTE — PROGRESS NOTE ADULT - ASSESSMENT
Patient is an 89 y/o Female presented s/p mechnical fall. found to have afib with RVR , acute  left intertrochanteric femur fx Course complicated by SAVANNAH    # Left intertrochanteric femur fracture 2/2 mechanical fall  - s/p ORIF by ortho, f/u OP  - Pain control as needed  - PT/OT/Physiatry     # SAVANNAH- resolving  - pt retained 700cc urine, likely from anethesia and immobility  -11/26- passed  TOV    - monitor renal function  -d/c IVF    # HTN  - Norvasc 10 mg po once daily, Metoprolol 25 mg po twice daily    # macrocytic Anemia   - h/h drop, no gross bleeding events, normal iron level  -11/24  s/p 1 unit PRBC BT , 11/27  hg- 8.6  - monitor CBC    # New onset paroxysmal Afib with RVR, CHADS VASC 3 (AGE/Sex)  - started eliquis  - c/w cardizem  - f/u cardio OP    # HLD  - Continue Atorvastatin     # Hypothyroidism  - Continue Synthroid    # Leukocytosis - resolved       DVT ppx: eliquis  GI ppx: Protonix  Activity: Activity as tolerated  Diet: DASH    Dispo: pending auth for str   Patient is an 89 y/o Female presented s/p mechnical fall. found to have afib with RVR , acute  left intertrochanteric femur fx Course complicated by SAVANNAH    # Left intertrochanteric femur fracture 2/2 mechanical fall  - s/p ORIF by ortho, f/u OP  - Pain control as needed  - PT/OT/Physiatry     # SAVANNAH- resolving  - pt retained 700cc urine, likely from anethesia and immobility  -11/26- passed  TOV    - monitor renal function  -d/c IVF    # HTN  - Norvasc 10 mg po once daily, Metoprolol 25 mg po twice daily    # macrocytic Anemia   - h/h drop, no gross bleeding events, normal iron level  -11/24  s/p 1 unit PRBC BT , 11/27  hg- 8.6  - monitor CBC    # New onset paroxysmal Afib with RVR,   - CHADS VASC 3 (AGE/Sex)  - started eliquis  - c/w cardizem  - f/u cardio OP    # HLD  - Continue Atorvastatin     # Hypothyroidism  - Continue Synthroid    # Leukocytosis - resolved       DVT ppx: eliquis  GI ppx: Protonix  Activity: Activity as tolerated  Diet: DASH    Dispo: pending auth for str

## 2021-11-29 NOTE — PROGRESS NOTE ADULT - SUBJECTIVE AND OBJECTIVE BOX
LENGTH OF HOSPITAL STAY: 10d    CHIEF COMPLAINT:    Patient is a 89y old  Female who presents with a chief complaint of Fall (28 Nov 2021 10:38)      OVERNIGHT EVENTS:    FOLLOW UP:    HOSPITAL COURSE:    HPI:    HPI:  The patient is an 89 y/o female with a PMH of hypothyroidism, hyperlipidemia, osteoarthritis, lupus, and diabetes who presented to the ED after a fall. The patient was walking at home when she tripped and fell, landing on her left hip and side. She states that she was unable to get up after this happened. She denies losing consciousness. She is not any anticoagulation. The patient lives by herself  and ambulates with a walker at home at baseline. She reports that she is able to do most of her ADL's independently. She denies any other associated symptoms.    In the ED the patient's vitals were T98 /82 HR 64 SpO2 94% on RA. While in the ED the patient was noted to have tachycardia. An EKG was performed which showed that the patient was in new onset Afib with RVR. She was given one dose of Cardizem 10 IV which controlled the rate.      (19 Nov 2021 08:42)      ALLERGIES:    Allergies    No Known Allergies    Intolerances        PMHx:    PAST MEDICAL & SURGICAL HISTORY:  DM (diabetes mellitus)    Osteoarthritis    Hypothyroidism    Lupus    Hyperlipidemia    History of hysterectomy    History of cholecystectomy    History of lumpectomy        SOCIAL Hx:    PHYSICAL EXAM:    Gen: NAD, resting in bed  HEENT: Normocephalic, atraumatic  Neck: supple, no lymphadenopathy  CV: Regular rate & regular rhythm  Lungs: decreased BS at bases, no fremitus  Abdomen: Soft, BS present  Ext: Warm, well perfused  Neuro: non focal, awake  Skin: no rash, no erythema   LENGTH OF HOSPITAL STAY: 10d    CHIEF COMPLAINT:    Patient is a 89y old  Female who presents with a chief complaint of Fall (28 Nov 2021 10:38)      OVERNIGHT EVENTS:    None, pt still mild HTN, 168/71    FOLLOW UP:    - CBC for h/h stability; STR placement    HOSPITAL COURSE:    Pt tolerated ORIF 11/20 complicated by retention s/p lenz w/ passed TOV 11/26 and acute anemia s/p 1 unit pRBC 11/24. Pt w/ leukocytosis on arrival, resolved w/ supportive care. Apixaban was started for pt w/ new onset paroxysmal Afib, pt w/ no tachy events. Pt HTN meds were adjusted, now on norvasc 10 qd. Pt chronic conditions treated w/ supportive care and home medications; HLD continued atorvastatin, hypothyroid continued synthroid.     HPI:    The patient is an 89 y/o female with a PMH of hypothyroidism, hyperlipidemia, osteoarthritis, lupus, and diabetes who presented to the ED after a fall. The patient was walking at home when she tripped and fell, landing on her left hip and side. She states that she was unable to get up after this happened. She denies losing consciousness. She is not any anticoagulation. The patient lives by herself  and ambulates with a walker at home at baseline. She reports that she is able to do most of her ADL's independently. She denies any other associated symptoms.    In the ED the patient's vitals were T98 /82 HR 64 SpO2 94% on RA. While in the ED the patient was noted to have tachycardia. An EKG was performed which showed that the patient was in new onset Afib with RVR. She was given one dose of Cardizem 10 IV which controlled the rate.      (19 Nov 2021 08:42)      ALLERGIES:    No Known Allergies    PMHx:    DM (diabetes mellitus); Osteoarthritis; Hypothyroidism; Lupus; Hyperlipidemia; History of hysterectomy; History of cholecystectomy; History of lumpectomy      SOCIAL Hx:    From home, walker at [facility]    PHYSICAL EXAM:    Gen: NAD, resting in bed  HEENT: Normocephalic, atraumatic  Neck: supple, no lymphadenopathy  CV: Regular rate & regular rhythm  Lungs: decreased BS at bases, no fremitus  Abdomen: Soft, BS present  Ext: Warm, well perfused  Neuro: non focal, awake  Skin: no rash, no erythema   LENGTH OF HOSPITAL STAY: 10d    CHIEF COMPLAINT:    Patient is a 89y old  Female who presents with a chief complaint of Fall (28 Nov 2021 10:38)      OVERNIGHT EVENTS:    - no overnight events, pt still mild HTN, 168/71  - pt examined at bedside, no new complaints, denies CP, SOB, endorses urination and BM  - pt highly motivated to go home    FOLLOW UP:    - STR placement    HOSPITAL COURSE:    While on 3a, the patient received a ORIF for a Left intertrochanteric femur fracture and was given pain control with Oxycodone and Ketorolac. The patient was also found to have A. Fib with RVR and was started on Eliquis 5mg BID, however the patient had a acute hemoglobin drop for which she received 2 u pRBCs and her Eliquis was held, a repeat CT abd/pelvis showed no RP bleed. the patient was monitored off eliquis but was restarted as hemoglobin was stable. An Echo was done showing normal EF, mod. dilation of LA, and mild pulm HTN. Her chronic conditions were managed with her home medications.    HPI:    The patient is an 89 y/o female with a PMH of hypothyroidism, hyperlipidemia, osteoarthritis, lupus, and diabetes who presented to the ED after a fall. The patient was walking at home when she tripped and fell, landing on her left hip and side. She states that she was unable to get up after this happened. She denies losing consciousness. She is not any anticoagulation. The patient lives by herself  and ambulates with a walker at home at baseline. She reports that she is able to do most of her ADL's independently. She denies any other associated symptoms.    In the ED the patient's vitals were T98 /82 HR 64 SpO2 94% on RA. While in the ED the patient was noted to have tachycardia. An EKG was performed which showed that the patient was in new onset Afib with RVR. She was given one dose of Cardizem 10 IV which controlled the rate.      (19 Nov 2021 08:42)      ALLERGIES:    No Known Allergies    PMHx:    DM (diabetes mellitus); Osteoarthritis; Hypothyroidism; Lupus; Hyperlipidemia; History of hysterectomy; History of cholecystectomy; History of lumpectomy      SOCIAL Hx:    From home, walker at [place]    PHYSICAL EXAM:    Gen: NAD, resting in bed  HEENT: Normocephalic, atraumatic  Neck: supple, no lymphadenopathy  CV: Regular rate & regular rhythm  Lungs: decreased BS at bases, no fremitus  Abdomen: Soft, BS present  Ext: Warm, well perfused  Neuro: non focal, awake  Skin: no rash, no erythema   LENGTH OF HOSPITAL STAY: 10d    CHIEF COMPLAINT:    Patient is a 89y old  Female who presents with a chief complaint of Fall (28 Nov 2021 10:38)      OVERNIGHT EVENTS:    - no overnight events, pt still mild HTN, 168/71  - pt examined at bedside, no new complaints, denies CP, SOB, endorses urination and BM  - pt highly motivated to go home    FOLLOW UP:    - STR placement    HOSPITAL COURSE:    While on 3a, the patient received a ORIF for a Left intertrochanteric femur fracture and was given pain control with Oxycodone and Ketorolac. The patient was also found to have A. Fib with RVR and was started on Eliquis 5mg BID, however the patient had a acute hemoglobin drop for which she received 2 u pRBCs and her Eliquis was held, a repeat CT abd/pelvis showed no RP bleed. the patient was monitored off eliquis but was restarted as hemoglobin was stable. An Echo was done showing normal EF, mod. dilation of LA, and mild pulm HTN. Her chronic conditions were managed with her home medications.    HPI:    The patient is an 88 y/o female with a PMH of hypothyroidism, hyperlipidemia, osteoarthritis, lupus, and diabetes who presented to the ED after a fall. The patient was walking at home when she tripped and fell, landing on her left hip and side. She states that she was unable to get up after this happened. She denies losing consciousness. She is not any anticoagulation. The patient lives by herself  and ambulates with a walker at home at baseline. She reports that she is able to do most of her ADL's independently. She denies any other associated symptoms.    In the ED the patient's vitals were T98 /82 HR 64 SpO2 94% on RA. While in the ED the patient was noted to have tachycardia. An EKG was performed which showed that the patient was in new onset Afib with RVR. She was given one dose of Cardizem 10 IV which controlled the rate.      (19 Nov 2021 08:42)      ALLERGIES:    No Known Allergies    PMHx:    DM (diabetes mellitus); Osteoarthritis; Hypothyroidism; Lupus; Hyperlipidemia; History of hysterectomy; History of cholecystectomy; History of lumpectomy    SOCIAL Hx:    From home, walker at [facility]    MEDICATIONS:  STANDING MEDICATIONS  amLODIPine   Tablet 10 milliGRAM(s) Oral daily  apixaban 5 milliGRAM(s) Oral every 12 hours  atorvastatin 10 milliGRAM(s) Oral at bedtime  folic acid 1 milliGRAM(s) Oral daily  gabapentin 300 milliGRAM(s) Oral three times a day  levothyroxine 150 MICROGram(s) Oral daily  magnesium oxide 400 milliGRAM(s) Oral once  metoprolol tartrate 25 milliGRAM(s) Oral two times a day  nystatin Cream 1 Application(s) Topical two times a day  pantoprazole    Tablet 40 milliGRAM(s) Oral before breakfast    PRN MEDICATIONS  acetaminophen     Tablet .. 650 milliGRAM(s) Oral every 6 hours PRN  bisacodyl Suppository 10 milliGRAM(s) Rectal daily PRN  hydrALAZINE Injectable 10 milliGRAM(s) IV Push every 6 hours PRN  magnesium hydroxide Suspension 30 milliLiter(s) Oral daily PRN  ondansetron Injectable 4 milliGRAM(s) IV Push every 6 hours PRN      LABS:                        8.9    10.21 )-----------( 233      ( 29 Nov 2021 11:39 )             27.3     11-29    135  |  103  |  29<H>  ----------------------------<  115<H>  4.3   |  21  |  1.0    Ca    9.4      29 Nov 2021 11:39  Mg     1.6     11-29      VITALS:   T(F): 96.6  HR: 75  BP: 157/65  RR: 18  SpO2: 94%    PHYSICAL EXAM:    Gen: NAD, resting in bed  HEENT: Normocephalic, atraumatic  Neck: supple, no lymphadenopathy  CV: Regular rate & regular rhythm  Lungs: decreased BS at bases, no fremitus  Abdomen: Soft, BS present  Ext: Warm, well perfused  Neuro: awake  Skin: no rash, no erythema, multiple ecchymosis from blood draws

## 2021-11-29 NOTE — PROGRESS NOTE ADULT - SUBJECTIVE AND OBJECTIVE BOX
MELANIA TRISTAN  89y  Female      Patient is a 89y old  Female who presents with a chief complaint of Fall.      INTERVAL HPI/OVERNIGHT EVENTS:      ******************************* REVIEW OF SYSTEMS:**********************************************    All other review of systems negative    *********************** VITALS ******************************************    T(F): 96.6 (11-29-21 @ 12:30)  HR: 75 (11-29-21 @ 12:30) (67 - 79)  BP: 157/65 (11-29-21 @ 12:30) (157/65 - 168/71)  RR: 18 (11-29-21 @ 12:30) (17 - 18)  SpO2: 94% (11-29-21 @ 08:09) (94% - 98%)            ******************************** PHYSICAL EXAM:**************************************************  GENERAL: NAD    PSYCH: no agitation, baseline mentation  HEENT:     NERVOUS SYSTEM:  Alert & Oriented X2-3    PULMONARY: MATTEO, CTA    CARDIOVASCULAR: S1S2 RRR    GI: Soft, NT, ND; BS present.    EXTREMITIES:  2+ Peripheral Pulses, No clubbing. Left hip post op edema and dressing     LYMPH: No lymphadenopathy noted    SKIN: No rashes or lesions      **************************** LABS *******************************************************                          8.9    10.21 )-----------( 233      ( 29 Nov 2021 11:39 )             27.3     11-29    135  |  103  |  29<H>  ----------------------------<  115<H>  4.3   |  21  |  1.0    Ca    9.4      29 Nov 2021 11:39  Mg     1.6     11-29            Lactate Trend        CAPILLARY BLOOD GLUCOSE      POCT Blood Glucose.: 103 mg/dL (29 Nov 2021 11:41)          **************************Active Medications *******************************************  No Known Allergies      acetaminophen     Tablet .. 650 milliGRAM(s) Oral every 6 hours PRN  amLODIPine   Tablet 10 milliGRAM(s) Oral daily  apixaban 5 milliGRAM(s) Oral every 12 hours  atorvastatin 10 milliGRAM(s) Oral at bedtime  bisacodyl Suppository 10 milliGRAM(s) Rectal daily PRN  folic acid 1 milliGRAM(s) Oral daily  gabapentin 300 milliGRAM(s) Oral three times a day  hydrALAZINE Injectable 10 milliGRAM(s) IV Push every 6 hours PRN  levothyroxine 150 MICROGram(s) Oral daily  magnesium sulfate  IVPB 2 Gram(s) IV Intermittent once  metoprolol tartrate 25 milliGRAM(s) Oral two times a day  nystatin Cream 1 Application(s) Topical two times a day  ondansetron Injectable 4 milliGRAM(s) IV Push every 6 hours PRN  pantoprazole    Tablet 40 milliGRAM(s) Oral before breakfast      ***************************************************  RADIOLOGY & ADDITIONAL TESTS:    Imaging Personally Reviewed:  [ ] YES  [ ] NO    HEALTH ISSUES - PROBLEM Dx:

## 2021-11-29 NOTE — PROGRESS NOTE ADULT - ASSESSMENT
Patient is an 89 y/o Female presented s/p mechnical fall. found to have afib with RVR , acute  left intertrochanteric femur fx Course complicated by SAVANNAH    # Left intertrochanteric femur fracture 2/2 mechanical fall  - s/p ORIF by ortho, f/u OP  - Pain control as needed  - PT/OT/Physiatry     # SAVANNAH- resolving  - pt retained 700cc urine, likely from anethesia and immobility  -11/26- passed  TOV    - monitor renal function  -d/c IVF    # HTN  - Norvasc 10 mg po once daily, Metoprolol 25 mg po twice daily    # macrocytic Anemia   - h/h drop, no gross bleeding events, normal iron level  -11/24  s/p 1 unit PRBC BT , 11/27  hg- 8.6  - monitor CBC    # New onset paroxysmal Afib with RVR,   - CHADS VASC 3 (AGE/Sex)  - started eliquis  - c/w cardizem  - f/u cardio OP    # HLD  - Continue Atorvastatin     # Hypothyroidism  - Continue Synthroid    # Leukocytosis - resolved       DVT ppx: eliquis  GI ppx: Protonix  Activity: Activity as tolerated  Diet: DASH    Dispo: pending auth for str

## 2021-11-30 LAB
GLUCOSE BLDC GLUCOMTR-MCNC: 123 MG/DL — HIGH (ref 70–99)
GLUCOSE BLDC GLUCOMTR-MCNC: 151 MG/DL — HIGH (ref 70–99)
GLUCOSE BLDC GLUCOMTR-MCNC: 154 MG/DL — HIGH (ref 70–99)
GLUCOSE BLDC GLUCOMTR-MCNC: 181 MG/DL — HIGH (ref 70–99)

## 2021-11-30 PROCEDURE — 99232 SBSQ HOSP IP/OBS MODERATE 35: CPT

## 2021-11-30 RX ORDER — IBUPROFEN 200 MG
600 TABLET ORAL EVERY 6 HOURS
Refills: 0 | Status: DISCONTINUED | OUTPATIENT
Start: 2021-11-30 | End: 2021-12-02

## 2021-11-30 RX ADMIN — PANTOPRAZOLE SODIUM 40 MILLIGRAM(S): 20 TABLET, DELAYED RELEASE ORAL at 05:40

## 2021-11-30 RX ADMIN — Medication 150 MICROGRAM(S): at 05:37

## 2021-11-30 RX ADMIN — GABAPENTIN 300 MILLIGRAM(S): 400 CAPSULE ORAL at 13:13

## 2021-11-30 RX ADMIN — Medication 25 MILLIGRAM(S): at 17:18

## 2021-11-30 RX ADMIN — NYSTATIN CREAM 1 APPLICATION(S): 100000 CREAM TOPICAL at 17:19

## 2021-11-30 RX ADMIN — APIXABAN 5 MILLIGRAM(S): 2.5 TABLET, FILM COATED ORAL at 13:09

## 2021-11-30 RX ADMIN — ATORVASTATIN CALCIUM 10 MILLIGRAM(S): 80 TABLET, FILM COATED ORAL at 21:17

## 2021-11-30 RX ADMIN — AMLODIPINE BESYLATE 10 MILLIGRAM(S): 2.5 TABLET ORAL at 05:37

## 2021-11-30 RX ADMIN — GABAPENTIN 300 MILLIGRAM(S): 400 CAPSULE ORAL at 05:40

## 2021-11-30 RX ADMIN — Medication 25 MILLIGRAM(S): at 05:37

## 2021-11-30 RX ADMIN — APIXABAN 5 MILLIGRAM(S): 2.5 TABLET, FILM COATED ORAL at 21:18

## 2021-11-30 RX ADMIN — GABAPENTIN 300 MILLIGRAM(S): 400 CAPSULE ORAL at 21:17

## 2021-11-30 RX ADMIN — NYSTATIN CREAM 1 APPLICATION(S): 100000 CREAM TOPICAL at 05:40

## 2021-11-30 RX ADMIN — Medication 1 MILLIGRAM(S): at 13:09

## 2021-11-30 NOTE — PROGRESS NOTE ADULT - SUBJECTIVE AND OBJECTIVE BOX
Pt given discharge instructions. Questions answered and pt states understanding, no distress noted, pt ambulated out of unit.   MELANIA TRISTAN  89y  Female      Patient is a 89y old  Female who presents with a chief complaint of Fall.      INTERVAL HPI/OVERNIGHT EVENTS:      ******************************* REVIEW OF SYSTEMS:**********************************************    All other review of systems negative    *********************** VITALS ******************************************    T(F): 98.5 (11-30-21 @ 12:33)  HR: 71 (11-30-21 @ 12:33) (71 - 79)  BP: 115/53 (11-30-21 @ 12:33) (115/53 - 166/79)  RR: 18 (11-30-21 @ 12:33) (18 - 18)  SpO2: 96% (11-29-21 @ 19:37) (96% - 96%)            ******************************** PHYSICAL EXAM:**************************************************  GENERAL: NAD    PSYCH: no agitation, baseline mentation  HEENT:     NERVOUS SYSTEM:  Alert & Oriented X3,    PULMONARY: MATTEO, CTA    CARDIOVASCULAR: S1S2 RRR    GI: Soft, NT, ND; BS present.    EXTREMITIES:  2+ Peripheral Pulses, No clubbing, cyanosis, or edema    LYMPH: No lymphadenopathy noted    SKIN: No rashes or lesions      **************************** LABS *******************************************************                          8.9    10.21 )-----------( 233      ( 29 Nov 2021 11:39 )             27.3     11-29    135  |  103  |  29<H>  ----------------------------<  115<H>  4.3   |  21  |  1.0    Ca    9.4      29 Nov 2021 11:39  Mg     1.6     11-29            Lactate Trend        CAPILLARY BLOOD GLUCOSE      POCT Blood Glucose.: 154 mg/dL (30 Nov 2021 11:31)          **************************Active Medications *******************************************  No Known Allergies      acetaminophen     Tablet .. 650 milliGRAM(s) Oral every 6 hours PRN  amLODIPine   Tablet 10 milliGRAM(s) Oral daily  apixaban 5 milliGRAM(s) Oral every 12 hours  atorvastatin 10 milliGRAM(s) Oral at bedtime  bisacodyl Suppository 10 milliGRAM(s) Rectal daily PRN  folic acid 1 milliGRAM(s) Oral daily  gabapentin 300 milliGRAM(s) Oral three times a day  hydrALAZINE Injectable 10 milliGRAM(s) IV Push every 6 hours PRN  levothyroxine 150 MICROGram(s) Oral daily  metoprolol tartrate 25 milliGRAM(s) Oral two times a day  nystatin Cream 1 Application(s) Topical two times a day  ondansetron Injectable 4 milliGRAM(s) IV Push every 6 hours PRN  pantoprazole    Tablet 40 milliGRAM(s) Oral before breakfast      ***************************************************  RADIOLOGY & ADDITIONAL TESTS:    Imaging Personally Reviewed:  [ ] YES  [ ] NO    HEALTH ISSUES - PROBLEM Dx:

## 2021-11-30 NOTE — PROGRESS NOTE ADULT - ASSESSMENT
Patient is an 88 y/o Female presented s/p mechnical fall. found to have afib with RVR , acute  left intertrochanteric femur fx Course complicated by SAVANNAH    # Left intertrochanteric femur fracture 2/2 mechanical fall  - s/p ORIF by ortho, f/u OP  - Pain control as needed  - PT/OT/Physiatry     # SAVANNAH- resolving  - pt retained 700cc urine, likely from anethesia and immobility  -11/26- passed  TOV    - monitor renal function  -d/c IVF    # HTN  - Norvasc 10 mg po once daily, Metoprolol 25 mg po twice daily    # macrocytic Anemia   - h/h drop, no gross bleeding events, normal iron level  -11/24  s/p 1 unit PRBC BT , 11/27  hg- 8.6  - monitor CBC    # New onset paroxysmal Afib with RVR,   - CHADS VASC 3 (AGE/Sex)  - On Eliquis  - c/w cardizem  - f/u cardio OP    # HLD  - Continue Atorvastatin     # Hypothyroidism  - Continue Synthroid    # Leukocytosis - resolved       DVT ppx: eliquis  GI ppx: Protonix  Activity: Activity as tolerated  Diet: DASH    Dispo: pending auth for STR

## 2021-11-30 NOTE — PROGRESS NOTE ADULT - SUBJECTIVE AND OBJECTIVE BOX
`MELANIA TRISTAN 89y Female  MRN#: [id number]   Hospital Day: 11d    SUBJECTIVE  Patient is a 89y old Female who presents with a chief complaint of Fall (30 Nov 2021 13:17)  Currently admitted to medicine with the primary diagnosis of Intertrochanteric fracture      INTERVAL HPI AND OVERNIGHT EVENTS:  Patient was examined and seen at bedside. This morning she is resting comfortably in bed and reports no issues or overnight events.    REVIEW OF SYMPTOMS:  CONSTITUTIONAL: No weakness, fevers or chills; No headaches  EYES: No visual changes, eye pain, or discharge  ENT: No vertigo; No ear pain or change in hearing; No sore throat or difficulty swallowing  NECK: No pain or stiffness  RESPIRATORY: No cough, wheezing, or hemoptysis; No shortness of breath  CARDIOVASCULAR: No chest pain or palpitations  GASTROINTESTINAL: No abdominal or epigastric pain; No nausea, vomiting, or hematemesis; No diarrhea or constipation; No melena or hematochezia  GENITOURINARY: No dysuria, frequency or hematuria  MUSCULOSKELETAL: No joint pain, no muscle pain, no weakness  NEUROLOGICAL: No numbness or weakness  SKIN: No itching or rashes    OBJECTIVE  PAST MEDICAL & SURGICAL HISTORY  DM (diabetes mellitus)    Osteoarthritis    Hypothyroidism    Lupus    Hyperlipidemia    History of hysterectomy    History of cholecystectomy    History of lumpectomy      ALLERGIES:  No Known Allergies    MEDICATIONS:  STANDING MEDICATIONS  amLODIPine   Tablet 10 milliGRAM(s) Oral daily  apixaban 5 milliGRAM(s) Oral every 12 hours  atorvastatin 10 milliGRAM(s) Oral at bedtime  folic acid 1 milliGRAM(s) Oral daily  gabapentin 300 milliGRAM(s) Oral three times a day  levothyroxine 150 MICROGram(s) Oral daily  metoprolol tartrate 25 milliGRAM(s) Oral two times a day  nystatin Cream 1 Application(s) Topical two times a day  pantoprazole    Tablet 40 milliGRAM(s) Oral before breakfast    PRN MEDICATIONS  acetaminophen     Tablet .. 650 milliGRAM(s) Oral every 6 hours PRN  bisacodyl Suppository 10 milliGRAM(s) Rectal daily PRN  hydrALAZINE Injectable 10 milliGRAM(s) IV Push every 6 hours PRN  ondansetron Injectable 4 milliGRAM(s) IV Push every 6 hours PRN      PHYSICAL EXAM:  Constitutional: pt is comfortable in bed; no acute distress; well-groomed  HEENT: Full ROM in bilateral eyes; pupils symmetrical and equal in size; neck supple  Respiratory: (+) sounds in all lung fields; no crackles or wheezes appreciated  Cardiovascular: S1 S2 regular rate and rhythm; no murmurs or gallops appreciated  Gastrointestinal: (+) bowel sounds in all quadrants; abdomen is non-distended, non-tender to superficial and deep palpation  Extremities: extremities are non-edematous  Vascular: Warm and Well perfused UE and LE; no mottling or dusky skin   Neurological: AxO x3 to self, place and year  Skin: no rashes or ulcerations noted; no scaling present      VITAL SIGNS: Last 24 Hours  T(C): 36.9 (30 Nov 2021 12:33), Max: 36.9 (30 Nov 2021 12:33)  T(F): 98.5 (30 Nov 2021 12:33), Max: 98.5 (30 Nov 2021 12:33)  HR: 71 (30 Nov 2021 12:33) (71 - 79)  BP: 115/53 (30 Nov 2021 12:33) (115/53 - 166/79)  BP(mean): --  RR: 18 (30 Nov 2021 12:33) (18 - 18)  SpO2: 96% (29 Nov 2021 19:37) (96% - 96%)    LABS:                        8.9    10.21 )-----------( 233      ( 29 Nov 2021 11:39 )             27.3     11-29    135  |  103  |  29<H>  ----------------------------<  115<H>  4.3   |  21  |  1.0    Ca    9.4      29 Nov 2021 11:39  Mg     1.6     11-29

## 2021-11-30 NOTE — PROGRESS NOTE ADULT - ASSESSMENT
HOSPITAL COURSE:    Patient is an 89 y/o Female presented s/p mechnical fall. found to have afib with RVR , acute  left intertrochanteric femur fx Course complicated by SAVANNAH    ASSESSMENT & PLAN:    # Left intertrochanteric femur fracture 2/2 mechanical fall  - s/p ORIF by ortho, f/u OP  - Pain control as needed  - PT/OT/Physiatry   - pending discharge     # SAVANNAH- resolving  - pt retained 700cc urine, likely from anethesia and immobility  -11/26- passed  TOV    - monitor renal function  -d/c IVF  - pending discharge     # HTN  - Norvasc 10 mg po once daily, Metoprolol 25 mg po twice daily    # macrocytic Anemia   - h/h drop, no gross bleeding events, normal iron level  -11/24  s/p 1 unit PRBC BT , 11/27  hg- 8.6  - monitor CBC  - pending discharge     # New onset paroxysmal Afib with RVR,   - CHADS VASC 3 (AGE/Sex)  - started eliquis  - c/w cardizem  - f/u cardio OP  - pending discharge     # HLD  - Continue Atorvastatin     # Hypothyroidism  - Continue Synthroid    # Leukocytosis - resolved       DVT ppx: eliquis  GI ppx: Protonix  Activity: Activity as tolerated  Diet: DASH    Dispo: pending auth for str

## 2021-12-01 LAB
GLUCOSE BLDC GLUCOMTR-MCNC: 128 MG/DL — HIGH (ref 70–99)
GLUCOSE BLDC GLUCOMTR-MCNC: 150 MG/DL — HIGH (ref 70–99)
GLUCOSE BLDC GLUCOMTR-MCNC: 178 MG/DL — HIGH (ref 70–99)
GLUCOSE BLDC GLUCOMTR-MCNC: 96 MG/DL — SIGNIFICANT CHANGE UP (ref 70–99)

## 2021-12-01 PROCEDURE — 99231 SBSQ HOSP IP/OBS SF/LOW 25: CPT

## 2021-12-01 RX ADMIN — Medication 1 MILLIGRAM(S): at 13:34

## 2021-12-01 RX ADMIN — GABAPENTIN 300 MILLIGRAM(S): 400 CAPSULE ORAL at 17:35

## 2021-12-01 RX ADMIN — Medication 150 MICROGRAM(S): at 05:38

## 2021-12-01 RX ADMIN — APIXABAN 5 MILLIGRAM(S): 2.5 TABLET, FILM COATED ORAL at 22:05

## 2021-12-01 RX ADMIN — AMLODIPINE BESYLATE 10 MILLIGRAM(S): 2.5 TABLET ORAL at 05:38

## 2021-12-01 RX ADMIN — Medication 25 MILLIGRAM(S): at 17:36

## 2021-12-01 RX ADMIN — PANTOPRAZOLE SODIUM 40 MILLIGRAM(S): 20 TABLET, DELAYED RELEASE ORAL at 05:39

## 2021-12-01 RX ADMIN — ATORVASTATIN CALCIUM 10 MILLIGRAM(S): 80 TABLET, FILM COATED ORAL at 22:06

## 2021-12-01 RX ADMIN — GABAPENTIN 300 MILLIGRAM(S): 400 CAPSULE ORAL at 22:06

## 2021-12-01 RX ADMIN — GABAPENTIN 300 MILLIGRAM(S): 400 CAPSULE ORAL at 05:38

## 2021-12-01 RX ADMIN — NYSTATIN CREAM 1 APPLICATION(S): 100000 CREAM TOPICAL at 17:36

## 2021-12-01 RX ADMIN — Medication 25 MILLIGRAM(S): at 05:38

## 2021-12-01 RX ADMIN — NYSTATIN CREAM 1 APPLICATION(S): 100000 CREAM TOPICAL at 05:40

## 2021-12-01 RX ADMIN — APIXABAN 5 MILLIGRAM(S): 2.5 TABLET, FILM COATED ORAL at 10:15

## 2021-12-01 NOTE — PROGRESS NOTE ADULT - PROVIDER SPECIALTY LIST ADULT
Hospitalist
Internal Medicine
Orthopedics
Hospitalist
Internal Medicine
Orthopedics
Hospitalist
Internal Medicine
Internal Medicine
Orthopedics
Internal Medicine

## 2021-12-01 NOTE — PROGRESS NOTE ADULT - SUBJECTIVE AND OBJECTIVE BOX
MELANIA TRISTAN 89y Female  MRN#: [id number]   CODE STATUS:________    Hospital Day: 12d    Pt is currently admitted with the primary diagnosis of left Intertrochanteric femur fracture.    SUBJECTIVE  Hospital Course    The patient is an 89 y/o female with a PMH of hypothyroidism, hyperlipidemia, osteoarthritis, lupus, and diabetes who presented to the ED after a fall. The patient was walking at home when she tripped and fell, landing on her left hip and side. She states that she was unable to get up after this happened. She denies losing consciousness. She is not any anticoagulation. The patient lives by herself  and ambulates with a walker at home at baseline. She reports that she is able to do most of her ADL's independently. She denies any other associated symptoms.    The patient was seen and examined ,comfortable in bed ,pain controlled.  No new or overnight events.                                            ----------------------------------------------------------  OBJECTIVE  PAST MEDICAL & SURGICAL HISTORY  DM (diabetes mellitus)    Osteoarthritis    Hypothyroidism    Lupus    Hyperlipidemia    History of hysterectomy    History of cholecystectomy    History of lumpectomy                                              -----------------------------------------------------------  ALLERGIES:  No Known Allergies                                            ------------------------------------------------------------    HOME MEDICATIONS  Home Medications:  apixaban 5 mg oral tablet: 1 tab(s) orally 2 times a day (23 Nov 2021 10:45)  Aspir 81 oral delayed release tablet: 1 tab(s) orally once a day (26 Sep 2021 16:06)  atorvastatin 10 mg oral tablet: 1 tab(s) orally once a day (26 Sep 2021 16:13)  folic acid 1 mg oral tablet: 1 tab(s) orally once a day (26 Sep 2021 16:13)  gabapentin 300 mg oral capsule: 1 cap(s) orally 3 times a day (29 Jan 2020 16:36)  Metoprolol Tartrate 25 mg oral tablet: 1 tab(s) orally 2 times a day (23 Nov 2021 10:45)  oxyCODONE 5 mg oral tablet: 1 tab(s) orally every 4 hours, As needed, Mild Pain (1 - 3) (23 Nov 2021 10:45)  pantoprazole 40 mg oral delayed release tablet: 1 tab(s) orally once a day (before a meal) (23 Nov 2021 10:45)  Synthroid 150 mcg (0.15 mg) oral tablet: 1 tab(s) orally once a day (29 Jan 2020 16:36)                           MEDICATIONS:  STANDING MEDICATIONS  amLODIPine   Tablet 10 milliGRAM(s) Oral daily  apixaban 5 milliGRAM(s) Oral every 12 hours  atorvastatin 10 milliGRAM(s) Oral at bedtime  folic acid 1 milliGRAM(s) Oral daily  gabapentin 300 milliGRAM(s) Oral three times a day  levothyroxine 150 MICROGram(s) Oral daily  metoprolol tartrate 25 milliGRAM(s) Oral two times a day  nystatin Cream 1 Application(s) Topical two times a day  pantoprazole    Tablet 40 milliGRAM(s) Oral before breakfast    PRN MEDICATIONS  acetaminophen     Tablet .. 650 milliGRAM(s) Oral every 6 hours PRN  bisacodyl Suppository 10 milliGRAM(s) Rectal daily PRN  hydrALAZINE Injectable 10 milliGRAM(s) IV Push every 6 hours PRN  ibuprofen  Tablet. 600 milliGRAM(s) Oral every 6 hours PRN  ondansetron Injectable 4 milliGRAM(s) IV Push every 6 hours PRN                                            ------------------------------------------------------------  VITAL SIGNS: Last 24 Hours  T(C): 37.1 (01 Dec 2021 05:00), Max: 37.1 (01 Dec 2021 05:00)  T(F): 98.7 (01 Dec 2021 05:00), Max: 98.7 (01 Dec 2021 05:00)  HR: 70 (01 Dec 2021 05:00) (68 - 71)  BP: 154/74 (01 Dec 2021 05:00) (115/53 - 154/74)  BP(mean): --  RR: 18 (01 Dec 2021 05:00) (18 - 18)  SpO2: 96% (30 Nov 2021 19:30) (87% - 96%)                                             --------------------------------------------------------------  LABS:                        8.9    10.21 )-----------( 233      ( 29 Nov 2021 11:39 )             27.3     11-29    135  |  103  |  29<H>  ----------------------------<  115<H>  4.3   |  21  |  1.0    Ca    9.4      29 Nov 2021 11:39  Mg     1.6     11-29                                                -------------------------------------------------------------  RADIOLOGY:  < from: US Renal (11.23.21 @ 18:58) >  MPRESSION:    No hydronephrosis. Benign cyst within the left kidney.    < end of copied text >  < from: Xray Chest 1 View- PORTABLE-Urgent (Xray Chest 1 View- PORTABLE-Urgent .) (11.19.21 @ 08:41) >  IMPRESSION:    No focal consolidation, pneumothorax or pleural effusion.    Stable cardiomediastinal silhouette. Unchanged osseous structures.    < end of copied text >  < from: Xray Femur 2 Views, Left (11.19.21 @ 05:04) >  IMPRESSION:    Acute comminuted intertrochanteric fracture. Osteopenia.    < end of copied text >  < from: Xray Pelvis AP only (11.19.21 @ 04:19) >    IMPRESSION:  Acute comminuted left intertrochanteric fracture.    < end of copied text >                                            --------------------------------------------------------------    PHYSICAL EXAM:  GENERAL: NAD, lying in bed comfortably , on NC sat 96%  HEAD:  Atraumatic, Normocephalic  EYES: EOMI, PERRLA, conjunctiva and sclera clear  ENT: Moist mucous membranes  CHEST/LUNG: Clear to auscultation bilaterally  HEART: Regular rate and rhythm; No murmurs, rubs, or gallops  ABDOMEN:  Soft, Nontender, Nondistended.  EXTREMITIES:   No clubbing, cyanosis, LLE edema +2   NERVOUS SYSTEM:  Alert & Oriented X3, speech clear. No deficits   SKIN: No rashes or lesions                                           --------------------------------------------------------------    ASSESSMENT & PLAN    Past medical history and hospital course     Patient is an 89 y/o Female presented s/p mechnical fall. found to have afib with RVR , acute  left intertrochanteric femur fx Course complicated by SAVANNAH    # Left intertrochanteric femur fracture 2/2 mechanical fall  - s/p ORIF  by ortho 11/20 , f/u OP  - Pain control as needed  - PT/OT/Physiatry   - pending discharge     # SAVANNAH- resolved  - Cr 3.7 on admission >>1 baseline  - pt retained 700cc urine, likely from anethesia and immobility  -11/26- passed  TOV    - monitor renal function  -d/c IVF  - pending discharge     # HTN  - Norvasc 10 mg po once daily, Metoprolol 25 mg po twice daily    # macrocytic Anemia   - h/h drop, no gross bleeding events, normal iron level  -11/24  s/p 1 unit PRBC BT , 11/27  hg- 8.6>8.9   - monitor CBC  - pending discharge     # New onset paroxysmal Afib with RVR,   - CHADS VASC 3 (AGE/Sex)  - started eliquis  - c/w cardizem  - f/u cardio OP  - pending discharge     # HLD  - Continue Atorvastatin     # Hypothyroidism  - Continue Synthroid    # Leukocytosis - resolved       DVT ppx: eliquis  GI ppx: Protonix  Activity: Activity as tolerated  Diet: DASH    Dispo: pending auth for str

## 2021-12-02 VITALS
RESPIRATION RATE: 18 BRPM | DIASTOLIC BLOOD PRESSURE: 59 MMHG | SYSTOLIC BLOOD PRESSURE: 123 MMHG | TEMPERATURE: 97 F | HEART RATE: 66 BPM

## 2021-12-02 LAB
GLUCOSE BLDC GLUCOMTR-MCNC: 115 MG/DL — HIGH (ref 70–99)
GLUCOSE BLDC GLUCOMTR-MCNC: 122 MG/DL — HIGH (ref 70–99)
GLUCOSE BLDC GLUCOMTR-MCNC: 166 MG/DL — HIGH (ref 70–99)

## 2021-12-02 PROCEDURE — 99233 SBSQ HOSP IP/OBS HIGH 50: CPT

## 2021-12-02 RX ORDER — ATORVASTATIN CALCIUM 80 MG/1
1 TABLET, FILM COATED ORAL
Qty: 0 | Refills: 0 | DISCHARGE

## 2021-12-02 RX ORDER — AMLODIPINE BESYLATE 2.5 MG/1
1 TABLET ORAL
Qty: 30 | Refills: 0
Start: 2021-12-02 | End: 2021-12-31

## 2021-12-02 RX ADMIN — Medication 25 MILLIGRAM(S): at 06:02

## 2021-12-02 RX ADMIN — PANTOPRAZOLE SODIUM 40 MILLIGRAM(S): 20 TABLET, DELAYED RELEASE ORAL at 06:02

## 2021-12-02 RX ADMIN — Medication 150 MICROGRAM(S): at 06:02

## 2021-12-02 RX ADMIN — GABAPENTIN 300 MILLIGRAM(S): 400 CAPSULE ORAL at 13:12

## 2021-12-02 RX ADMIN — Medication 1 MILLIGRAM(S): at 11:51

## 2021-12-02 RX ADMIN — APIXABAN 5 MILLIGRAM(S): 2.5 TABLET, FILM COATED ORAL at 11:51

## 2021-12-02 RX ADMIN — AMLODIPINE BESYLATE 10 MILLIGRAM(S): 2.5 TABLET ORAL at 06:01

## 2021-12-02 RX ADMIN — GABAPENTIN 300 MILLIGRAM(S): 400 CAPSULE ORAL at 06:02

## 2021-12-02 RX ADMIN — NYSTATIN CREAM 1 APPLICATION(S): 100000 CREAM TOPICAL at 06:03

## 2021-12-02 NOTE — DISCHARGE NOTE NURSING/CASE MANAGEMENT/SOCIAL WORK - NSDCPEFALRISK_GEN_ALL_CORE
For information on Fall & Injury Prevention, visit: [email or web address] OR  [email or web address] OR  https://www.cdc.gov/steadi/patient.html

## 2021-12-02 NOTE — DISCHARGE NOTE NURSING/CASE MANAGEMENT/SOCIAL WORK - NSDCVIVACCINE_GEN_ALL_CORE_FT
influenza, injectable, quadrivalent, preservative free; 30-Sep-2021 15:44; Carol Dewitt (ABRAHAM); [employer]; H32SG (Exp. Date: 30-Jun-2022); IntraMuscular; Deltoid Left.; 0.5 milliLiter(s); VIS (VIS Published: 15-Aug-2019, VIS Presented: 30-Sep-2021);

## 2021-12-02 NOTE — DISCHARGE NOTE NURSING/CASE MANAGEMENT/SOCIAL WORK - PATIENT PORTAL LINK FT
You can access the FollowMyHealth Patient Portal offered by [facility] by registering at the following website: [email or web address]. By joining [facility]’s FollowMyHealth portal, you will also be able to view your health information using other applications (apps) compatible with our system.

## 2021-12-02 NOTE — CHART NOTE - NSCHARTNOTEFT_GEN_A_CORE
Patient getting lethargic post morphine, will d/c morphine  Arousable to voice, good eye contact. DTR 2+ bilaterally, Motor power 4/5, negative babinski, symmetric face, no slurred speech.
Anesthesia Post Op Assessment  		(    ) Intubated           TV _____	Rate __sv___	FiO2___4lnc__  		( x   ) Patent airway. Full return of protective reflexes  		( x  )Full recovery from anesthesia/sedation to baseline status      Cardiovascular Function:  		BP:	130/64	                  Pulse:		93                  RR:		12                  Temp:		97.6                  O2Sat:    100      Mental Status:  	        ( x   ) awake		  ( x   ) alert		 (    ) drowsy	               (    ) sedated      Nausea/Vomiting:  		(    ) Yes, See post-op orders		   (  x  ) No      Pain Scale: (0-10):			Treatment:     (    ) None	            ( x   ) See Post-Op/PCA Orders      Post-operative Fluids: 	   (    ) Oral	          (  x  ) See post-op Orders        Comments:    Uneventful. No complications from anesthesia.  Discharge when criteria met.
Patient seen and examined independently of resident. My addendum supersedes resident notation. Case discussed with housestaff, nursing and patient    feels well, still has LLE pain and trouble moving the leg. works with PT. spending some time out of bed in the chair. appetite is good    vss  deconditioned  L lateral thigh post op slight bruising/ no strike through bleeding, active ROM  limited in flexion   irregularly irregular no m/r/g  comfortable on RA cta b/l  euvolemic appearing     88 y/o Female presented s/p mechanical fall. found to have afib with RVR , acute  left intertrochanteric femur fx Course complicated by SAVANNAH    # Left intertrochanteric femur fracture 2/2 mechanical fall  - s/p ORIF by ortho, f/u OP  - Pain control as needed  - PT/OT/Physiatry     # SAVANNAH- resolving  - pt retained 700cc urine, likely from aesthesia and immobility  -11/26- passed  TOV    - monitor renal function  drinking well, urinating freely today    # HTN  - Norvasc 10 mg po once daily, Metoprolol 25 mg po twice daily    # macrocytic Anemia   - h/h drop, no gross bleeding events, normal iron level  -11/24  s/p 1 unit PRBC BT , 11/27  hg- 8.6  - monitor CBC    # New onset paroxysmal Afib with RVR,   - CHADS VASC 3 (AGE/Sex)  - On Eliquis  - c/w cardizem  - f/u cardio OP    # HLD  - Continue Atorvastatin     # Hypothyroidism  - Continue Synthroid    # Leukocytosis - resolved       DVT ppx: eliquis  GI ppx: Protonix  Activity: Activity as tolerated  Diet: DASH    medically stable for SNF discharge    Patient seen independently of resident.  >30 minutes spent coordinating discharge planning, medicine reconciliation, follow up plan, and direct patient encounter    see discharge summary for further recommendation

## 2022-08-03 NOTE — ED ADULT TRIAGE NOTE - SPO2 (%)
96 impairments found/functional limitations in following categories/risk reduction/prevention/rehab potential/therapy frequency/predicted duration of therapy intervention/anticipated equipment needs at discharge/anticipated discharge recommendation

## 2024-03-26 NOTE — ED ADULT NURSE NOTE - CHPI ED NUR SYMPTOMS POS
Patient/Caregiver provided printed discharge information. pt states she was sent to ED by [facility] for redness to left lower leg x "a couple of days" as per pt

## 2024-10-02 NOTE — PATIENT PROFILE ADULT - LEGAL HELP
Pt arrives to ED from home via EMS with a chief complaint of medical screening exam. Per report EMS was called by home health nurse that was concerned about irregular heart rate and hypotension. Upon EMS arrival pt was not found to be hypotensive but was found to have irregular heart rate but has hx of a-fib. Home health nurse was also concerned about increased weakness. Pt is AOx4 in triage and has no medical complaints in particular.    no